# Patient Record
Sex: FEMALE | Race: WHITE
[De-identification: names, ages, dates, MRNs, and addresses within clinical notes are randomized per-mention and may not be internally consistent; named-entity substitution may affect disease eponyms.]

---

## 2017-02-23 ENCOUNTER — HOSPITAL ENCOUNTER (INPATIENT)
Dept: HOSPITAL 23 - P1E | Age: 52
LOS: 13 days | Discharge: HOME | DRG: 897 | End: 2017-03-08
Admitting: SPECIALIST
Payer: COMMERCIAL

## 2017-02-23 DIAGNOSIS — K92.1: ICD-10-CM

## 2017-02-23 DIAGNOSIS — K21.9: ICD-10-CM

## 2017-02-23 DIAGNOSIS — F10.239: Primary | ICD-10-CM

## 2017-02-23 DIAGNOSIS — F17.200: ICD-10-CM

## 2017-02-23 DIAGNOSIS — G40.909: ICD-10-CM

## 2017-02-23 DIAGNOSIS — F41.9: ICD-10-CM

## 2017-02-23 DIAGNOSIS — F31.32: ICD-10-CM

## 2017-02-23 DIAGNOSIS — E66.9: ICD-10-CM

## 2017-02-23 PROCEDURE — HZ2ZZZZ DETOXIFICATION SERVICES FOR SUBSTANCE ABUSE TREATMENT: ICD-10-PCS | Performed by: SPECIALIST

## 2017-02-24 LAB
ANISOCYTOSIS: (no result)
BARBITURATES UR QL SCN: 0.3 MG/DL (ref 0.2–2)
BARBITURATES UR QL SCN: 3.5 G/DL (ref 3.5–5)
BARBITURATES: (no result)
BASOPHIL#: 0 X10E3 (ref 0–0.3)
BASOPHIL%: 0.6 % (ref 0–2.5)
BASOPHIL: 1 % (ref 0–3)
BENZODIAZ UR QL SCN: 12 U/L (ref 10–40)
BENZODIAZ UR QL SCN: 12 U/L (ref 10–42)
BENZODIAZEPINES: (no result)
BLOOD UREA NITROGEN: 15 MG/DL (ref 9–23)
BUN/CREATININE RATIO: 18.75
BZE UR QL SCN: 44 U/L (ref 32–92)
CALCIUM SERUM: 8.9 MG/DL (ref 8.4–10.2)
CK MB SERPL-RTO: 15.5 % (ref 11–15.5)
CK MB SERPL-RTO: 33.5 G/DL (ref 30–36)
COCAINE: (no result)
CREATININE SERUM: 0.8 MG/DL (ref 0.6–1.4)
DIFF IND: YES
DX ICD CODE: (no result)
DX ICD CODE: (no result)
EOSINOPHIL#: 0.2 X10E3 (ref 0–0.7)
EOSINOPHIL%: 3.7 % (ref 0–7)
EOSINOPHIL: 1 % (ref 0–7)
FREE THYROXIN (T4): 0.68 NG/DL (ref 0.58–1.64)
GLOM FILT RATE ESTIMATED: (no result) ML/MIN (ref 60–?)
GLUCOSE FASTING: 82 MG/DL (ref 70–110)
HEMATOCRIT: 40.7 % (ref 35–45)
HEMOGLOBIN: 13.6 GM/DL (ref 12–16)
KETONES UR QL: 112 MMOL/L (ref 100–111)
KETONES UR QL: 24 MMOL/L (ref 22–31)
LYMPHOCYTE#: 2.4 X10E3 (ref 1–3.5)
LYMPHOCYTE%: 51.8 % (ref 17–45)
LYMPHOCYTE: 51 % (ref 17–50)
MACROCYTOSIS: (no result)
MEAN CELL VOLUME: 99 FL (ref 83–96)
MEAN CORPUSCULAR HEMOGLOBIN: 33.1 PG (ref 28–34)
MEAN PLATELET VOLUME: 9.2 FL (ref 6.5–11.5)
MONOCYTE#: 0.5 X10E3 (ref 0–1)
MONOCYTE%: 11 % (ref 3–12)
MONOCYTE: 5 % (ref 3–12)
NEUTROPHIL#: 1.5 X10E3 (ref 1.5–7.1)
NEUTROPHIL%: 32.9 % (ref 40–75)
NEUTROPHIL: 42 % (ref 40–75)
OPIATES: (no result)
PLATELET COUNT: 169 X10E3 (ref 140–420)
PLATELET ESTIMATE: NORMAL
POTASSIUM: 4.2 MMOL/L (ref 3.5–5.1)
PROTEIN TOTAL SERUM: 6 G/DL (ref 6–8.3)
RED BLOOD COUNT: 4.11 X10E (ref 3.9–5.3)
SODIUM: 143 MMOL/L (ref 135–145)
THYROID STIMULATING HORMONE: 0.81 UIU/ML (ref 0.34–5.6)
TRICYCLIC ANTIDEPRESSANTS: (no result)
U METHADONE: (no result)
URINE APPEARANCE: (no result)
URINE BILIRUBIN: (no result)
URINE BLOOD: (no result)
URINE COLOR: (no result)
URINE GLUCOSE: (no result) MG/DL
URINE KETONE: (no result)
URINE LEUKOCYTE ESTERASE: (no result)
URINE NITRATE: (no result)
URINE PH: 7 (ref 5–8)
URINE PROTEIN: (no result)
URINE SOURCE: (no result)
URINE SPECIFIC GRAVITY: 1.03 (ref 1–1.03)
URINE UROBILINOGEN: 1 MG/DL
WHITE BLOOD COUNT: 4.7 X10E3 (ref 4–10.5)

## 2017-03-04 LAB
HEMATOCRIT: 41.7 % (ref 35–45)
HEMOGLOBIN: 13.8 GM/DL (ref 12–16)

## 2017-03-08 ENCOUNTER — HOSPITAL ENCOUNTER (INPATIENT)
Dept: HOSPITAL 23 - P2S | Age: 52
LOS: 1 days | Discharge: HOME | DRG: 885 | End: 2017-03-09
Admitting: SPECIALIST
Payer: COMMERCIAL
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
## 2017-03-08 DIAGNOSIS — F31.9: Primary | ICD-10-CM

## 2017-03-08 DIAGNOSIS — G40.909: ICD-10-CM

## 2017-03-08 DIAGNOSIS — F10.20: ICD-10-CM

## 2017-06-09 ENCOUNTER — HOSPITAL ENCOUNTER (INPATIENT)
Dept: HOSPITAL 23 - P2L | Age: 52
LOS: 5 days | Discharge: HOME | DRG: 885 | End: 2017-06-14
Attending: SPECIALIST | Admitting: SPECIALIST
Payer: COMMERCIAL

## 2017-06-09 DIAGNOSIS — F17.210: ICD-10-CM

## 2017-06-09 DIAGNOSIS — R45.851: ICD-10-CM

## 2017-06-09 DIAGNOSIS — M19.90: ICD-10-CM

## 2017-06-09 DIAGNOSIS — E66.9: ICD-10-CM

## 2017-06-09 DIAGNOSIS — G40.909: ICD-10-CM

## 2017-06-09 DIAGNOSIS — Z91.040: ICD-10-CM

## 2017-06-09 DIAGNOSIS — F31.32: Primary | ICD-10-CM

## 2017-06-09 DIAGNOSIS — Z88.5: ICD-10-CM

## 2017-06-09 DIAGNOSIS — K21.9: ICD-10-CM

## 2017-06-10 LAB
BARBITURATES UR QL SCN: 0.8 MG/DL (ref 0.2–2)
BARBITURATES UR QL SCN: 4.3 G/DL (ref 3.5–5)
BASOPHIL#: 0.1 X10E3 (ref 0–0.3)
BASOPHIL%: 0.8 % (ref 0–2.5)
BENZODIAZ UR QL SCN: 10 U/L (ref 10–40)
BENZODIAZ UR QL SCN: 13 U/L (ref 10–42)
BLOOD UREA NITROGEN: 16 MG/DL (ref 9–23)
BUN/CREATININE RATIO: 17.77
BZE UR QL SCN: 48 U/L (ref 32–92)
CALCIUM SERUM: 9.6 MG/DL (ref 8.4–10.2)
CK MB SERPL-RTO: 14.3 % (ref 11–15.5)
CK MB SERPL-RTO: 32.9 G/DL (ref 30–36)
CREATININE SERUM: 0.9 MG/DL (ref 0.6–1.4)
DEPAKENE (VALPROIC ACID): <10 UG/ML (ref 50–125)
DIFF IND: NO
EOSINOPHIL#: 0.4 X10E3 (ref 0–0.7)
EOSINOPHIL%: 4.8 % (ref 0–7)
GLOM FILT RATE ESTIMATED: 74.1 ML/MIN (ref 60–?)
GLUCOSE FASTING: 93 MG/DL (ref 70–110)
HEMATOCRIT: 44.4 % (ref 35–45)
HEMOGLOBIN: 14.6 GM/DL (ref 12–16)
KETONES UR QL: 110 MMOL/L (ref 100–111)
KETONES UR QL: 27 MMOL/L (ref 22–31)
LYMPHOCYTE#: 3.4 X10E3 (ref 1–3.5)
LYMPHOCYTE%: 45.2 % (ref 17–45)
MEAN CELL VOLUME: 101.4 FL (ref 83–96)
MEAN CORPUSCULAR HEMOGLOBIN: 33.4 PG (ref 28–34)
MEAN PLATELET VOLUME: 9.9 FL (ref 6.5–11.5)
MONOCYTE#: 0.6 X10E3 (ref 0–1)
MONOCYTE%: 8.3 % (ref 3–12)
NEUTROPHIL#: 3 X10E3 (ref 1.5–7.1)
NEUTROPHIL%: 40.9 % (ref 40–75)
PLATELET COUNT: 197 X10E3 (ref 140–420)
POTASSIUM: 3.8 MMOL/L (ref 3.5–5.1)
PROTEIN TOTAL SERUM: 7.1 G/DL (ref 6–8.3)
RED BLOOD COUNT: 4.37 X10E (ref 3.9–5.3)
SODIUM: 142 MMOL/L (ref 135–145)
WHITE BLOOD COUNT: 7.5 X10E3 (ref 4–10.5)

## 2017-06-12 LAB
BARBITURATES: (no result)
BENZODIAZEPINES: (no result)
COCAINE: (no result)
DX ICD CODE: (no result)
DX ICD CODE: (no result)
OPIATES: (no result)
TRICYCLIC ANTIDEPRESSANTS: (no result)
U HYALINE CASTS AUWI: (no result) /[LPF]
U METHADONE: (no result)
URBCS1 AUWI: (no result) /[HPF] (ref 0–2)
URINE APPEARANCE: CLEAR
URINE BACTERIA AUWI: (no result)
URINE BILIRUBIN: (no result)
URINE BLOOD: (no result)
URINE COLOR: YELLOW
URINE GLUCOSE: (no result) MG/DL
URINE KETONE: (no result)
URINE LEUKOCYTE ESTERASE: (no result)
URINE NITRATE: (no result)
URINE PH: 6.5 (ref 5–8)
URINE PROTEIN: (no result)
URINE SOURCE: (no result)
URINE SPECIFIC GRAVITY: 1.02 (ref 1–1.03)
URINE SQUAMOUS EPITHELIAL CELL: (no result) /[HPF]
URINE UROBILINOGEN: 0.2 MG/DL
UWBCS1 AUWI: (no result) (ref 0–5)

## 2017-07-05 ENCOUNTER — HOSPITAL ENCOUNTER (INPATIENT)
Dept: HOSPITAL 23 - P1E | Age: 52
LOS: 5 days | DRG: 885 | End: 2017-07-10
Attending: SPECIALIST | Admitting: SPECIALIST
Payer: COMMERCIAL

## 2017-07-05 ENCOUNTER — HOSPITAL ENCOUNTER (EMERGENCY)
Dept: HOSPITAL 23 - CED | Age: 52
End: 2017-07-05
Payer: COMMERCIAL

## 2017-07-05 DIAGNOSIS — Z91.040: ICD-10-CM

## 2017-07-05 DIAGNOSIS — F11.23: ICD-10-CM

## 2017-07-05 DIAGNOSIS — R45.851: Primary | ICD-10-CM

## 2017-07-05 DIAGNOSIS — Z88.5: ICD-10-CM

## 2017-07-05 DIAGNOSIS — E66.9: ICD-10-CM

## 2017-07-05 DIAGNOSIS — F31.9: ICD-10-CM

## 2017-07-05 DIAGNOSIS — E11.9: ICD-10-CM

## 2017-07-05 DIAGNOSIS — Z79.899: ICD-10-CM

## 2017-07-05 DIAGNOSIS — Z98.51: ICD-10-CM

## 2017-07-05 DIAGNOSIS — G40.909: ICD-10-CM

## 2017-07-05 DIAGNOSIS — K21.9: ICD-10-CM

## 2017-07-05 DIAGNOSIS — F17.210: ICD-10-CM

## 2017-07-05 DIAGNOSIS — F33.1: Primary | ICD-10-CM

## 2017-07-05 LAB
ACETAMINOPHEN: <10 UG/ML
ALCOHOL BLOOD: 18 MG/DL
BARBITURATES UR QL SCN: 0.5 MG/DL
BARBITURATES UR QL SCN: 4.3 G/DL
BARBITURATES: (no result)
BASOPHIL#: 0.1 X10E3
BASOPHIL%: 1 %
BENZODIAZ UR QL SCN: 12 U/L
BENZODIAZ UR QL SCN: 15 U/L
BENZODIAZEPINES: (no result)
BLOOD UREA NITROGEN: 10 MG/DL
BUN/CREATININE RATIO: 12.5
BZE UR QL SCN: 52 U/L
CALCIUM SERUM: 9.1 MG/DL
CK MB SERPL-RTO: 13.6 %
CK MB SERPL-RTO: 34.1 G/DL
COCAINE: (no result)
CREATININE SERUM: 0.8 MG/DL
DIFF IND: NO
DX ICD CODE: (no result)
DX ICD CODE: (no result)
EOSINOPHIL#: 0.2 X10E3
EOSINOPHIL%: 2.3 %
ETHANOL BLD GC-MCNC: <4 MG/DL
GLOM FILT RATE ESTIMATED: 85.4 ML/MIN
GLUCOSE FASTING: 81 MG/DL
HEMATOCRIT: 42.1 %
HEMOGLOBIN: 14.4 GM/DL
KETONES UR QL: 109 MMOL/L
KETONES UR QL: 22 MMOL/L
LYMPHOCYTE#: 2.8 X10E3
LYMPHOCYTE%: 44.1 %
MEAN CELL VOLUME: 98.4 FL
MEAN CORPUSCULAR HEMOGLOBIN: 33.6 PG
MEAN PLATELET VOLUME: 8.8 FL
METHADONE UR QL SCN: 1.2 NG/ML
MONOCYTE#: 0.4 X10E3
MONOCYTE%: 6.9 %
NEUTROPHIL#: 2.9 X10E3
NEUTROPHIL%: 45.7 %
OPIATES: (no result)
PLATELET COUNT: 228 X10E3
POC - TROPONIN: <0.05 NG/ML
POTASSIUM: 3.6 MMOL/L
PROTEIN TOTAL SERUM: 7.4 G/DL
RED BLOOD COUNT: 4.28 X10E
SODIUM: 141 MMOL/L
TRICYCLIC ANTIDEPRESSANTS: (no result)
U METHADONE: (no result)
WHITE BLOOD COUNT: 6.4 X10E3

## 2017-07-05 PROCEDURE — HZ2ZZZZ DETOXIFICATION SERVICES FOR SUBSTANCE ABUSE TREATMENT: ICD-10-PCS | Performed by: SPECIALIST

## 2017-07-05 PROCEDURE — G0480 DRUG TEST DEF 1-7 CLASSES: HCPCS

## 2017-07-06 LAB
BARBITURATES UR QL SCN: 0.4 MG/DL (ref 0.2–2)
BARBITURATES UR QL SCN: 3.6 G/DL (ref 3.5–5)
BASOPHIL#: 0 X10E3 (ref 0–0.3)
BASOPHIL%: 0.6 % (ref 0–2.5)
BENZODIAZ UR QL SCN: 13 U/L (ref 10–40)
BENZODIAZ UR QL SCN: 13 U/L (ref 10–42)
BLOOD UREA NITROGEN: 16 MG/DL (ref 9–23)
BUN/CREATININE RATIO: 22.85
BZE UR QL SCN: 45 U/L (ref 32–92)
CALCIUM SERUM: 9.3 MG/DL (ref 8.4–10.2)
CK MB SERPL-RTO: 13.9 % (ref 11–15.5)
CK MB SERPL-RTO: 33.3 G/DL (ref 30–36)
CREATININE SERUM: 0.7 MG/DL (ref 0.6–1.4)
DIFF IND: NO
EOSINOPHIL#: 0.2 X10E3 (ref 0–0.7)
EOSINOPHIL%: 3.4 % (ref 0–7)
GLOM FILT RATE ESTIMATED: 100.3 ML/MIN (ref 60–?)
GLUCOSE FASTING: 90 MG/DL (ref 70–110)
HEMATOCRIT: 41 % (ref 35–45)
HEMOGLOBIN: 13.6 GM/DL (ref 12–16)
KETONES UR QL: 109 MMOL/L (ref 100–111)
KETONES UR QL: 25 MMOL/L (ref 22–31)
LYMPHOCYTE#: 2.8 X10E3 (ref 1–3.5)
LYMPHOCYTE%: 44.7 % (ref 17–45)
MEAN CELL VOLUME: 99.8 FL (ref 83–96)
MEAN CORPUSCULAR HEMOGLOBIN: 33.3 PG (ref 28–34)
MEAN PLATELET VOLUME: 9.6 FL (ref 6.5–11.5)
MONOCYTE#: 0.6 X10E3 (ref 0–1)
MONOCYTE%: 9.4 % (ref 3–12)
NEUTROPHIL#: 2.7 X10E3 (ref 1.5–7.1)
NEUTROPHIL%: 41.9 % (ref 40–75)
PLATELET COUNT: 184 X10E3 (ref 140–420)
POTASSIUM: 3.8 MMOL/L (ref 3.5–5.1)
PROTEIN TOTAL SERUM: 6 G/DL (ref 6–8.3)
RED BLOOD COUNT: 4.1 X10E (ref 3.9–5.3)
SODIUM: 140 MMOL/L (ref 135–145)
WHITE BLOOD COUNT: 6.4 X10E3 (ref 4–10.5)

## 2017-07-12 ENCOUNTER — HOSPITAL ENCOUNTER (EMERGENCY)
Dept: HOSPITAL 23 - CED | Age: 52
Discharge: HOME | End: 2017-07-12
Payer: COMMERCIAL

## 2017-07-12 DIAGNOSIS — Z88.8: ICD-10-CM

## 2017-07-12 DIAGNOSIS — F17.200: ICD-10-CM

## 2017-07-12 DIAGNOSIS — Z98.890: ICD-10-CM

## 2017-07-12 DIAGNOSIS — Z91.040: ICD-10-CM

## 2017-07-12 DIAGNOSIS — F10.14: Primary | ICD-10-CM

## 2017-07-12 LAB
BARBITURATES UR QL SCN: 0.6 MG/DL (ref 0.2–2)
BARBITURATES UR QL SCN: 4.4 G/DL (ref 3.5–5)
BASOPHIL#: 0 X10E3 (ref 0–0.3)
BASOPHIL%: 0.2 % (ref 0–2.5)
BENZODIAZ UR QL SCN: 23 U/L (ref 10–42)
BENZODIAZ UR QL SCN: 28 U/L (ref 10–40)
BLOOD UREA NITROGEN: 12 MG/DL (ref 9–23)
BUN/CREATININE RATIO: 15
BZE UR QL SCN: 68 U/L (ref 32–92)
CALCIUM SERUM: 9.2 MG/DL (ref 8.4–10.2)
CK MB SERPL-RTO: 13.4 % (ref 11–15.5)
CK MB SERPL-RTO: 33.3 G/DL (ref 30–36)
CREATININE SERUM: 0.8 MG/DL (ref 0.6–1.4)
DIFF IND: NO
EOSINOPHIL#: 0.1 X10E3 (ref 0–0.7)
EOSINOPHIL%: 1.1 % (ref 0–7)
GENTAMICIN PEAK SERPL-MCNC: NO MG/L
GLOM FILT RATE ESTIMATED: 85.4 ML/MIN (ref 60–?)
GLUCOSE FASTING: 119 MG/DL (ref 70–110)
HEMATOCRIT: 44.9 % (ref 35–45)
HEMOGLOBIN: 14.9 GM/DL (ref 12–16)
KETONES UR QL: 107 MMOL/L (ref 100–111)
KETONES UR QL: 23 MMOL/L (ref 22–31)
LIPASE: 15 U/L (ref 22–51)
LYMPHOCYTE#: 1.6 X10E3 (ref 1–3.5)
LYMPHOCYTE%: 14.6 % (ref 17–45)
MEAN CELL VOLUME: 99.5 FL (ref 83–96)
MEAN CORPUSCULAR HEMOGLOBIN: 33.1 PG (ref 28–34)
MEAN PLATELET VOLUME: 9.2 FL (ref 6.5–11.5)
MONOCYTE#: 0.5 X10E3 (ref 0–1)
MONOCYTE%: 4.4 % (ref 3–12)
NEUTROPHIL#: 8.9 X10E3 (ref 1.5–7.1)
NEUTROPHIL%: 79.7 % (ref 40–75)
PLATELET COUNT: 225 X10E3 (ref 140–420)
POTASSIUM: 3.4 MMOL/L (ref 3.5–5.1)
PROTEIN TOTAL SERUM: 7.6 G/DL (ref 6–8.3)
RED BLOOD COUNT: 4.51 X10E (ref 3.9–5.3)
SODIUM: 138 MMOL/L (ref 135–145)
U HYALINE CASTS AUWI: (no result) /[LPF]
URBCS1 AUWI: (no result) /[HPF] (ref 0–2)
URINE APPEARANCE: (no result)
URINE BACTERIA AUWI: (no result)
URINE BILIRUBIN: (no result)
URINE BLOOD: (no result)
URINE COLOR: YELLOW
URINE GLUCOSE: (no result) MG/DL
URINE KETONE: (no result)
URINE LEUKOCYTE ESTERASE: (no result)
URINE NITRATE: (no result)
URINE PH: 7 (ref 5–8)
URINE PROTEIN: (no result)
URINE SOURCE: (no result)
URINE SPECIFIC GRAVITY: 1.01 (ref 1–1.03)
URINE SQUAMOUS EPITHELIAL CELL: (no result) /[HPF]
URINE UROBILINOGEN: 0.2 MG/DL
UWBCS1 AUWI: (no result) (ref 0–5)
WHITE BLOOD COUNT: 11.2 X10E3 (ref 4–10.5)

## 2018-05-23 ENCOUNTER — APPOINTMENT (OUTPATIENT)
Dept: CT IMAGING | Facility: HOSPITAL | Age: 53
End: 2018-05-23

## 2018-05-23 ENCOUNTER — HOSPITAL ENCOUNTER (EMERGENCY)
Facility: HOSPITAL | Age: 53
Discharge: HOME | End: 2018-05-23
Attending: EMERGENCY MEDICINE | Admitting: EMERGENCY MEDICINE

## 2018-05-23 VITALS
RESPIRATION RATE: 16 BRPM | OXYGEN SATURATION: 99 % | HEART RATE: 74 BPM | SYSTOLIC BLOOD PRESSURE: 117 MMHG | DIASTOLIC BLOOD PRESSURE: 85 MMHG | TEMPERATURE: 97.9 F | WEIGHT: 190 LBS | HEIGHT: 68 IN | BODY MASS INDEX: 28.79 KG/M2

## 2018-05-23 DIAGNOSIS — G40.909 SEIZURE DISORDER (HCC): Primary | ICD-10-CM

## 2018-05-23 LAB
ALBUMIN SERPL-MCNC: 4.1 G/DL (ref 3.5–5.2)
ALBUMIN/GLOB SERPL: 1.5 G/DL
ALP SERPL-CCNC: 63 U/L (ref 39–117)
ALT SERPL W P-5'-P-CCNC: 16 U/L (ref 1–33)
ANION GAP SERPL CALCULATED.3IONS-SCNC: 12.4 MMOL/L
AST SERPL-CCNC: 14 U/L (ref 1–32)
BASOPHILS # BLD AUTO: 0.03 10*3/MM3 (ref 0–0.2)
BASOPHILS NFR BLD AUTO: 0.5 % (ref 0–1.5)
BILIRUB SERPL-MCNC: 0.3 MG/DL (ref 0.1–1.2)
BUN BLD-MCNC: 14 MG/DL (ref 6–20)
BUN/CREAT SERPL: 15.2 (ref 7–25)
CALCIUM SPEC-SCNC: 9.1 MG/DL (ref 8.6–10.5)
CHLORIDE SERPL-SCNC: 106 MMOL/L (ref 98–107)
CO2 SERPL-SCNC: 23.6 MMOL/L (ref 22–29)
CREAT BLD-MCNC: 0.92 MG/DL (ref 0.57–1)
DEPRECATED RDW RBC AUTO: 46.5 FL (ref 37–54)
EOSINOPHIL # BLD AUTO: 0.14 10*3/MM3 (ref 0–0.7)
EOSINOPHIL NFR BLD AUTO: 2.2 % (ref 0.3–6.2)
ERYTHROCYTE [DISTWIDTH] IN BLOOD BY AUTOMATED COUNT: 13 % (ref 11.7–13)
GFR SERPL CREATININE-BSD FRML MDRD: 64 ML/MIN/1.73
GLOBULIN UR ELPH-MCNC: 2.7 GM/DL
GLUCOSE BLD-MCNC: 99 MG/DL (ref 65–99)
HCT VFR BLD AUTO: 40.3 % (ref 35.6–45.5)
HGB BLD-MCNC: 13.8 G/DL (ref 11.9–15.5)
HOLD SPECIMEN: NORMAL
HOLD SPECIMEN: NORMAL
IMM GRANULOCYTES # BLD: 0 10*3/MM3 (ref 0–0.03)
IMM GRANULOCYTES NFR BLD: 0 % (ref 0–0.5)
LYMPHOCYTES # BLD AUTO: 1.67 10*3/MM3 (ref 0.9–4.8)
LYMPHOCYTES NFR BLD AUTO: 25.7 % (ref 19.6–45.3)
MCH RBC QN AUTO: 33.5 PG (ref 26.9–32)
MCHC RBC AUTO-ENTMCNC: 34.2 G/DL (ref 32.4–36.3)
MCV RBC AUTO: 97.8 FL (ref 80.5–98.2)
MONOCYTES # BLD AUTO: 0.59 10*3/MM3 (ref 0.2–1.2)
MONOCYTES NFR BLD AUTO: 9.1 % (ref 5–12)
NEUTROPHILS # BLD AUTO: 4.07 10*3/MM3 (ref 1.9–8.1)
NEUTROPHILS NFR BLD AUTO: 62.5 % (ref 42.7–76)
PLATELET # BLD AUTO: 222 10*3/MM3 (ref 140–500)
PMV BLD AUTO: 11.3 FL (ref 6–12)
POTASSIUM BLD-SCNC: 4.8 MMOL/L (ref 3.5–5.2)
PROT SERPL-MCNC: 6.8 G/DL (ref 6–8.5)
RBC # BLD AUTO: 4.12 10*6/MM3 (ref 3.9–5.2)
SODIUM BLD-SCNC: 142 MMOL/L (ref 136–145)
WBC NRBC COR # BLD: 6.5 10*3/MM3 (ref 4.5–10.7)
WHOLE BLOOD HOLD SPECIMEN: NORMAL
WHOLE BLOOD HOLD SPECIMEN: NORMAL

## 2018-05-23 PROCEDURE — 99284 EMERGENCY DEPT VISIT MOD MDM: CPT

## 2018-05-23 PROCEDURE — 96374 THER/PROPH/DIAG INJ IV PUSH: CPT

## 2018-05-23 PROCEDURE — 85025 COMPLETE CBC W/AUTO DIFF WBC: CPT | Performed by: EMERGENCY MEDICINE

## 2018-05-23 PROCEDURE — 80053 COMPREHEN METABOLIC PANEL: CPT | Performed by: EMERGENCY MEDICINE

## 2018-05-23 PROCEDURE — 25010000003 LEVETIRACETAM IN NACL 0.75% 1000 MG/100ML SOLUTION: Performed by: EMERGENCY MEDICINE

## 2018-05-23 PROCEDURE — 70450 CT HEAD/BRAIN W/O DYE: CPT

## 2018-05-23 RX ORDER — LEVETIRACETAM 10 MG/ML
1000 INJECTION INTRAVASCULAR ONCE
Status: COMPLETED | OUTPATIENT
Start: 2018-05-23 | End: 2018-05-23

## 2018-05-23 RX ORDER — SODIUM CHLORIDE 0.9 % (FLUSH) 0.9 %
10 SYRINGE (ML) INJECTION AS NEEDED
Status: DISCONTINUED | OUTPATIENT
Start: 2018-05-23 | End: 2018-05-23 | Stop reason: HOSPADM

## 2018-05-23 RX ORDER — ACETAMINOPHEN 500 MG
1000 TABLET ORAL ONCE
Status: COMPLETED | OUTPATIENT
Start: 2018-05-23 | End: 2018-05-23

## 2018-05-23 RX ORDER — LEVETIRACETAM 500 MG/1
500 TABLET ORAL 2 TIMES DAILY
Qty: 60 TABLET | Refills: 3 | Status: SHIPPED | OUTPATIENT
Start: 2018-05-23

## 2018-05-23 RX ADMIN — LEVETIRACETAM 1000 MG: 10 INJECTION INTRAVENOUS at 10:43

## 2018-05-23 RX ADMIN — ACETAMINOPHEN 1000 MG: 500 TABLET ORAL at 11:44

## 2018-05-23 NOTE — ED NOTES
Per EMS patient was at the [facility] home that she lives at when she was found having a seizure; per ems patient was postictal after the seizure but is now awake and alert      Kassy Hassan RN  05/23/18 1001       Kassy Hassan RN  05/23/18 1009

## 2018-05-23 NOTE — ED NOTES
Patient reports that she takes topamax 400mg but has not seen her MD in a long time      Kassy Hassan, RN  05/23/18 8503

## 2018-05-23 NOTE — ED NOTES
"Patient called out to toilet. While aiding the patient she complained of a headache and \"feeling weird\". Notified Sebastian YANEZ.      Sree Dumont  05/23/18 1118    "

## 2018-05-23 NOTE — ED PROVIDER NOTES
EMERGENCY DEPARTMENT ENCOUNTER    CHIEF COMPLAINT  Chief Complaint: Seizure  History given by: Pt  History limited by: Nothing  Room Number: 04/04  PMD: No Known Provider      HPI:  Pt is a 53 y.o. female with a hx of epilepsy and seizures since 2014 who presents from the [facility] facility complaining of seizure-like activity this morning. Pt reports with her usual seizures she has slurred speech, L hand tremors and L facial tingling, and states that her seizure today had the same progression of sx. She denies incontinence of bowel or bladder. EMS reports that at the scene, pt had post-ictal confusion. She also c/o fatigue as well as sleep deprivation over the last week. Pt reports she takes topamax for seizures, but denies any other anti-seizure medication. Pt reports she was started on Prozac yesterday. Pt reports she has a hx of EtOH abuse, and has been sober since 01/2018.     Duration:  brief  Onset: gradual  Timing: constant  Quality: seizure  Intensity/Severity: moderate  Progression: resolved  Associated Symptoms:  slurred speech, L hand tremors and L facial tingling prior to the seizure as well as post-ictal confusion (resolved), fatigue and sleep deprivation  Aggravating Factors: none  Alleviating Factors: none  Previous Episodes: Pt reports a hx of epilepsy and seizures  Treatment before arrival: Pt reports she takes topamax for her seizures    PAST MEDICAL HISTORY  Active Ambulatory Problems     Diagnosis Date Noted   • No Active Ambulatory Problems     Resolved Ambulatory Problems     Diagnosis Date Noted   • No Resolved Ambulatory Problems     Past Medical History:   Diagnosis Date   • Epilepsy    • H/O ETOH abuse    • Seizures        PAST SURGICAL HISTORY  Past Surgical History:   Procedure Laterality Date   • BACK SURGERY         FAMILY HISTORY  History reviewed. No pertinent family history.    SOCIAL HISTORY  Social History     Social History   • Marital status:      Spouse name: N/A   •  Number of children: N/A   • Years of education: N/A     Occupational History   • Not on file.     Social History Main Topics   • Smoking status: Current Every Day Smoker     Types: Cigarettes   • Smokeless tobacco: Never Used   • Alcohol use No      Comment: sober since 1/2018   • Drug use: No   • Sexual activity: Defer     Other Topics Concern   • Not on file     Social History Narrative   • No narrative on file       ALLERGIES  Codeine and Latex    REVIEW OF SYSTEMS  Review of Systems   Constitutional: Positive for fatigue. Negative for fever.   HENT: Negative for sore throat.    Eyes: Negative.    Respiratory: Negative for cough and shortness of breath.    Cardiovascular: Negative for chest pain.   Gastrointestinal: Negative for abdominal pain, diarrhea and vomiting.   Genitourinary: Negative for dysuria.   Musculoskeletal: Negative for neck pain.   Skin: Negative for rash.   Allergic/Immunologic: Negative.    Neurological: Positive for tremors (L hand) and speech difficulty (slurred). Negative for weakness, numbness and headaches.        Pt c/o tingling to L face   Hematological: Negative.    Psychiatric/Behavioral: Positive for confusion (per EMS, resolved) and sleep disturbance.   All other systems reviewed and are negative.      PHYSICAL EXAM  ED Triage Vitals [05/23/18 1005]   Temp Heart Rate Resp BP SpO2   -- 82 18 113/80 98 %      Temp src Heart Rate Source Patient Position BP Location FiO2 (%)   -- Monitor Sitting Left arm --       Physical Exam   Constitutional: She is oriented to person, place, and time and well-developed, well-nourished, and in no distress. No distress.   No signs of incontinence   HENT:   Head: Normocephalic and atraumatic.   No tongue trauma   Eyes: EOM are normal. Pupils are equal, round, and reactive to light.   Neck: Normal range of motion. Neck supple.   No meningismus   Cardiovascular: Normal rate, regular rhythm and normal heart sounds.    Pulmonary/Chest: Effort normal and  breath sounds normal. No respiratory distress.   Abdominal: Soft. There is no tenderness. There is no rebound and no guarding.   Musculoskeletal: Normal range of motion. She exhibits no edema.        Cervical back: She exhibits no tenderness.   Neurological: She is alert and oriented to person, place, and time. She has normal sensation and normal strength.   Normal neuro exam   Skin: Skin is warm and dry. No rash noted.   Psychiatric: Mood and affect normal.   Nursing note and vitals reviewed.      LAB RESULTS  Lab Results (last 24 hours)     Procedure Component Value Units Date/Time    CBC & Differential [[id number]] Collected:  05/23/18 1012    Specimen:  Blood Updated:  05/23/18 1028    Narrative:       The following orders were created for panel order CBC & Differential.  Procedure                               Abnormality         Status                     ---------                               -----------         ------                     CBC Auto Differential[[id number]]        Abnormal            Final result                 Please view results for these tests on the individual orders.    Comprehensive Metabolic Panel [[id number]] Collected:  05/23/18 1012    Specimen:  Blood Updated:  05/23/18 1107     Glucose 99 mg/dL      BUN 14 mg/dL      Creatinine 0.92 mg/dL      Sodium 142 mmol/L      Potassium 4.8 mmol/L      Chloride 106 mmol/L      CO2 23.6 mmol/L      Calcium 9.1 mg/dL      Total Protein 6.8 g/dL      Albumin 4.10 g/dL      ALT (SGPT) 16 U/L      AST (SGOT) 14 U/L      Alkaline Phosphatase 63 U/L      Total Bilirubin 0.3 mg/dL      eGFR Non African Amer 64 mL/min/1.73      Globulin 2.7 gm/dL      A/G Ratio 1.5 g/dL      BUN/Creatinine Ratio 15.2     Anion Gap 12.4 mmol/L     CBC Auto Differential [[id number]]  (Abnormal) Collected:  05/23/18 1012    Specimen:  Blood Updated:  05/23/18 1028     WBC 6.50 10*3/mm3      RBC 4.12 10*6/mm3      Hemoglobin 13.8 g/dL      Hematocrit 40.3 %      MCV 97.8 fL       MCH 33.5 (H) pg      MCHC 34.2 g/dL      RDW 13.0 %      RDW-SD 46.5 fl      MPV 11.3 fL      Platelets 222 10*3/mm3      Neutrophil % 62.5 %      Lymphocyte % 25.7 %      Monocyte % 9.1 %      Eosinophil % 2.2 %      Basophil % 0.5 %      Immature Grans % 0.0 %      Neutrophils, Absolute 4.07 10*3/mm3      Lymphocytes, Absolute 1.67 10*3/mm3      Monocytes, Absolute 0.59 10*3/mm3      Eosinophils, Absolute 0.14 10*3/mm3      Basophils, Absolute 0.03 10*3/mm3      Immature Grans, Absolute 0.00 10*3/mm3           I ordered the above labs and reviewed the results    RADIOLOGY  CT Head Without Contrast   Final Result   There is a 1 cm nodular area of increased density in the   subcutaneous fat of the scalp over the posterior superior right parietal   bone that is nonspecific as described above and correlate with physical   exam. Otherwise, this is a normal head CT.        The results were communicated to Dr. Jordan in the emergency room by   telephone 05/23/2018 at 10:50 AM.       Radiation dose reduction techniques were utilized, including automated   exposure control and exposure modulation based on body size.       This report was finalized on 5/23/2018 1:21 PM by Dr. Kvng Gavin M.D.               I ordered the above noted radiological studies. Interpreted by radiologist. Discussed with radiologist (Dr. Gavin). Reviewed by me in PACS.     1057 - Spoke with Dr. Gavin who reports that the pt has a negative acute head CT    PROCEDURES  Procedures      PROGRESS AND CONSULTS        1015 - Lab work and head CT ordered for further evaluation. Keppra ordered.     1127 - Rechecked pt. Pt reports she felt unsteady after standing up to use the bedside commode. Informed pt of the result of her lab work and head CT which was negative acute. D/w pt the plan to discharge home with a prescription for keppra and follow up with neurology. Pt understands and agrees with plan. All questions answered.       MEDICAL DECISION  MAKING  Results were reviewed/discussed with the patient and they were also made aware of online access. Pt also made aware that some labs, such as cultures, will not be resulted during ER visit and follow up with PMD is necessary.     MDM  Number of Diagnoses or Management Options     Amount and/or Complexity of Data Reviewed  Clinical lab tests: ordered and reviewed (Glucose - 99  Hemoglobin - 13.8)  Tests in the radiology section of CPT®: ordered and reviewed (CT head - negative acute)  Discussion of test results with the performing providers: yes (Dr. Gavin)           DIAGNOSIS  Final diagnoses:   Seizure disorder       DISPOSITION  DISCHARGE    Patient discharged in stable condition.    Reviewed implications of results, diagnosis, meds, responsibility to follow up, warning signs and symptoms of possible worsening, potential complications and reasons to return to ER.    Patient/Family voiced understanding of above instructions.    Discussed plan for discharge, as there is no emergent indication for admission. Patient referred to primary care provider for BP management due to today's BP. Pt/family is agreeable and understands need for follow up and repeat testing.  Pt is aware that discharge does not mean that nothing is wrong but it indicates no emergency is present that requires admission and they must continue care with follow-up as given below or physician of their choice.     FOLLOW-UP  [facility] NEUROLOG[facility]  [address]  [place] [address] [phone]  In 1 week  For recheck         Medication List      New Prescriptions    levETIRAcetam 500 MG tablet  Commonly known as:  KEPPRA  Take 1 tablet by mouth 2 (Two) Times a Day.              Latest Documented Vital Signs:  As of 4:51 PM  BP- 117/85 HR- 74 Temp- 97.9 °F (36.6 °C) (Oral) O2 sat- 99%    --  Documentation assistance provided by senthil Rodriguez for Dr. Jordan.  Information recorded by the senthil was done  at my direction and has been verified and validated by me.          Amando Rodriguez  05/23/18 1154       Arvin Jordan MD  05/23/18 0241

## 2021-04-17 ENCOUNTER — APPOINTMENT (OUTPATIENT)
Dept: CT IMAGING | Facility: HOSPITAL | Age: 56
End: 2021-04-17

## 2021-04-17 ENCOUNTER — APPOINTMENT (OUTPATIENT)
Dept: GENERAL RADIOLOGY | Facility: HOSPITAL | Age: 56
End: 2021-04-17

## 2021-04-17 ENCOUNTER — HOSPITAL ENCOUNTER (EMERGENCY)
Facility: HOSPITAL | Age: 56
Discharge: HOME | End: 2021-04-17
Attending: EMERGENCY MEDICINE | Admitting: EMERGENCY MEDICINE

## 2021-04-17 VITALS
OXYGEN SATURATION: 97 % | SYSTOLIC BLOOD PRESSURE: 123 MMHG | HEART RATE: 94 BPM | DIASTOLIC BLOOD PRESSURE: 87 MMHG | BODY MASS INDEX: 28.89 KG/M2 | RESPIRATION RATE: 16 BRPM | HEIGHT: 68 IN | TEMPERATURE: 98.7 F

## 2021-04-17 DIAGNOSIS — F15.10 METHAMPHETAMINE ABUSE (HCC): ICD-10-CM

## 2021-04-17 DIAGNOSIS — F10.929 ALCOHOLIC INTOXICATION WITH COMPLICATION (HCC): Primary | ICD-10-CM

## 2021-04-17 LAB
ALBUMIN SERPL-MCNC: 4.2 G/DL (ref 3.5–5.2)
ALBUMIN/GLOB SERPL: 1.8 G/DL
ALP SERPL-CCNC: 63 U/L (ref 39–117)
ALT SERPL W P-5'-P-CCNC: 32 U/L (ref 1–33)
AMPHET+METHAMPHET UR QL: POSITIVE
ANION GAP SERPL CALCULATED.3IONS-SCNC: 12.9 MMOL/L (ref 5–15)
AST SERPL-CCNC: 51 U/L (ref 1–32)
BARBITURATES UR QL SCN: NEGATIVE
BASOPHILS # BLD AUTO: 0.07 10*3/MM3 (ref 0–0.2)
BASOPHILS NFR BLD AUTO: 0.8 % (ref 0–1.5)
BENZODIAZ UR QL SCN: NEGATIVE
BILIRUB SERPL-MCNC: 0.4 MG/DL (ref 0–1.2)
BILIRUB UR QL STRIP: NEGATIVE
BUN SERPL-MCNC: 9 MG/DL (ref 6–20)
BUN/CREAT SERPL: 12.7 (ref 7–25)
CALCIUM SPEC-SCNC: 8.3 MG/DL (ref 8.6–10.5)
CANNABINOIDS SERPL QL: NEGATIVE
CHLORIDE SERPL-SCNC: 101 MMOL/L (ref 98–107)
CK SERPL-CCNC: 884 U/L (ref 20–180)
CLARITY UR: CLEAR
CO2 SERPL-SCNC: 26.1 MMOL/L (ref 22–29)
COCAINE UR QL: NEGATIVE
COLOR UR: YELLOW
CREAT SERPL-MCNC: 0.71 MG/DL (ref 0.57–1)
DEPRECATED RDW RBC AUTO: 47.3 FL (ref 37–54)
EOSINOPHIL # BLD AUTO: 0.13 10*3/MM3 (ref 0–0.4)
EOSINOPHIL NFR BLD AUTO: 1.4 % (ref 0.3–6.2)
ERYTHROCYTE [DISTWIDTH] IN BLOOD BY AUTOMATED COUNT: 13.1 % (ref 12.3–15.4)
ETHANOL BLD-MCNC: 184 MG/DL (ref 0–10)
ETHANOL UR QL: 0.18 %
GFR SERPL CREATININE-BSD FRML MDRD: 85 ML/MIN/1.73
GLOBULIN UR ELPH-MCNC: 2.3 GM/DL
GLUCOSE SERPL-MCNC: 97 MG/DL (ref 65–99)
GLUCOSE UR STRIP-MCNC: NEGATIVE MG/DL
HCT VFR BLD AUTO: 40.1 % (ref 34–46.6)
HGB BLD-MCNC: 13.5 G/DL (ref 12–15.9)
HGB UR QL STRIP.AUTO: NEGATIVE
IMM GRANULOCYTES # BLD AUTO: 0.03 10*3/MM3 (ref 0–0.05)
IMM GRANULOCYTES NFR BLD AUTO: 0.3 % (ref 0–0.5)
KETONES UR QL STRIP: ABNORMAL
LEUKOCYTE ESTERASE UR QL STRIP.AUTO: NEGATIVE
LYMPHOCYTES # BLD AUTO: 1.79 10*3/MM3 (ref 0.7–3.1)
LYMPHOCYTES NFR BLD AUTO: 19.4 % (ref 19.6–45.3)
MCH RBC QN AUTO: 33.3 PG (ref 26.6–33)
MCHC RBC AUTO-ENTMCNC: 33.7 G/DL (ref 31.5–35.7)
MCV RBC AUTO: 98.8 FL (ref 79–97)
METHADONE UR QL SCN: NEGATIVE
MONOCYTES # BLD AUTO: 1.04 10*3/MM3 (ref 0.1–0.9)
MONOCYTES NFR BLD AUTO: 11.3 % (ref 5–12)
NEUTROPHILS NFR BLD AUTO: 6.16 10*3/MM3 (ref 1.7–7)
NEUTROPHILS NFR BLD AUTO: 66.8 % (ref 42.7–76)
NITRITE UR QL STRIP: NEGATIVE
NRBC BLD AUTO-RTO: 0 /100 WBC (ref 0–0.2)
OPIATES UR QL: NEGATIVE
OXYCODONE UR QL SCN: NEGATIVE
PH UR STRIP.AUTO: 6 [PH] (ref 5–8)
PLATELET # BLD AUTO: 244 10*3/MM3 (ref 140–450)
PMV BLD AUTO: 10 FL (ref 6–12)
POTASSIUM SERPL-SCNC: 3.2 MMOL/L (ref 3.5–5.2)
PROT SERPL-MCNC: 6.5 G/DL (ref 6–8.5)
PROT UR QL STRIP: NEGATIVE
QT INTERVAL: 376 MS
RBC # BLD AUTO: 4.06 10*6/MM3 (ref 3.77–5.28)
SODIUM SERPL-SCNC: 140 MMOL/L (ref 136–145)
SP GR UR STRIP: 1.01 (ref 1–1.03)
TROPONIN T SERPL-MCNC: <0.01 NG/ML (ref 0–0.03)
UROBILINOGEN UR QL STRIP: ABNORMAL
WBC # BLD AUTO: 9.22 10*3/MM3 (ref 3.4–10.8)

## 2021-04-17 PROCEDURE — 80307 DRUG TEST PRSMV CHEM ANLYZR: CPT | Performed by: PHYSICIAN ASSISTANT

## 2021-04-17 PROCEDURE — 99284 EMERGENCY DEPT VISIT MOD MDM: CPT

## 2021-04-17 PROCEDURE — 80053 COMPREHEN METABOLIC PANEL: CPT | Performed by: PHYSICIAN ASSISTANT

## 2021-04-17 PROCEDURE — 25010000002 DROPERIDOL PER 5 MG: Performed by: PHYSICIAN ASSISTANT

## 2021-04-17 PROCEDURE — 93005 ELECTROCARDIOGRAM TRACING: CPT | Performed by: EMERGENCY MEDICINE

## 2021-04-17 PROCEDURE — 82077 ASSAY SPEC XCP UR&BREATH IA: CPT | Performed by: PHYSICIAN ASSISTANT

## 2021-04-17 PROCEDURE — 82550 ASSAY OF CK (CPK): CPT | Performed by: PHYSICIAN ASSISTANT

## 2021-04-17 PROCEDURE — 81003 URINALYSIS AUTO W/O SCOPE: CPT | Performed by: PHYSICIAN ASSISTANT

## 2021-04-17 PROCEDURE — 71045 X-RAY EXAM CHEST 1 VIEW: CPT

## 2021-04-17 PROCEDURE — 85025 COMPLETE CBC W/AUTO DIFF WBC: CPT | Performed by: PHYSICIAN ASSISTANT

## 2021-04-17 PROCEDURE — 96372 THER/PROPH/DIAG INJ SC/IM: CPT

## 2021-04-17 PROCEDURE — 84484 ASSAY OF TROPONIN QUANT: CPT | Performed by: EMERGENCY MEDICINE

## 2021-04-17 PROCEDURE — 96360 HYDRATION IV INFUSION INIT: CPT

## 2021-04-17 PROCEDURE — P9612 CATHETERIZE FOR URINE SPEC: HCPCS

## 2021-04-17 PROCEDURE — 93010 ELECTROCARDIOGRAM REPORT: CPT | Performed by: INTERNAL MEDICINE

## 2021-04-17 PROCEDURE — 70450 CT HEAD/BRAIN W/O DYE: CPT

## 2021-04-17 RX ORDER — DROPERIDOL 2.5 MG/ML
10 INJECTION, SOLUTION INTRAMUSCULAR; INTRAVENOUS ONCE
Status: COMPLETED | OUTPATIENT
Start: 2021-04-17 | End: 2021-04-17

## 2021-04-17 RX ADMIN — DROPERIDOL 10 MG: 2.5 INJECTION, SOLUTION INTRAMUSCULAR; INTRAVENOUS at 04:07

## 2021-04-17 RX ADMIN — SODIUM CHLORIDE 1000 ML: 9 INJECTION, SOLUTION INTRAVENOUS at 05:41

## 2021-04-17 NOTE — ED NOTES
Attempted to wake up patient ask for ride, pt does not open her eyes and grunts, goes back to sleep.      Long Branham RN  04/17/21 8772

## 2021-04-17 NOTE — CONSULTS
Patient's chart reviewed and discussed with nurse practitioner in emergency department.  Nurse practitioner states that she does not believe Access services are needed at this time.  Will reconsult if needed.

## 2021-04-17 NOTE — ED PROVIDER NOTES
EMERGENCY DEPARTMENT ENCOUNTER    Room Number:  07/07  Date of encounter:  4/17/2021  PCP: Provider, No Known  Historian: Patient      I used full protective equipment while examining this patient.  This includes face mask, gloves and protective eyewear.  I washed my hands before entering the room and immediately upon leaving the room      HPI:  Chief Complaint: Altered mental status  A complete HPI/ROS/PMH/PSH/SH/FH are unobtainable due to: Altered mental status    Context: Noelle Mehta is a 56 y.o. female who presents to the ED c/o altered mental status.  Patient was found in the parking lot of her hospital with altered mental status.  Patient reports that she had been drinking and doing crystal meth.  She states she has not slept in 5 days.  Patient has pressured speech and is speaking nonsensically.  She is unable to give any reliable history.    Review of Medical Records  I reviewed patient's last office visit with internal medicine doctor from 3/2/2021.  Patient has a history of bipolar disorder with 5 prior suicide attempts.  She has a history of drug abuse with cocaine, meth, alcohol.  Patient had recent bout in assisted.    PAST MEDICAL HISTORY  Active Ambulatory Problems     Diagnosis Date Noted   • No Active Ambulatory Problems     Resolved Ambulatory Problems     Diagnosis Date Noted   • No Resolved Ambulatory Problems     Past Medical History:   Diagnosis Date   • Epilepsy (CMS/HCC)    • H/O ETOH abuse    • Seizures (CMS/HCC)          PAST SURGICAL HISTORY  Past Surgical History:   Procedure Laterality Date   • BACK SURGERY           FAMILY HISTORY  No family history on file.      SOCIAL HISTORY  Social History     Socioeconomic History   • Marital status:      Spouse name: Not on file   • Number of children: Not on file   • Years of education: Not on file   • Highest education level: Not on file   Tobacco Use   • Smoking status: Current Every Day Smoker     Types: Cigarettes   • Smokeless tobacco:  Never Used   Substance and Sexual Activity   • Alcohol use: No     Comment: sober since 1/2018   • Drug use: No   • Sexual activity: Defer         ALLERGIES  Codeine and Latex        REVIEW OF SYSTEMS  Unobtainable secondary to altered mental status      PHYSICAL EXAM    I have reviewed the triage vital signs and nursing notes.    ED Triage Vitals   Temp Heart Rate Resp BP SpO2   04/17/21 0328 04/17/21 0328 04/17/21 0328 04/17/21 0400 04/17/21 0328   98.7 °F (37.1 °C) 109 20 133/95 95 %      Temp src Heart Rate Source Patient Position BP Location FiO2 (%)   04/17/21 0328 -- -- -- --   Tympanic           Physical Exam  GENERAL: Alert, disheveled, manic   HENT: head atraumatic, no nuchal rigidity  EYES: no scleral icterus, EOMI  CV: regular rhythm, regular rate, no murmur  RESPIRATORY: normal effort, CTA  ABDOMEN: soft, nontender  MUSCULOSKELETAL: no deformity, FROM, no calf swelling or tenderness  NEURO: alert, moves all extremities, follows commands  PSYCH: Pressured speech, tangential thoughts  SKIN: warm, dry        LAB RESULTS  Recent Results (from the past 24 hour(s))   Comprehensive Metabolic Panel    Collection Time: 04/17/21  4:29 AM    Specimen: Blood   Result Value Ref Range    Glucose 97 65 - 99 mg/dL    BUN 9 6 - 20 mg/dL    Creatinine 0.71 0.57 - 1.00 mg/dL    Sodium 140 136 - 145 mmol/L    Potassium 3.2 (L) 3.5 - 5.2 mmol/L    Chloride 101 98 - 107 mmol/L    CO2 26.1 22.0 - 29.0 mmol/L    Calcium 8.3 (L) 8.6 - 10.5 mg/dL    Total Protein 6.5 6.0 - 8.5 g/dL    Albumin 4.20 3.50 - 5.20 g/dL    ALT (SGPT) 32 1 - 33 U/L    AST (SGOT) 51 (H) 1 - 32 U/L    Alkaline Phosphatase 63 39 - 117 U/L    Total Bilirubin 0.4 0.0 - 1.2 mg/dL    eGFR Non African Amer 85 >60 mL/min/1.73    Globulin 2.3 gm/dL    A/G Ratio 1.8 g/dL    BUN/Creatinine Ratio 12.7 7.0 - 25.0    Anion Gap 12.9 5.0 - 15.0 mmol/L   Ethanol    Collection Time: 04/17/21  4:29 AM    Specimen: Blood   Result Value Ref Range    Ethanol 184 (H) 0 - 10  mg/dL    Ethanol % 0.184 %   CK    Collection Time: 04/17/21  4:29 AM    Specimen: Blood   Result Value Ref Range    Creatine Kinase 884 (H) 20 - 180 U/L   CBC Auto Differential    Collection Time: 04/17/21  4:29 AM    Specimen: Blood   Result Value Ref Range    WBC 9.22 3.40 - 10.80 10*3/mm3    RBC 4.06 3.77 - 5.28 10*6/mm3    Hemoglobin 13.5 12.0 - 15.9 g/dL    Hematocrit 40.1 34.0 - 46.6 %    MCV 98.8 (H) 79.0 - 97.0 fL    MCH 33.3 (H) 26.6 - 33.0 pg    MCHC 33.7 31.5 - 35.7 g/dL    RDW 13.1 12.3 - 15.4 %    RDW-SD 47.3 37.0 - 54.0 fl    MPV 10.0 6.0 - 12.0 fL    Platelets 244 140 - 450 10*3/mm3    Neutrophil % 66.8 42.7 - 76.0 %    Lymphocyte % 19.4 (L) 19.6 - 45.3 %    Monocyte % 11.3 5.0 - 12.0 %    Eosinophil % 1.4 0.3 - 6.2 %    Basophil % 0.8 0.0 - 1.5 %    Immature Grans % 0.3 0.0 - 0.5 %    Neutrophils, Absolute 6.16 1.70 - 7.00 10*3/mm3    Lymphocytes, Absolute 1.79 0.70 - 3.10 10*3/mm3    Monocytes, Absolute 1.04 (H) 0.10 - 0.90 10*3/mm3    Eosinophils, Absolute 0.13 0.00 - 0.40 10*3/mm3    Basophils, Absolute 0.07 0.00 - 0.20 10*3/mm3    Immature Grans, Absolute 0.03 0.00 - 0.05 10*3/mm3    nRBC 0.0 0.0 - 0.2 /100 WBC   Troponin    Collection Time: 04/17/21  4:29 AM    Specimen: Blood   Result Value Ref Range    Troponin T <0.010 0.000 - 0.030 ng/mL       Ordered the above labs and independently reviewed the results.    MEDICATIONS GIVEN IN ER    Medications   sodium chloride 0.9 % bolus 1,000 mL (1,000 mL Intravenous New Bag 4/17/21 0541)   droperidol (INAPSINE) injection 10 mg (10 mg Intramuscular Given 4/17/21 0407)         PROGRESS, DATA ANALYSIS, CONSULTS, AND MEDICAL DECISION MAKING    All labs have been independently reviewed by me.  All radiology studies have been reviewed by me and discussed with radiologist dictating the report.   EKG's independently viewed and interpreted by me.  Discussion below represents my analysis of pertinent findings related to patient's condition, differential  diagnosis, treatment plan and final disposition.    I have discussed case with Dr. Wilhelm, emergency room physician.  He has performed his own bedside examination and agrees with treatment plan.    ED Course as of Apr 17 0614   Sat Apr 17, 2021   0430 Patient presents to ER with altered mental status.  Patient reportedly had been drinking and smoking crystal meth.  Patient has not slept in several days.  Unable to obtain history from patient secondary to uncooperativeness and pressured speech.  Plan to medicate patient and reevaluate.    [EE]   0446 WBC: 9.22 [EE]   0446 Hemoglobin: 13.5 [EE]   0446 Recheck of pt.  She is resting comfortably, sleeping.      [EE]   0507 Creatine Kinase(!): 884 [EE]   0507 Ethanol(!): 184 [EE]   0514 Recheck of patient.  She is sleeping.  Stable vitals.    [EE]   0612 Care turned over to Blanche Mercedes,  pending sobriety.    [EE]      ED Course User Index  [EE] Nav Morton PA       AS OF 06:14 EDT VITALS:    BP - 114/73  HR - 109  TEMP - 98.7 °F (37.1 °C) (Tympanic)  O2 SATS - 96%        DIAGNOSIS  Final diagnoses:   Alcoholic intoxication with complication (CMS/HCC)   Methamphetamine abuse (CMS/HCC)         DISPOSITION  Pending           Nav Morton PA  04/17/21 0614

## 2021-04-17 NOTE — ED NOTES
Unable to complete EKG at this time. PT unwilling to be still during test process/PT fidgeting and moving around. MD notified.     Rodolfo Gan  04/17/21 0537

## 2021-04-17 NOTE — ED TRIAGE NOTES
Pt presents to er intoxicated.  Pt reports being found in [facility] parking lot by a stranger.  Pt reports that she is drunk but doesn't know what she drank, she has been awake for 5 days smoking crystal meth.  Pts speech is word salad.    Patient was placed in face mask during first look triage.  Patient was wearing a face mask throughout encounter.  I wore personal protective equipment throughout the encounter.  Hand hygiene was performed before and after patient encounter.

## 2021-04-17 NOTE — ED NOTES
EKG now completed; PT more cooperative with assistance from Yesenia and RN.     Rodolfo Gan  04/17/21 0659

## 2021-04-17 NOTE — ED PROVIDER NOTES
MD ATTESTATION NOTE  Patient was placed in face mask in first look and the following protective measures were taken unless additional measures were taken and documented below in the ED course. Patient was wearing facemask when I entered the room and throughout our encounter. I wore full protective equipment throughout this patient encounter including a face mask, and gloves. Hand hygiene was performed before donning protective equipment and after removal when leaving the room.    The ALAN and I have discussed this patient's history, physical exam, and treatment plan. I have reviewed the documentation and personally had a face to face interaction with the patient. I affirm the ALAN documentation and agree with their diagnostics, findings, treatment, plan, and disposition.  The attached note describes my personal findings.    Noelle Metha is a 56 y.o. female who presents to the ED c/o altered mental status.  Patient was found in the parking lot of the hospital, appeared confused.  Patient admits to drinking and smoking meth.  Patient is disorganized, pressured speech, tangential answers, unable to give history.    On exam:  General: NAD  Head: NCAT  ENT: Extraocular motion intact, pupils equal and round reactive to light, moist mucous membranes  Neck: Supple, trachea midline.  Cardiac: Tachycardic rate, regular rhythm  Lungs: Clear to auscultation bilaterally  Abdomen: Soft, nontender, no rebound tenderness/guarding/rigidity  : Deferred to ALAN  Extremities: Moves all extremities well, no peripheral edema  Neuro: ANO x2, no facial droop, no word finding difficulty or dysarthria but speech is pressured, answers are tangential, patient confused, moving all extremities well, sensation intact to light touch all extremities, intermittently following commands, no focal deficits, neuro exam limited  Skin: Warm, dry.    Medical Decision Making:  After the initial H&P, I discussed pertinent information from history and physical  exam with patient/family.  Discussed differential diagnosis.  Discussed plan for ED evaluation/work-up/treatment.  All questions answered.  Patient/family is agreeable with plan.    ED Course as of Apr 19 0802   Sat Apr 17, 2021   0430 Patient presents to ER with altered mental status.  Patient reportedly had been drinking and smoking crystal meth.  Patient has not slept in several days.  Unable to obtain history from patient secondary to uncooperativeness and pressured speech.  Plan to medicate patient and reevaluate.    [EE]   0446 WBC: 9.22 [EE]   0446 Hemoglobin: 13.5 [EE]   0446 Recheck of pt.  She is resting comfortably, sleeping.      [EE]   0507 Creatine Kinase(!): 884 [EE]   0507 Ethanol(!): 184 [EE]   0514 Recheck of patient.  She is sleeping.  Stable vitals.    [EE]   0612 Care turned over to Blanche Mercedes,  pending sobriety.    [EE]   0651 EKG independently viewed and contemporaneously interpreted by ED physician. Time: 6:50 AM.  Rate is 91.  Interpretation: Normal sinus rhythm, normal axis, normal QRS, no acute ST changes.    [JG]   0654 I was able to obtain cath urine.     [MS]   0947 Patient is awake and up getting herself dressed.    [MS]      ED Course User Index  [EE] Nav Morton PA  [JG] Alcon Wilhelm MD  [MS] Brigitte Mercedes, APRN       Diagnosis  Final diagnoses:   Alcoholic intoxication with complication (CMS/[facility])   Methamphetamine abuse (CMS/[facility])        Alcon Wilhelm MD  04/19/21 0802

## 2021-04-17 NOTE — CASE MANAGEMENT/SOCIAL WORK
Spoke with patient regarding current living situation, as well as need for detox/rehab services. Patient states that she has been sober since May of 2019 and relapsed. Patient states that she has been living at [facility] house paid for by [employer]. Per patient, she has not been home in a week and is unsure if she will be able to get back into apartment. Patient has key to apartment on her person and requests to be sent back to that address. This nurse stressed the importance of rehabilitation and cessation of ETOH/methamphetamine abuse. Supplied patient with Help is Available pamphlet. Cab voucher supplied and patient walked to waiting room to wait for cab. DENA Soler RN   
no

## 2021-04-17 NOTE — ED NOTES
Patient woke up, asking where phone was, told patient unsure if she had a phone when she came in. Pt has a back pack and another bag with her and a belongings bag. Informed patient she was found in parking lot intoxicated. Pt states she had phone with her last night. Call placed to security to ask about phone states they have had nothing turned in.      Long Branham RN  04/17/21 0981

## 2021-04-17 NOTE — ED NOTES
Pt moved on to back by this nurse and PA. CT called to try to attempt CT. Pt immediately rolled back onto her side again. Will attempt to get to CT.     Natasha Ibarra RN  04/17/21 0801

## 2021-04-17 NOTE — ED NOTES
Pt sleeping very soundly, awakens to gentle shaking briefly, then goes back to sleep. Unable to follow commands, both EKG and PCXR attempted but unsuccessful due to pt turning onto her side when repositioned. IV established without difficulty. Unable to obtain urine sample at this time.     Natasha Ibarra, RN  04/17/21 0551

## 2021-04-17 NOTE — ED NOTES
Pt asked CCP to dispose of her fifth whiskey, poured down the drain and disposed per patient request by CCP.      Long Branham RN  04/17/21 1860

## 2021-04-17 NOTE — ED NOTES
Patient states she does not have any where to go or know how to get there, CCP to speak with patient about place to go and ride there.      Long Branham RN  04/17/21 0911

## 2021-04-17 NOTE — ED NOTES
Pt given cab voucher by CCP and walked to waiting room to wait for cab.      Long Branham RN  04/17/21 1957

## 2021-08-04 PROCEDURE — 99283 EMERGENCY DEPT VISIT LOW MDM: CPT

## 2021-08-05 ENCOUNTER — HOSPITAL ENCOUNTER (EMERGENCY)
Facility: HOSPITAL | Age: 56
Discharge: HOME | End: 2021-08-05
Attending: EMERGENCY MEDICINE | Admitting: EMERGENCY MEDICINE

## 2021-08-05 VITALS
SYSTOLIC BLOOD PRESSURE: 129 MMHG | OXYGEN SATURATION: 100 % | HEIGHT: 68 IN | RESPIRATION RATE: 19 BRPM | DIASTOLIC BLOOD PRESSURE: 82 MMHG | WEIGHT: 190 LBS | BODY MASS INDEX: 28.79 KG/M2 | HEART RATE: 96 BPM | TEMPERATURE: 97.1 F

## 2021-08-05 DIAGNOSIS — F19.10 SUBSTANCE ABUSE (HCC): Primary | ICD-10-CM

## 2021-08-05 DIAGNOSIS — R20.2 FORMICATION: ICD-10-CM

## 2021-08-05 LAB
AMPHET+METHAMPHET UR QL: POSITIVE
BARBITURATES UR QL SCN: NEGATIVE
BENZODIAZ UR QL SCN: NEGATIVE
CANNABINOIDS SERPL QL: NEGATIVE
COCAINE UR QL: POSITIVE
ETHANOL BLD-MCNC: 94 MG/DL (ref 0–10)
ETHANOL UR QL: 0.09 %
METHADONE UR QL SCN: NEGATIVE
OPIATES UR QL: NEGATIVE
OXYCODONE UR QL SCN: NEGATIVE

## 2021-08-05 PROCEDURE — 80307 DRUG TEST PRSMV CHEM ANLYZR: CPT | Performed by: EMERGENCY MEDICINE

## 2021-08-05 PROCEDURE — 82077 ASSAY SPEC XCP UR&BREATH IA: CPT | Performed by: EMERGENCY MEDICINE

## 2021-08-05 NOTE — ED NOTES
RN wearing all appropriate PPE during entire encounter with patient.        Behringer, Catherine, RN  08/05/21 0151

## 2021-08-05 NOTE — ED NOTES
Patient states that she has worms under her toenails and coming out of her nose when she blows her nose, patient showed this nurse but no worms noted.  Patient states that she drank 1 pint of vodka today and has been using ICE for 4 months, reviewed this patient with NICOL Flores.     Nita Gr, RN  08/04/21 2100

## 2021-08-05 NOTE — ED NOTES
"Pt has several sores all over her body from \"worms\" in her skin and nails.      Behringer, Catherine, RN  08/05/21 0225    "

## 2021-08-05 NOTE — ED PROVIDER NOTES
EMERGENCY DEPARTMENT ENCOUNTER    Room Number:  11/11  Date of encounter:  8/5/2021  PCP: System, Provider Not In  Historian: Patient      HPI:  Chief Complaint: Seeing little white worms and feeling like bugs are crawling on her skin  A complete HPI/ROS/PMH/PSH/SH/FH are unobtainable due to: Patient with heavy meth use    Context: Noelle Mehta is a 56 y.o. female who presents to the ED c/o a sensation like there insects crawling on her under her skin as well as reporting that she has seen little white worms crawling out from underneath her nails and in her stool.  Patient reports that she has been heavily using meth for the past several months.  States she stopped using less than 3 days ago.  Denies alcohol.  No chest pain nausea vomiting shortness of breath fevers or chills.      PAST MEDICAL HISTORY  Active Ambulatory Problems     Diagnosis Date Noted   • No Active Ambulatory Problems     Resolved Ambulatory Problems     Diagnosis Date Noted   • No Resolved Ambulatory Problems     Past Medical History:   Diagnosis Date   • Epilepsy (CMS/HCC)    • H/O ETOH abuse    • Seizures (CMS/HCC)          PAST SURGICAL HISTORY  Past Surgical History:   Procedure Laterality Date   • BACK SURGERY           FAMILY HISTORY  No family history on file.      SOCIAL HISTORY  Social History     Socioeconomic History   • Marital status:      Spouse name: Not on file   • Number of children: Not on file   • Years of education: Not on file   • Highest education level: Not on file   Tobacco Use   • Smoking status: Current Every Day Smoker     Types: Cigarettes   • Smokeless tobacco: Never Used   Substance and Sexual Activity   • Alcohol use: No     Comment: sober since 1/2018   • Drug use: No   • Sexual activity: Defer         ALLERGIES  Codeine and Latex        REVIEW OF SYSTEMS  Review of Systems     All systems reviewed and negative except for those discussed in HPI.       PHYSICAL EXAM    I have reviewed the triage vital signs  and nursing notes.    ED Triage Vitals   Temp Heart Rate Resp BP SpO2   08/04/21 2016 08/04/21 2016 08/05/21 0226 08/04/21 2058 08/04/21 2016   97.1 °F (36.2 °C) 105 18 109/64 96 %      Temp src Heart Rate Source Patient Position BP Location FiO2 (%)   08/04/21 2016 -- -- -- --   Tympanic           Physical Exam  GENERAL: Anxious  HENT: nares patent  EYES: no scleral icterus  CV: regular rhythm, regular rate  RESPIRATORY: normal effort, clear to auscultation bilaterally  ABDOMEN: soft, nontender, nondistended; visual rectal inspection does not reveal any pinworms  MUSCULOSKELETAL: no deformity  NEURO: alert, moves all extremities, follows commands  SKIN: warm, dry, lots of superficial self-inflicted abrasions to forearms and back no signs of superinfection, no signs of insects or worms        LAB RESULTS  Recent Results (from the past 24 hour(s))   Urine Drug Screen - Urine, Clean Catch    Collection Time: 08/05/21  2:21 AM    Specimen: Urine, Clean Catch   Result Value Ref Range    Amphet/Methamphet, Screen Positive (A) Negative    Barbiturates Screen, Urine Negative Negative    Benzodiazepine Screen, Urine Negative Negative    Cocaine Screen, Urine Positive (A) Negative    Opiate Screen Negative Negative    THC, Screen, Urine Negative Negative    Methadone Screen, Urine Negative Negative    Oxycodone Screen, Urine Negative Negative   Ethanol    Collection Time: 08/05/21  2:26 AM    Specimen: Blood   Result Value Ref Range    Ethanol 94 (H) 0 - 10 mg/dL    Ethanol % 0.094 %       Ordered the above labs and independently reviewed the results.        RADIOLOGY  No Radiology Exams Resulted Within Past 24 Hours    I ordered the above noted radiological studies. Reviewed by me and discussed with radiologist.  See dictation for official radiology interpretation.      PROCEDURES    Procedures      MEDICATIONS GIVEN IN ER    Medications - No data to display      PROGRESS, DATA ANALYSIS, CONSULTS, AND MEDICAL DECISION  MAKING    All labs have been independently reviewed by me.  All radiology studies have been reviewed by me and discussed with radiologist dictating the report.   EKG's independently viewed and interpreted by me.  Discussion below represents my analysis of pertinent findings related to patient's condition, differential diagnosis, treatment plan and final disposition.      Reevaluation: Patient is feeling better.  States that she does not see any worms.  States that she does not feel like bugs are crawling on her skin presently.  I have offered patient a chance to speak with the access center.  Patient states she would prefer to go home.           PPE: Both the patient and I wore a surgical mask throughout the entire patient encounter. I wore protective goggles.     AS OF 07:24 EDT VITALS:    BP - 129/82  HR - 96  TEMP - 97.1 °F (36.2 °C) (Tympanic)  O2 SATS - 100%        DIAGNOSIS  Final diagnoses:   Substance abuse (CMS/[facility])   Formication         DISPOSITION  Discharge           Param Keller MD  08/05/21 2855

## 2021-08-05 NOTE — ED NOTES
Friend of pt is Fiona Cid, her number is [phone]. Call her when pt gets discharged.     Jaymie Lauren, RN  08/04/21 5001

## 2022-03-05 ENCOUNTER — HOSPITAL ENCOUNTER (EMERGENCY)
Facility: HOSPITAL | Age: 57
Discharge: PSYCHIATRIC OR SUBSTANCE USE FACILITY | End: 2022-03-07
Attending: EMERGENCY MEDICINE | Admitting: EMERGENCY MEDICINE

## 2022-03-05 DIAGNOSIS — Z86.59 HISTORY OF BIPOLAR DISORDER: ICD-10-CM

## 2022-03-05 DIAGNOSIS — F29 PSYCHOSIS, UNSPECIFIED PSYCHOSIS TYPE: Primary | ICD-10-CM

## 2022-03-05 DIAGNOSIS — Z59.00 HOMELESSNESS: ICD-10-CM

## 2022-03-05 DIAGNOSIS — F15.10 METHAMPHETAMINE ABUSE: ICD-10-CM

## 2022-03-05 DIAGNOSIS — E87.6 HYPOKALEMIA: ICD-10-CM

## 2022-03-05 LAB
ALBUMIN SERPL-MCNC: 4.6 G/DL (ref 3.5–5.2)
ALBUMIN/GLOB SERPL: 1.6 G/DL
ALP SERPL-CCNC: 80 U/L (ref 39–117)
ALT SERPL W P-5'-P-CCNC: 37 U/L (ref 1–33)
AMORPH URATE CRY URNS QL MICRO: ABNORMAL /HPF
AMPHET+METHAMPHET UR QL: POSITIVE
ANION GAP SERPL CALCULATED.3IONS-SCNC: 13 MMOL/L (ref 5–15)
APAP SERPL-MCNC: <5 MCG/ML (ref 0–30)
AST SERPL-CCNC: 42 U/L (ref 1–32)
BACTERIA UR QL AUTO: ABNORMAL /HPF
BARBITURATES UR QL SCN: NEGATIVE
BASOPHILS # BLD AUTO: 0.04 10*3/MM3 (ref 0–0.2)
BASOPHILS NFR BLD AUTO: 0.4 % (ref 0–1.5)
BENZODIAZ UR QL SCN: NEGATIVE
BILIRUB SERPL-MCNC: 0.5 MG/DL (ref 0–1.2)
BILIRUB UR QL STRIP: NEGATIVE
BUN SERPL-MCNC: 13 MG/DL (ref 6–20)
BUN/CREAT SERPL: 15.1 (ref 7–25)
CALCIUM SPEC-SCNC: 9 MG/DL (ref 8.6–10.5)
CANNABINOIDS SERPL QL: NEGATIVE
CHLORIDE SERPL-SCNC: 107 MMOL/L (ref 98–107)
CLARITY UR: ABNORMAL
CO2 SERPL-SCNC: 21 MMOL/L (ref 22–29)
COCAINE UR QL: NEGATIVE
COLOR UR: YELLOW
CREAT SERPL-MCNC: 0.86 MG/DL (ref 0.57–1)
DEPRECATED RDW RBC AUTO: 46.1 FL (ref 37–54)
EGFRCR SERPLBLD CKD-EPI 2021: 79.4 ML/MIN/1.73
EOSINOPHIL # BLD AUTO: 0.27 10*3/MM3 (ref 0–0.4)
EOSINOPHIL NFR BLD AUTO: 2.9 % (ref 0.3–6.2)
ERYTHROCYTE [DISTWIDTH] IN BLOOD BY AUTOMATED COUNT: 12.9 % (ref 12.3–15.4)
ETHANOL BLD-MCNC: <10 MG/DL (ref 0–10)
ETHANOL UR QL: <0.01 %
GLOBULIN UR ELPH-MCNC: 2.8 GM/DL
GLUCOSE SERPL-MCNC: 90 MG/DL (ref 65–99)
GLUCOSE UR STRIP-MCNC: NEGATIVE MG/DL
HCT VFR BLD AUTO: 40.4 % (ref 34–46.6)
HGB BLD-MCNC: 14 G/DL (ref 12–15.9)
HGB UR QL STRIP.AUTO: NEGATIVE
HYALINE CASTS UR QL AUTO: ABNORMAL /LPF
IMM GRANULOCYTES # BLD AUTO: 0.03 10*3/MM3 (ref 0–0.05)
IMM GRANULOCYTES NFR BLD AUTO: 0.3 % (ref 0–0.5)
KETONES UR QL STRIP: ABNORMAL
LEUKOCYTE ESTERASE UR QL STRIP.AUTO: ABNORMAL
LYMPHOCYTES # BLD AUTO: 1.75 10*3/MM3 (ref 0.7–3.1)
LYMPHOCYTES NFR BLD AUTO: 18.9 % (ref 19.6–45.3)
MAGNESIUM SERPL-MCNC: 2.2 MG/DL (ref 1.6–2.6)
MCH RBC QN AUTO: 33.6 PG (ref 26.6–33)
MCHC RBC AUTO-ENTMCNC: 34.7 G/DL (ref 31.5–35.7)
MCV RBC AUTO: 96.9 FL (ref 79–97)
METHADONE UR QL SCN: NEGATIVE
MONOCYTES # BLD AUTO: 0.66 10*3/MM3 (ref 0.1–0.9)
MONOCYTES NFR BLD AUTO: 7.1 % (ref 5–12)
NEUTROPHILS NFR BLD AUTO: 6.51 10*3/MM3 (ref 1.7–7)
NEUTROPHILS NFR BLD AUTO: 70.4 % (ref 42.7–76)
NITRITE UR QL STRIP: NEGATIVE
NRBC BLD AUTO-RTO: 0 /100 WBC (ref 0–0.2)
OPIATES UR QL: NEGATIVE
OXYCODONE UR QL SCN: NEGATIVE
PH UR STRIP.AUTO: 8 [PH] (ref 5–8)
PLATELET # BLD AUTO: 213 10*3/MM3 (ref 140–450)
PMV BLD AUTO: 10.6 FL (ref 6–12)
POTASSIUM SERPL-SCNC: 2.9 MMOL/L (ref 3.5–5.2)
PROT SERPL-MCNC: 7.4 G/DL (ref 6–8.5)
PROT UR QL STRIP: NEGATIVE
QT INTERVAL: 401 MS
RBC # BLD AUTO: 4.17 10*6/MM3 (ref 3.77–5.28)
RBC # UR STRIP: ABNORMAL /HPF
REF LAB TEST METHOD: ABNORMAL
SALICYLATES SERPL-MCNC: <0.3 MG/DL
SARS-COV-2 RNA PNL SPEC NAA+PROBE: NOT DETECTED
SODIUM SERPL-SCNC: 141 MMOL/L (ref 136–145)
SP GR UR STRIP: 1.02 (ref 1–1.03)
SQUAMOUS #/AREA URNS HPF: ABNORMAL /HPF
UROBILINOGEN UR QL STRIP: ABNORMAL
WBC # UR STRIP: ABNORMAL /HPF
WBC NRBC COR # BLD: 9.26 10*3/MM3 (ref 3.4–10.8)

## 2022-03-05 PROCEDURE — 82077 ASSAY SPEC XCP UR&BREATH IA: CPT | Performed by: EMERGENCY MEDICINE

## 2022-03-05 PROCEDURE — 80179 DRUG ASSAY SALICYLATE: CPT | Performed by: EMERGENCY MEDICINE

## 2022-03-05 PROCEDURE — 90791 PSYCH DIAGNOSTIC EVALUATION: CPT

## 2022-03-05 PROCEDURE — 80053 COMPREHEN METABOLIC PANEL: CPT | Performed by: EMERGENCY MEDICINE

## 2022-03-05 PROCEDURE — 80307 DRUG TEST PRSMV CHEM ANLYZR: CPT | Performed by: EMERGENCY MEDICINE

## 2022-03-05 PROCEDURE — 80143 DRUG ASSAY ACETAMINOPHEN: CPT | Performed by: EMERGENCY MEDICINE

## 2022-03-05 PROCEDURE — 87635 SARS-COV-2 COVID-19 AMP PRB: CPT | Performed by: EMERGENCY MEDICINE

## 2022-03-05 PROCEDURE — 83735 ASSAY OF MAGNESIUM: CPT | Performed by: EMERGENCY MEDICINE

## 2022-03-05 PROCEDURE — 81001 URINALYSIS AUTO W/SCOPE: CPT | Performed by: EMERGENCY MEDICINE

## 2022-03-05 PROCEDURE — 93010 ELECTROCARDIOGRAM REPORT: CPT | Performed by: INTERNAL MEDICINE

## 2022-03-05 PROCEDURE — 99285 EMERGENCY DEPT VISIT HI MDM: CPT

## 2022-03-05 PROCEDURE — 85025 COMPLETE CBC W/AUTO DIFF WBC: CPT | Performed by: EMERGENCY MEDICINE

## 2022-03-05 PROCEDURE — 93005 ELECTROCARDIOGRAM TRACING: CPT | Performed by: EMERGENCY MEDICINE

## 2022-03-05 RX ORDER — QUETIAPINE 300 MG/1
300 TABLET, FILM COATED, EXTENDED RELEASE ORAL ONCE
Status: COMPLETED | OUTPATIENT
Start: 2022-03-05 | End: 2022-03-05

## 2022-03-05 RX ORDER — SODIUM CHLORIDE 0.9 % (FLUSH) 0.9 %
10 SYRINGE (ML) INJECTION AS NEEDED
Status: DISCONTINUED | OUTPATIENT
Start: 2022-03-05 | End: 2022-03-07 | Stop reason: HOSPADM

## 2022-03-05 RX ORDER — POTASSIUM CHLORIDE 750 MG/1
40 TABLET, FILM COATED, EXTENDED RELEASE ORAL ONCE
Status: COMPLETED | OUTPATIENT
Start: 2022-03-05 | End: 2022-03-05

## 2022-03-05 RX ADMIN — POTASSIUM CHLORIDE 40 MEQ: 750 TABLET, EXTENDED RELEASE ORAL at 17:54

## 2022-03-05 RX ADMIN — QUETIAPINE 300 MG: 300 TABLET, EXTENDED RELEASE ORAL at 23:23

## 2022-03-05 SDOH — ECONOMIC STABILITY - HOUSING INSECURITY: HOMELESSNESS UNSPECIFIED: Z59.00
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
## 2022-03-05 NOTE — ED NOTES
Patient currently displaying less flight of ideas and able to have coherent conversation.      Bhavana Garcia, RN  03/05/22 9955

## 2022-03-06 RX ORDER — LEVETIRACETAM 500 MG/1
1000 TABLET ORAL 2 TIMES DAILY
Status: DISCONTINUED | OUTPATIENT
Start: 2022-03-06 | End: 2022-03-07 | Stop reason: HOSPADM

## 2022-03-06 RX ORDER — TOPIRAMATE 100 MG/1
200 TABLET, FILM COATED ORAL DAILY
Status: DISCONTINUED | OUTPATIENT
Start: 2022-03-06 | End: 2022-03-07 | Stop reason: HOSPADM

## 2022-03-06 RX ORDER — FLUOXETINE HYDROCHLORIDE 20 MG/1
40 CAPSULE ORAL DAILY
Status: DISCONTINUED | OUTPATIENT
Start: 2022-03-06 | End: 2022-03-07 | Stop reason: HOSPADM

## 2022-03-06 RX ORDER — QUETIAPINE 300 MG/1
300 TABLET, FILM COATED, EXTENDED RELEASE ORAL NIGHTLY
Status: DISCONTINUED | OUTPATIENT
Start: 2022-03-06 | End: 2022-03-07 | Stop reason: HOSPADM

## 2022-03-06 RX ADMIN — LEVETIRACETAM 1000 MG: 500 TABLET, FILM COATED ORAL at 09:02

## 2022-03-06 RX ADMIN — LEVETIRACETAM 1000 MG: 500 TABLET, FILM COATED ORAL at 22:00

## 2022-03-06 RX ADMIN — QUETIAPINE FUMARATE 300 MG: 300 TABLET, EXTENDED RELEASE ORAL at 22:00

## 2022-03-06 RX ADMIN — FLUOXETINE HYDROCHLORIDE 40 MG: 20 CAPSULE ORAL at 09:04

## 2022-03-06 RX ADMIN — TOPIRAMATE 200 MG: 100 TABLET, FILM COATED ORAL at 09:05

## 2022-03-06 NOTE — CASE MANAGEMENT/SOCIAL WORK
Received a call from Basilia at [facility], staffed with psychiatrist who declined pt thinking she would benefit from a drug rehab facility.

## 2022-03-06 NOTE — ED PROVIDER NOTES
EMERGENCY DEPARTMENT ENCOUNTER    Room number:  05/05  Date Seen:  3/6/2022  Time of transfer:  20:00  PCP:  System, Provider Not In    Laboratory Results:  Recent Results (from the past 24 hour(s))   Comprehensive Metabolic Panel    Collection Time: 03/05/22  4:14 PM    Specimen: Blood   Result Value Ref Range    Glucose 90 65 - 99 mg/dL    BUN 13 6 - 20 mg/dL    Creatinine 0.86 0.57 - 1.00 mg/dL    Sodium 141 136 - 145 mmol/L    Potassium 2.9 (L) 3.5 - 5.2 mmol/L    Chloride 107 98 - 107 mmol/L    CO2 21.0 (L) 22.0 - 29.0 mmol/L    Calcium 9.0 8.6 - 10.5 mg/dL    Total Protein 7.4 6.0 - 8.5 g/dL    Albumin 4.60 3.50 - 5.20 g/dL    ALT (SGPT) 37 (H) 1 - 33 U/L    AST (SGOT) 42 (H) 1 - 32 U/L    Alkaline Phosphatase 80 39 - 117 U/L    Total Bilirubin 0.5 0.0 - 1.2 mg/dL    Globulin 2.8 gm/dL    A/G Ratio 1.6 g/dL    BUN/Creatinine Ratio 15.1 7.0 - 25.0    Anion Gap 13.0 5.0 - 15.0 mmol/L    eGFR 79.4 >60.0 mL/min/1.73   Magnesium    Collection Time: 03/05/22  4:14 PM    Specimen: Blood   Result Value Ref Range    Magnesium 2.2 1.6 - 2.6 mg/dL   Ethanol    Collection Time: 03/05/22  4:14 PM    Specimen: Blood   Result Value Ref Range    Ethanol <10 0 - 10 mg/dL    Ethanol % <0.010 %   Salicylate Level    Collection Time: 03/05/22  4:14 PM    Specimen: Blood   Result Value Ref Range    Salicylate <0.3 <=30.0 mg/dL   Acetaminophen Level    Collection Time: 03/05/22  4:14 PM    Specimen: Blood   Result Value Ref Range    Acetaminophen <5.0 0.0 - 30.0 mcg/mL   CBC Auto Differential    Collection Time: 03/05/22  4:14 PM    Specimen: Blood   Result Value Ref Range    WBC 9.26 3.40 - 10.80 10*3/mm3    RBC 4.17 3.77 - 5.28 10*6/mm3    Hemoglobin 14.0 12.0 - 15.9 g/dL    Hematocrit 40.4 34.0 - 46.6 %    MCV 96.9 79.0 - 97.0 fL    MCH 33.6 (H) 26.6 - 33.0 pg    MCHC 34.7 31.5 - 35.7 g/dL    RDW 12.9 12.3 - 15.4 %    RDW-SD 46.1 37.0 - 54.0 fl    MPV 10.6 6.0 - 12.0 fL    Platelets 213 140 - 450 10*3/mm3    Neutrophil % 70.4  42.7 - 76.0 %    Lymphocyte % 18.9 (L) 19.6 - 45.3 %    Monocyte % 7.1 5.0 - 12.0 %    Eosinophil % 2.9 0.3 - 6.2 %    Basophil % 0.4 0.0 - 1.5 %    Immature Grans % 0.3 0.0 - 0.5 %    Neutrophils, Absolute 6.51 1.70 - 7.00 10*3/mm3    Lymphocytes, Absolute 1.75 0.70 - 3.10 10*3/mm3    Monocytes, Absolute 0.66 0.10 - 0.90 10*3/mm3    Eosinophils, Absolute 0.27 0.00 - 0.40 10*3/mm3    Basophils, Absolute 0.04 0.00 - 0.20 10*3/mm3    Immature Grans, Absolute 0.03 0.00 - 0.05 10*3/mm3    nRBC 0.0 0.0 - 0.2 /100 WBC   ECG 12 Lead    Collection Time: 03/05/22  5:16 PM   Result Value Ref Range    QT Interval 401 ms   [facility] NP SWAB IN TRANSPORT MEDIA 8-12 HR TAT - Swab, Nasopharynx    Collection Time: 03/05/22  5:55 PM    Specimen: Nasopharynx; Swab   Result Value Ref Range    COVID19 Not Detected Not Detected - Ref. Range   Urinalysis With Microscopic If Indicated (No Culture) - Urine, Catheter    Collection Time: 03/05/22  6:12 PM    Specimen: Urine, Catheter   Result Value Ref Range    Color, UA Yellow Yellow, Straw    Appearance, UA Turbid (A) Clear    pH, UA 8.0 5.0 - 8.0    Specific Gravity, UA 1.019 1.005 - 1.030    Glucose, UA Negative Negative    Ketones, UA 15 mg/dL (1+) (A) Negative    Bilirubin, UA Negative Negative    Blood, UA Negative Negative    Protein, UA Negative Negative    Leuk Esterase, UA Small (1+) (A) Negative    Nitrite, UA Negative Negative    Urobilinogen, UA 1.0 E.U./dL 0.2 - 1.0 E.U./dL   Urine Drug Screen - Urine, Clean Catch    Collection Time: 03/05/22  6:12 PM    Specimen: Urine, Clean Catch   Result Value Ref Range    Amphet/Methamphet, Screen Positive (A) Negative    Barbiturates Screen, Urine Negative Negative    Benzodiazepine Screen, Urine Negative Negative    Cocaine Screen, Urine Negative Negative    Opiate Screen Negative Negative    THC, Screen, Urine Negative Negative    Methadone Screen, Urine Negative Negative    Oxycodone Screen, Urine Negative  Negative   Urinalysis, Microscopic Only - Urine, Catheter    Collection Time: 03/05/22  6:12 PM    Specimen: Urine, Catheter   Result Value Ref Range    RBC, UA None Seen None Seen, 0-2 /HPF    WBC, UA 6-12 (A) None Seen, 0-2 /HPF    Bacteria, UA 4+ (A) None Seen /HPF    Squamous Epithelial Cells, UA 0-2 None Seen, 0-2 /HPF    Hyaline Casts, UA None Seen None Seen /LPF    Amorphous Crystals, UA Large/3+ None Seen /HPF    Methodology Manual Light Microscopy      I reviewed the above results.    Radiology:  No orders to display     I reviewed the above results    Medications ordered in ED:  Medications   sodium chloride 0.9 % flush 10 mL (has no administration in time range)   topiramate (TOPAMAX) tablet 200 mg (has no administration in time range)   levETIRAcetam (KEPPRA) tablet 1,000 mg (has no administration in time range)   FLUoxetine (PROzac) capsule 40 mg (has no administration in time range)   QUEtiapine XR (SEROquel XR) 24 hr tablet 300 mg (has no administration in time range)   potassium chloride (K-DUR,KLOR-CON) ER tablet 40 mEq (40 mEq Oral Given 3/5/22 1754)   QUEtiapine XR (SEROquel XR) 24 hr tablet 300 mg (300 mg Oral Given 3/5/22 2323)       ED Course as of 03/06/22 0604   Sat Mar 05, 2022   1711 The nurse caring for the patient got in touch with the patient's ex-.  He states that about 4 weeks ago he talked with her and she told him that she felt as if there was worms in her body and under her skin.  Soon after that she was admitted to our [facility].  The ex- received a call when she was an inpatient at our [facility] from the patient.  He did not hear anything about his ex-wife (this patient) until today.  He states that there was a friend named Noelle who he believes she was staying with that he spoke with them and that she was going to call an ambulance to take her to the hospital.  The ex- was aware that there was a gentleman that came and picked up Noelle and left the  house with Noelle.  This was the house that both the patient and Noelle was staying in.  Just to clarify the patient's name is Noelle and the friend she was staying with was Noelle as well according to her ex-.  The ex- states that he has been nowhere near the house or neither has Delmis.  I believe Jayla is there daughter.  He is unaware of any other events or medicines that she is taken. [MM]   1714 I have reevaluated the patient.  Patient still has normal vital signs.  O2 sats 100% on room air.  Blood pressure is normal and heart rate is normal.  I talked with her about the conversation that the nurse had with her ex-.  She states that she thought she heard them talking outside the house she was at earlier.  I tried to clarify about the medicines and potential taking too much medicines.  Patient again could not specify the medicines that she took or how much she took.  She denied any suicidal ideation or attempt.  But at the same time she said that she was just praying to God for help and the angels came to help her.  Her affect has been the same she is anxious and has some pressured speech and is tangential in thought.  She will answer questions and follow commands. [MM]   1719 EKG reading  EKG was done at 1716 a rate of 86 it is normal sinus rhythm with narrow complex  Normal axis there are some nonspecific change.  I do not appreciate any acute injury pattern  QT looks normal  I compared to previous EK from 4/17/2020 and it looks similar.  G s [MM]   1752 I did talk with the access nurse.  They reviewed some history on the patient.  They want a wait till the urine tox screen is back.  They do plan on coming and seeing her. [MM]   2228 Spoke with the access center, still attempting to find placement. [DOMINICK]   2229 Ethanol %: <0.010 [DOMINICK]   2229 Amphet/Methamphet, Screen(!): Positive [DOMINICK]   2229 Leukocytes, UA(!): Small (1+) [DOMINICK]   2229 WBC, UA(!): 6-12 [DOMINICK]   2229 Bacteria, UA(!): 4+ [DOMINICK]   2230  Potassium(!): 2.9 [DOMINICK]   2230 COVID19: Not Detected [DOMINICK]   2242 Patient rechecked, sitting in bed, patient has flight of ideas and disorganized thought content.  Patient stating that she needs to check her daughter, call her , and multiple other statements.  Refocused patient and asked if she had any UTI symptoms, patient denies any UTI symptoms. [DOMINICK]   2245 Patient's home medicines reviewed, she takes 300 mg Seroquel nightly, will order her evening dose. [DOMINICK]   Sun Mar 06, 2022   0243 Patient's case has been reviewed by [facility], they have declined to accept the patient.  We will attempt to find placement at [facility] in the morning after discharges have occurred. [DOMINICK]   0501 Have ordered inpatient psychiatric consult due to patient's extended stay in the ER, as well as a diet, and her home medicines. [DOMINICK]   0603 Patient presentation, history, lab results, and disposition discussed with and turned over to ALVARADO Parker PA-C. [DOMINICK]      ED Course User Index  [DOMINICK] Aly Hui PA  [MM] Canelo Rodriguez MD         Progress and Consult Notes:  20:00:  Patient care transferred from Dr. Rodriguez pending psychiatric placement.    Diagnosis:  Final diagnoses:   Psychosis, unspecified psychosis type (HCC)   Methamphetamine abuse (HCC)   History of bipolar disorder   Hypokalemia   Homelessness       Disposition:  Pending psychiatric placement.      Follow Up:  No follow-up provider specified.    RX:     Medication List      No changes were made to your prescriptions during this visit.         Provider attestation:  I personally reviewed the past medical history, past surgical history, social history, family history, current medications, and allergies as they appear in the chart.    The patient was seen and examined by myself and Dr. Rodriguez, who agrees with plan.          Aly Hui PA  03/06/22 0604

## 2022-03-06 NOTE — CONSULTS
"58 yo white female evaluated in the ED (Room#5) BIB EMS. Patient recently discharged from [facility]. History of bipolar disorder, substance abuse including methamphetamines and alcohol. BAL negative. UDS positive for amph/methamphetamines.     Patient unkempt. Multiple bags and notebooks on the bed that she is sorting through. Patient has clothes in the sink and water is running out of the sink.     Introduced myself and patient states \"you are the only person I will talk to about my private stuff\". Patient is whispering and then doing sign language. Disorganized.     Patient denies current illicit drug or alcohol use. States she relapsed last march of 2021 \"due to working too hard\". Asked about compliancy with psychiatric meds and pt. Starts flipping through her notebooks attempting to locate the name of her pharmacy and meds stating \"they sent things to the wrong pharmacy\". Patient is poor historian. States she is  with 2 daughters. States she is not working. Does not receive disability. Currently homeless.     Asked about SI and patient gestures \"sort of\" with her hand. Does not elaborate. Denies HI.     History of multiple inpatient psychiatric admissions. History of multiple ED encounters here for substance abuse and psychosis including formication.     Patient told ED physician that \"angels were speaking to her right now\". Patient told ED staff that she was being held by a man against her will and when he left she was scared so took a hand full of each medication last night that she was prescribed but doesn't know what the meds were. Stated she took the meds because she was scared and talked to the lord and asked him to keep her safe. EMS states there were no pill bottles at the scene.     Patient stated she was with her ex  when picked up by EMS but ex  states he hasn't seen pt. In 3 weeks.     Patient disorganized. Poor historian. Appears paranoid, delusional. She is currently agreeable to " "inpatient psych admit. States \"my mind is messed up\".     Spoke with intake at [facility], [facility] and [facility] and all are on diversion. [facility] states they have beds and can fax eval over for review.       "

## 2022-03-06 NOTE — CASE MANAGEMENT/SOCIAL WORK
Spoke to Conchita at [facility]- said that I could fax information for review for possible admission- faxed info-   Called to follow up on fax, spoke to Basilia, waiting for a response.    Spoke to Lainey at [facility]- said that I could fax information for review- info faxed- waiting for a response.

## 2022-03-06 NOTE — CONSULTS
"Requesting Provider:  TERRI Mendoza  Reason for Consult: Psych consult    Date of admission: March 5, 2022  Date of assessment: March 6, 2022    Chief Complaint: None given    History of presenting illness: Patient is a 57 y.o. homeless female with a reported past psychiatric history of bipolar disorder, alcohol use disorder and amphetamine use disorder seen on consultation for the purpose of a psychiatric assessment.  The patient had presented to the ED via EMS after reportedly taking a handful of medications.  Of note, EMS did not find any pill bottles either empty or filled on the scene.  The patient is a very poor historian.  Apparently the patient was recently discharged from [facility].  UDS is positive for amphetamine/methamphetamines.  She has been making a number of bizarre statements while in the ED.  She presents as being hyper Alevism and reports auditory hallucinations.  She has been acting very bizarre.  She put her close in the sink and had water running out of it.  She has been very disorganized.  She vaguely indicated suicidal ideation when previously seen by access.  She has been noted to be paranoid and delusional.    On my examination, the patient is disorganized, paranoid and delusional.  She is an unreliable historian.  She continues to reiterate \"I was held against my will.\"  She refers to this person is \"Afshin.\"  The patient is unable to tell me when she was last at our [facility].  She is oriented only to self.    Past psychiatric history: See HPI    Past medical history:  Diagnoses: Seizure disorder  Medications:     Current Facility-Administered Medications   Medication Dose Route Frequency Provider Last Rate Last Admin   • FLUoxetine (PROzac) capsule 40 mg  40 mg Oral Daily Aly Hui PA   40 mg at 03/06/22 0904   • levETIRAcetam (KEPPRA) tablet 1,000 mg  1,000 mg Oral BID Aly Hui PA   1,000 mg at 03/06/22 0902   • QUEtiapine XR (SEROquel XR) 24 hr tablet 300 mg  300 mg " Oral Nightly Aly Hui PA       • sodium chloride 0.9 % flush 10 mL  10 mL Intravenous PRN Canelo Rodriguez MD       • topiramate (TOPAMAX) tablet 200 mg  200 mg Oral Daily Aly Hui PA   200 mg at 03/06/22 0905     Current Outpatient Medications   Medication Sig Dispense Refill   • Conj Estrog-Medroxyprogest Ace (PREMPRO PO) Take  by mouth.     • FLUoxetine HCl (PROZAC PO) Take  by mouth.     • levETIRAcetam (KEPPRA) 500 MG tablet Take 1 tablet by mouth 2 (Two) Times a Day. 60 tablet 3   • NAPROXEN PO Take  by mouth.       Medication allergies: Codeine, naltrexone, latex.    Social history: Noncontributory    Family history: Noncontributory    Substance abuse history: See HPI.  Patient has had previous tox screens positive for benzodiazepines and cocaine.  Serum alcohol level at presentation was 0.    Vital Signs    Temp:   97.4  Heart Rate: 92   Resp:  16  BP:  124/85    Mental Status Exam: The patient is found lying in bed.  She is awake and alert.  She is dressed in hospital attire.  She is wearing corrective lenses.  She is obese of body habitus.  She is oriented only to self.  Speech is at times nonsensical.  Mood is somewhat irritable.  Affect congruent.  She is paranoid delusional.  Thought processes are.  Judgment and insight are poor.  Memory is poor.    Assessment:   Amphetamine use disorder;  Amphetamine induced psychosis;  Bipolar disorder, not otherwise specified;  History of alcohol/benzodiazepine/cocaine use.    Treatment Plan:     Patient will require psychiatric hospitalization.  Attempts thus far to find a psychiatric bed have been unsuccessful.  Continue all medications at this time.  Obtain recent discharge [facility] from [facility]  .  Thank you for this consultation.  Please contact Access for any additional requests.

## 2022-03-06 NOTE — CASE MANAGEMENT/SOCIAL WORK
Received a call from Angelica at [facility]- . declined pt, feels her psychosis is drug related, and that she needs a drug and alcohol rehab program, which they don't offer.     Spoke to Loren at [facility]- on diversion- no [facility].    Spoke to Yulia at [facility].

## 2022-03-06 NOTE — NURSING NOTE
Melissa in intake at [facility] spoke with her psychiatrist and they have declined to take patient. Will place order for Dr. Newton to see patient this morning and [facility] states they may have discharges later today.

## 2022-03-06 NOTE — CASE MANAGEMENT/SOCIAL WORK
Spoke to Jolatna at the [facility]- they are unable to accept the pt at this time due to the accuity on their unit.

## 2022-03-06 NOTE — CONSULTS
Pt sitting on bed when arrived and was receptive to visit. Pt shared history of drug and alcohol abuse, current mental, emotional, and physical struggles, pursuit of sobriety, and uncertainty about her future. Described taking unknown pills out of fear and in response to perceived voices, knocks on doors and windows, etc. Pt periodically pulled notebooks out of bag while describing various past events. We prayed about past issues, for present peace, and for direction for her future. I left after prayer, pt in good spirits at that time.

## 2022-03-06 NOTE — CASE MANAGEMENT/SOCIAL WORK
Spoke to Meera at [facility]- no adult beds.    Spoke to Radha at [facility]- no availability for substance abuse treatment.    Spoke to Angelica at [facility]- said that information could be faxed for review.  Info faxed- waiting a response.

## 2022-03-06 NOTE — ED NOTES
Pt requested a . I called and did not get a answer so I will try again shortly      Clarke Romeo, RN  03/06/22 5076

## 2022-03-06 NOTE — ED NOTES
believes their conversation seemed to lift PT's spirits.  Pt appears to be in no distress      Clarke Romeo, RN  03/06/22 8419

## 2022-03-06 NOTE — CASE MANAGEMENT/SOCIAL WORK
Spoke to Valencia at [facility]- no beds- on adult diversion-  Spoke to Carmen at the [facility]- no beds.  Spoke to Maninder at the [facility]- said to fax info for review- fax sent- waiting for a response.

## 2022-03-07 VITALS
SYSTOLIC BLOOD PRESSURE: 102 MMHG | OXYGEN SATURATION: 94 % | RESPIRATION RATE: 18 BRPM | HEART RATE: 80 BPM | WEIGHT: 215.83 LBS | BODY MASS INDEX: 33.88 KG/M2 | TEMPERATURE: 97.5 F | DIASTOLIC BLOOD PRESSURE: 73 MMHG | HEIGHT: 67 IN

## 2022-03-07 RX ADMIN — FLUOXETINE HYDROCHLORIDE 40 MG: 20 CAPSULE ORAL at 11:27

## 2022-03-07 RX ADMIN — LEVETIRACETAM 1000 MG: 500 TABLET, FILM COATED ORAL at 11:26

## 2022-03-07 NOTE — ED NOTES
Gave pt a breakfast tray and assisted her comfortably in bed. This RN wearing all appropriate PPE during patient encounter. Hand hygiene performed before and during entering room.       Lauren Mulligan RN  03/07/22 6237

## 2022-03-07 NOTE — ED NOTES
Pt in mask throughout encounter. This ERT was in appropriate PPE while in pt room.       Rohit Virgen, PCT  03/06/22 6579

## 2022-03-07 NOTE — PROGRESS NOTES
The patient continues to express hopelessness and delusional thinking.  She is clearly in need of inpatient psychiatric hospitalization.  Unfortunately, we have been unable to locate a bed for her at this point.  We will continue to seek an inpatient psychiatric bed.

## 2022-03-07 NOTE — ED NOTES
This RN wearing all appropriate PPE during patient encounter. Hand hygiene performed before and during entering room.       Lauren Mulligan, RN  03/07/22 1122

## 2022-03-07 NOTE — CASE MANAGEMENT/SOCIAL WORK
Spoke with Mable with [facility] dispatch to arrange ambulance transport to [facility]. Awaiting return call with DENA Ashton RN

## 2022-03-07 NOTE — ED NOTES
Called dietary to get a breakfast tray for pt. Pt is resting at this time.     Lauren Mulligan RN  03/07/22 4265

## 2022-03-07 NOTE — ED NOTES
Spoke with [facility] about DC plan for patient. States that they cannot get anyone to accept her for inpatient psych treatment. Patient denies SI/HI. Plan is for Dr West to eval today for DC home vs possible Admission. Awaiting Dr Brett shaffer. TERRI Albert updated on plan. Patient resting comfortably, NAD. Will call for breakfast Pallavi Salinas, RN  03/07/22 7844       aPllavi Mireles, RN  03/07/22 5017

## 2022-03-07 NOTE — CASE MANAGEMENT/SOCIAL WORK
Per Jasvir at [facility] - will accept for inpatient psych admission    Accepting doc - Dr Kiran    Give report to [phone]    Information given to [facility] clinician to give to ED staff

## 2022-03-07 NOTE — ED PROVIDER NOTES
ED Course as of 03/07/22 1340   Sat Mar 05, 2022   1711 The nurse caring for the patient got in touch with the patient's ex-.  He states that about 4 weeks ago he talked with her and she told him that she felt as if there was worms in her body and under her skin.  Soon after that she was admitted to our [facility].  The ex- received a call when she was an inpatient at our [facility] from the patient.  He did not hear anything about his ex-wife (this patient) until today.  He states that there was a friend named Noelle who he believes she was staying with that he spoke with them and that she was going to call an ambulance to take her to the hospital.  The ex- was aware that there was a gentleman that came and picked up Noelle and left the house with Noelle.  This was the house that both the patient and Noelle was staying in.  Just to clarify the patient's name is Noelle and the friend she was staying with was Noelle as well according to her ex-.  The ex- states that he has been nowhere near the house or neither has Delmis.  I believe Jayla is there daughter.  He is unaware of any other events or medicines that she is taken. [MM]   1714 I have reevaluated the patient.  Patient still has normal vital signs.  O2 sats 100% on room air.  Blood pressure is normal and heart rate is normal.  I talked with her about the conversation that the nurse had with her ex-.  She states that she thought she heard them talking outside the house she was at earlier.  I tried to clarify about the medicines and potential taking too much medicines.  Patient again could not specify the medicines that she took or how much she took.  She denied any suicidal ideation or attempt.  But at the same time she said that she was just praying to God for help and the angels came to help her.  Her affect has been the same she is anxious and has some pressured speech and is tangential in thought.  She will answer  questions and follow commands. [MM]   1719 EKG reading  EKG was done at 1716 a rate of 86 it is normal sinus rhythm with narrow complex  Normal axis there are some nonspecific change.  I do not appreciate any acute injury pattern  QT looks normal  I compared to previous EK from 4/17/2020 and it looks similar.  G s [MM]   1754 I did talk with the access nurse.  They reviewed some history on the patient.  They want a wait till the urine tox screen is back.  They do plan on coming and seeing her. [MM]   2228 Spoke with the access center, still attempting to find placement. [DOMINICK]   2229 Ethanol %: <0.010 [DOMINICK]   2229 Amphet/Methamphet, Screen(!): Positive [DOMINICK]   2229 Leukocytes, UA(!): Small (1+) [DOMINICK]   2229 WBC, UA(!): 6-12 [DOMINICK]   2229 Bacteria, UA(!): 4+ [DOMINICK]   2230 Potassium(!): 2.9 [DOMINICK]   2230 COVID19: Not Detected [DOMINICK]   2242 Patient rechecked, sitting in bed, patient has flight of ideas and disorganized thought content.  Patient stating that she needs to check her daughter, call her , and multiple other statements.  Refocused patient and asked if she had any UTI symptoms, patient denies any UTI symptoms. [DOMINICK]   2245 Patient's home medicines reviewed, she takes 300 mg Seroquel nightly, will order her evening dose. [DOMINICK]   Sun Mar 06, 2022   0243 Patient's case has been reviewed by [facility], they have declined to accept the patient.  We will [facility] to [facility] in the morning after discharges have occurred. [DOMINICK]   0501 Have ordered inpatient psychiatric consult due to patient's extended stay in the ER, as well as a diet, and her home medicines. [DOMINICK]   0603 Patient presentation, history, lab results, and disposition discussed with and turned over to ALVARADO Parker PA-C. [DOMINICK]   1501 Patient presentation, history, lab results, and disposition discussed with and turned over to NICOL Clark. [AR]   1733 Spoke with [facility], they are waiting to hear back from [facility] in [place]  regional to see if they will accept the patient in transfer.  They have had multiple denials.  We will keep us posted as they get information. [MS]   Mon Mar 07, 2022   1046 I discussed with [facility] he reports that patient is currently awaiting repeat evaluation by psychiatrist.  Dr. West should be seeing her this morning and will determine if she doesn't fact need psychiatric hospitalization or if she can be safely discharged. [JR]   1315 Patient evaluated by psychiatrist again today and they are still recommending inpatient psychiatric hospitalization.  Patient has been in the emergency department for 45 hours and they have been unable to find placement.  She will be admitted to the hospital while we continue to look for a facility. [JR]   1338 Pallavi, charge nurse, reports that patient has a psychiatric bed available at [facility].  The accepting physician is Dr. Kiran.  [JR]      ED Course User Index  [AR] Neeru Parker PA  [DOMINICK] Aly Hui PA  [JR] Jason Salinas MD  [MM] Canelo Rodriguez MD  [MS] Brigitte Mercedes, NICOL         Final diagnoses:   Psychosis, unspecified psychosis type (HCC)   Methamphetamine abuse (HCC)   History of bipolar disorder   Hypokalemia   Homelessness         TRANSFER to [facility] for psychiatric care     Jason Salinas MD  03/07/22 9010

## 2022-08-21 ENCOUNTER — HOSPITAL ENCOUNTER (EMERGENCY)
Facility: HOSPITAL | Age: 57
Discharge: PSYCHIATRIC OR SUBSTANCE USE FACILITY | End: 2022-08-22
Attending: EMERGENCY MEDICINE | Admitting: EMERGENCY MEDICINE

## 2022-08-21 ENCOUNTER — APPOINTMENT (OUTPATIENT)
Dept: GENERAL RADIOLOGY | Facility: HOSPITAL | Age: 57
End: 2022-08-21

## 2022-08-21 DIAGNOSIS — T50.902A INTENTIONAL OVERDOSE, INITIAL ENCOUNTER: Primary | ICD-10-CM

## 2022-08-21 DIAGNOSIS — Z86.69 HISTORY OF EPILEPSY: ICD-10-CM

## 2022-08-21 DIAGNOSIS — R45.851 SUICIDAL IDEATION: ICD-10-CM

## 2022-08-21 LAB
ALBUMIN SERPL-MCNC: 4.2 G/DL (ref 3.5–5.2)
ALBUMIN/GLOB SERPL: 2.1 G/DL
ALP SERPL-CCNC: 77 U/L (ref 39–117)
ALT SERPL W P-5'-P-CCNC: 11 U/L (ref 1–33)
ANION GAP SERPL CALCULATED.3IONS-SCNC: 12.4 MMOL/L (ref 5–15)
APAP SERPL-MCNC: <5 MCG/ML (ref 0–30)
AST SERPL-CCNC: 9 U/L (ref 1–32)
BASOPHILS # BLD AUTO: 0.05 10*3/MM3 (ref 0–0.2)
BASOPHILS NFR BLD AUTO: 0.6 % (ref 0–1.5)
BILIRUB SERPL-MCNC: <0.2 MG/DL (ref 0–1.2)
BUN SERPL-MCNC: 18 MG/DL (ref 6–20)
BUN/CREAT SERPL: 17.1 (ref 7–25)
CALCIUM SPEC-SCNC: 8.9 MG/DL (ref 8.6–10.5)
CHLORIDE SERPL-SCNC: 106 MMOL/L (ref 98–107)
CO2 SERPL-SCNC: 22.6 MMOL/L (ref 22–29)
CREAT SERPL-MCNC: 1.05 MG/DL (ref 0.57–1)
DEPRECATED RDW RBC AUTO: 45.3 FL (ref 37–54)
EGFRCR SERPLBLD CKD-EPI 2021: 62.5 ML/MIN/1.73
EOSINOPHIL # BLD AUTO: 0.28 10*3/MM3 (ref 0–0.4)
EOSINOPHIL NFR BLD AUTO: 3.5 % (ref 0.3–6.2)
ERYTHROCYTE [DISTWIDTH] IN BLOOD BY AUTOMATED COUNT: 13.5 % (ref 12.3–15.4)
ETHANOL BLD-MCNC: <10 MG/DL (ref 0–10)
ETHANOL UR QL: <0.01 %
GLOBULIN UR ELPH-MCNC: 2 GM/DL
GLUCOSE SERPL-MCNC: 97 MG/DL (ref 65–99)
HCT VFR BLD AUTO: 38.4 % (ref 34–46.6)
HGB BLD-MCNC: 12.8 G/DL (ref 12–15.9)
IMM GRANULOCYTES # BLD AUTO: 0.02 10*3/MM3 (ref 0–0.05)
IMM GRANULOCYTES NFR BLD AUTO: 0.2 % (ref 0–0.5)
INR PPP: 0.97 (ref 0.9–1.1)
LYMPHOCYTES # BLD AUTO: 2.95 10*3/MM3 (ref 0.7–3.1)
LYMPHOCYTES NFR BLD AUTO: 36.8 % (ref 19.6–45.3)
MCH RBC QN AUTO: 30.9 PG (ref 26.6–33)
MCHC RBC AUTO-ENTMCNC: 33.3 G/DL (ref 31.5–35.7)
MCV RBC AUTO: 92.8 FL (ref 79–97)
MONOCYTES # BLD AUTO: 0.56 10*3/MM3 (ref 0.1–0.9)
MONOCYTES NFR BLD AUTO: 7 % (ref 5–12)
NEUTROPHILS NFR BLD AUTO: 4.15 10*3/MM3 (ref 1.7–7)
NEUTROPHILS NFR BLD AUTO: 51.9 % (ref 42.7–76)
NRBC BLD AUTO-RTO: 0 /100 WBC (ref 0–0.2)
PLATELET # BLD AUTO: 259 10*3/MM3 (ref 140–450)
PMV BLD AUTO: 10.1 FL (ref 6–12)
POTASSIUM SERPL-SCNC: 3.9 MMOL/L (ref 3.5–5.2)
PROT SERPL-MCNC: 6.2 G/DL (ref 6–8.5)
PROTHROMBIN TIME: 12.8 SECONDS (ref 11.7–14.2)
RBC # BLD AUTO: 4.14 10*6/MM3 (ref 3.77–5.28)
SALICYLATES SERPL-MCNC: <0.3 MG/DL
SARS-COV-2 RNA RESP QL NAA+PROBE: NOT DETECTED
SODIUM SERPL-SCNC: 141 MMOL/L (ref 136–145)
TROPONIN T SERPL-MCNC: <0.01 NG/ML (ref 0–0.03)
WBC NRBC COR # BLD: 8.01 10*3/MM3 (ref 3.4–10.8)

## 2022-08-21 PROCEDURE — 80143 DRUG ASSAY ACETAMINOPHEN: CPT | Performed by: PHYSICIAN ASSISTANT

## 2022-08-21 PROCEDURE — 80053 COMPREHEN METABOLIC PANEL: CPT | Performed by: PHYSICIAN ASSISTANT

## 2022-08-21 PROCEDURE — 93005 ELECTROCARDIOGRAM TRACING: CPT | Performed by: PHYSICIAN ASSISTANT

## 2022-08-21 PROCEDURE — 85610 PROTHROMBIN TIME: CPT | Performed by: PHYSICIAN ASSISTANT

## 2022-08-21 PROCEDURE — 80307 DRUG TEST PRSMV CHEM ANLYZR: CPT | Performed by: PHYSICIAN ASSISTANT

## 2022-08-21 PROCEDURE — 85025 COMPLETE CBC W/AUTO DIFF WBC: CPT | Performed by: PHYSICIAN ASSISTANT

## 2022-08-21 PROCEDURE — U0003 INFECTIOUS AGENT DETECTION BY NUCLEIC ACID (DNA OR RNA); SEVERE ACUTE RESPIRATORY SYNDROME CORONAVIRUS 2 (SARS-COV-2) (CORONAVIRUS DISEASE [COVID-19]), AMPLIFIED PROBE TECHNIQUE, MAKING USE OF HIGH THROUGHPUT TECHNOLOGIES AS DESCRIBED BY CMS-2020-01-R: HCPCS | Performed by: PHYSICIAN ASSISTANT

## 2022-08-21 PROCEDURE — 93010 ELECTROCARDIOGRAM REPORT: CPT | Performed by: INTERNAL MEDICINE

## 2022-08-21 PROCEDURE — 82077 ASSAY SPEC XCP UR&BREATH IA: CPT | Performed by: PHYSICIAN ASSISTANT

## 2022-08-21 PROCEDURE — 71045 X-RAY EXAM CHEST 1 VIEW: CPT

## 2022-08-21 PROCEDURE — 84484 ASSAY OF TROPONIN QUANT: CPT | Performed by: PHYSICIAN ASSISTANT

## 2022-08-21 PROCEDURE — 81001 URINALYSIS AUTO W/SCOPE: CPT | Performed by: PHYSICIAN ASSISTANT

## 2022-08-21 PROCEDURE — 80179 DRUG ASSAY SALICYLATE: CPT | Performed by: PHYSICIAN ASSISTANT

## 2022-08-21 PROCEDURE — 99285 EMERGENCY DEPT VISIT HI MDM: CPT

## 2022-08-21 RX ORDER — QUETIAPINE FUMARATE 50 MG/1
50 TABLET, FILM COATED ORAL NIGHTLY
COMMUNITY

## 2022-08-21 RX ORDER — SODIUM CHLORIDE 0.9 % (FLUSH) 0.9 %
10 SYRINGE (ML) INJECTION AS NEEDED
Status: DISCONTINUED | OUTPATIENT
Start: 2022-08-21 | End: 2022-08-22 | Stop reason: HOSPADM

## 2022-08-21 RX ORDER — TOPIRAMATE 100 MG/1
200 TABLET, FILM COATED ORAL DAILY
COMMUNITY

## 2022-08-21 RX ADMIN — SODIUM CHLORIDE 1000 ML: 9 INJECTION, SOLUTION INTRAVENOUS at 21:15

## 2022-08-22 VITALS
RESPIRATION RATE: 14 BRPM | WEIGHT: 230.16 LBS | OXYGEN SATURATION: 94 % | SYSTOLIC BLOOD PRESSURE: 93 MMHG | HEART RATE: 81 BPM | TEMPERATURE: 98.8 F | DIASTOLIC BLOOD PRESSURE: 55 MMHG | HEIGHT: 68 IN | BODY MASS INDEX: 34.88 KG/M2

## 2022-08-22 LAB
AMPHET+METHAMPHET UR QL: NEGATIVE
BACTERIA UR QL AUTO: ABNORMAL /HPF
BARBITURATES UR QL SCN: NEGATIVE
BENZODIAZ UR QL SCN: NEGATIVE
BILIRUB UR QL STRIP: NEGATIVE
CANNABINOIDS SERPL QL: NEGATIVE
CLARITY UR: CLEAR
COCAINE UR QL: NEGATIVE
COLOR UR: YELLOW
GLUCOSE UR STRIP-MCNC: NEGATIVE MG/DL
HGB UR QL STRIP.AUTO: NEGATIVE
HYALINE CASTS UR QL AUTO: ABNORMAL /LPF
KETONES UR QL STRIP: NEGATIVE
LEUKOCYTE ESTERASE UR QL STRIP.AUTO: ABNORMAL
METHADONE UR QL SCN: NEGATIVE
NITRITE UR QL STRIP: NEGATIVE
OPIATES UR QL: NEGATIVE
OXYCODONE UR QL SCN: NEGATIVE
PH UR STRIP.AUTO: 6.5 [PH] (ref 5–8)
PROT UR QL STRIP: NEGATIVE
QT INTERVAL: 392 MS
QT INTERVAL: 413 MS
RBC # UR STRIP: ABNORMAL /HPF
REF LAB TEST METHOD: ABNORMAL
SP GR UR STRIP: 1.01 (ref 1–1.03)
SQUAMOUS #/AREA URNS HPF: ABNORMAL /HPF
UROBILINOGEN UR QL STRIP: ABNORMAL
WBC # UR STRIP: ABNORMAL /HPF

## 2022-08-22 PROCEDURE — 93010 ELECTROCARDIOGRAM REPORT: CPT | Performed by: INTERNAL MEDICINE

## 2022-08-22 PROCEDURE — 90791 PSYCH DIAGNOSTIC EVALUATION: CPT

## 2022-08-22 PROCEDURE — 93005 ELECTROCARDIOGRAM TRACING: CPT | Performed by: EMERGENCY MEDICINE

## 2022-08-22 RX ORDER — TOPIRAMATE 100 MG/1
200 TABLET, FILM COATED ORAL ONCE
Status: COMPLETED | OUTPATIENT
Start: 2022-08-22 | End: 2022-08-22

## 2022-08-22 RX ADMIN — TOPIRAMATE 200 MG: 100 TABLET, FILM COATED ORAL at 19:09

## 2022-08-22 NOTE — ED TRIAGE NOTES
Pt arrives to ED via EMS from [facility] after an intentional OD on Seroquel (12-14 300 mg capsules), Keppra, and Vistaril, unknown amounts of last two.  Pt states they were having an issue with another resident and decided to take the pills. Per EMS pt took the pills around 19:30 this evening.    Patient was placed in face mask during first look triage.  Patient was wearing a face mask throughout encounter.  I wore personal protective equipment throughout the encounter.  Hand hygiene was performed before and after patient encounter.

## 2022-08-22 NOTE — ED PROVIDER NOTES
" EMERGENCY DEPARTMENT ENCOUNTER    Room Number:  07/07  Date of encounter:  8/22/2022  PCP: System, Provider Not In  Historian: Patient      HPI:  Chief Complaint: Overdose  A complete HPI/ROS/PMH/PSH/SH/FH are unobtainable due to: N/A    Context: Noelle Mehta is a 57 y.o. female with past medical history of epilepsy, bipolar disorder, homelessness, and a history of alcohol and substance abuse who presents to the ED c/o overdose.  Patient states \"I want to die\".  Patient states that she has no reason to live, took an overdose of her prescription medicine and attempt to kill herself.  The overdose occurred after getting into a disagreement with another resident at her assisted house.  Patient states that \"I even messed that up\".  Patient's only current complaint is of being tired, nausea, and chest pain.      PAST MEDICAL HISTORY  Active Ambulatory Problems     Diagnosis Date Noted   • No Active Ambulatory Problems     Resolved Ambulatory Problems     Diagnosis Date Noted   • No Resolved Ambulatory Problems     Past Medical History:   Diagnosis Date   • Epilepsy (HCC)    • H/O ETOH abuse    • Seizures (HCC)          PAST SURGICAL HISTORY  Past Surgical History:   Procedure Laterality Date   • BACK SURGERY           FAMILY HISTORY  History reviewed. No pertinent family history.      SOCIAL HISTORY  Social History     Socioeconomic History   • Marital status:    Tobacco Use   • Smoking status: Current Every Day Smoker     Types: Cigarettes   • Smokeless tobacco: Never Used   Substance and Sexual Activity   • Alcohol use: No     Comment: sober since 1/2018   • Drug use: No   • Sexual activity: Defer         ALLERGIES  Codeine, Lactose intolerance (gi), Naltrexone, and Latex        REVIEW OF SYSTEMS  Review of Systems     All systems reviewed and negative except for those discussed in HPI.       PHYSICAL EXAM    I have reviewed the triage vital signs and nursing notes.    ED Triage Vitals [08/21/22 2037]   Temp " Heart Rate Resp BP SpO2   98.8 °F (37.1 °C) 94 18 136/95 98 %      Temp src Heart Rate Source Patient Position BP Location FiO2 (%)   Oral -- -- -- --       Physical Exam  GENERAL: Alert, but lethargic, depressed mood/flat affect   HENT: nares patent  EYES: no scleral icterus  CV: regular rhythm, regular rate  RESPIRATORY: normal effort  ABDOMEN: soft  MUSCULOSKELETAL: no deformity  NEURO: alert, moves all extremities, follows commands  SKIN: warm, dry        LAB RESULTS  Recent Results (from the past 24 hour(s))   ECG 12 Lead    Collection Time: 08/21/22  9:10 PM   Result Value Ref Range    QT Interval 392 ms   Comprehensive Metabolic Panel    Collection Time: 08/21/22  9:53 PM    Specimen: Blood   Result Value Ref Range    Glucose 97 65 - 99 mg/dL    BUN 18 6 - 20 mg/dL    Creatinine 1.05 (H) 0.57 - 1.00 mg/dL    Sodium 141 136 - 145 mmol/L    Potassium 3.9 3.5 - 5.2 mmol/L    Chloride 106 98 - 107 mmol/L    CO2 22.6 22.0 - 29.0 mmol/L    Calcium 8.9 8.6 - 10.5 mg/dL    Total Protein 6.2 6.0 - 8.5 g/dL    Albumin 4.20 3.50 - 5.20 g/dL    ALT (SGPT) 11 1 - 33 U/L    AST (SGOT) 9 1 - 32 U/L    Alkaline Phosphatase 77 39 - 117 U/L    Total Bilirubin <0.2 0.0 - 1.2 mg/dL    Globulin 2.0 gm/dL    A/G Ratio 2.1 g/dL    BUN/Creatinine Ratio 17.1 7.0 - 25.0    Anion Gap 12.4 5.0 - 15.0 mmol/L    eGFR 62.5 >60.0 mL/min/1.73   Protime-INR    Collection Time: 08/21/22  9:53 PM    Specimen: Blood   Result Value Ref Range    Protime 12.8 11.7 - 14.2 Seconds    INR 0.97 0.90 - 1.10   Troponin    Collection Time: 08/21/22  9:53 PM    Specimen: Blood   Result Value Ref Range    Troponin T <0.010 0.000 - 0.030 ng/mL   Ethanol    Collection Time: 08/21/22  9:53 PM    Specimen: Blood   Result Value Ref Range    Ethanol <10 0 - 10 mg/dL    Ethanol % <0.010 %   Acetaminophen Level    Collection Time: 08/21/22  9:53 PM    Specimen: Blood   Result Value Ref Range    Acetaminophen <5.0 0.0 - 30.0 mcg/mL   Salicylate Level    Collection  Time: 08/21/22  9:53 PM    Specimen: Blood   Result Value Ref Range    Salicylate <0.3 <=30.0 mg/dL   CBC Auto Differential    Collection Time: 08/21/22  9:53 PM    Specimen: Blood   Result Value Ref Range    WBC 8.01 3.40 - 10.80 10*3/mm3    RBC 4.14 3.77 - 5.28 10*6/mm3    Hemoglobin 12.8 12.0 - 15.9 g/dL    Hematocrit 38.4 34.0 - 46.6 %    MCV 92.8 79.0 - 97.0 fL    MCH 30.9 26.6 - 33.0 pg    MCHC 33.3 31.5 - 35.7 g/dL    RDW 13.5 12.3 - 15.4 %    RDW-SD 45.3 37.0 - 54.0 fl    MPV 10.1 6.0 - 12.0 fL    Platelets 259 140 - 450 10*3/mm3    Neutrophil % 51.9 42.7 - 76.0 %    Lymphocyte % 36.8 19.6 - 45.3 %    Monocyte % 7.0 5.0 - 12.0 %    Eosinophil % 3.5 0.3 - 6.2 %    Basophil % 0.6 0.0 - 1.5 %    Immature Grans % 0.2 0.0 - 0.5 %    Neutrophils, Absolute 4.15 1.70 - 7.00 10*3/mm3    Lymphocytes, Absolute 2.95 0.70 - 3.10 10*3/mm3    Monocytes, Absolute 0.56 0.10 - 0.90 10*3/mm3    Eosinophils, Absolute 0.28 0.00 - 0.40 10*3/mm3    Basophils, Absolute 0.05 0.00 - 0.20 10*3/mm3    Immature Grans, Absolute 0.02 0.00 - 0.05 10*3/mm3    nRBC 0.0 0.0 - 0.2 /100 WBC   COVID-19,[facility] IN-HOUSE CEPHEID/ELOIAS NP SWAB IN TRANSPORT MEDIA 8-12 HR TAT - Swab, Nasopharynx    Collection Time: 08/21/22  9:55 PM    Specimen: Nasopharynx; Swab   Result Value Ref Range    COVID19 Not Detected Not Detected - Ref. Range   Urinalysis With Microscopic If Indicated (No Culture) - Urine, Clean Catch    Collection Time: 08/21/22 11:52 PM    Specimen: Urine, Clean Catch   Result Value Ref Range    Color, UA Yellow Yellow, Straw    Appearance, UA Clear Clear    pH, UA 6.5 5.0 - 8.0    Specific Gravity, UA 1.007 1.005 - 1.030    Glucose, UA Negative Negative    Ketones, UA Negative Negative    Bilirubin, UA Negative Negative    Blood, UA Negative Negative    Protein, UA Negative Negative    Leuk Esterase, UA Small (1+) (A) Negative    Nitrite, UA Negative Negative    Urobilinogen, UA 0.2 E.U./dL 0.2 - 1.0 E.U./dL   Urine Drug Screen - Urine,  Clean Catch    Collection Time: 08/21/22 11:52 PM    Specimen: Urine, Clean Catch   Result Value Ref Range    Amphet/Methamphet, Screen Negative Negative    Barbiturates Screen, Urine Negative Negative    Benzodiazepine Screen, Urine Negative Negative    Cocaine Screen, Urine Negative Negative    Opiate Screen Negative Negative    THC, Screen, Urine Negative Negative    Methadone Screen, Urine Negative Negative    Oxycodone Screen, Urine Negative Negative   Urinalysis, Microscopic Only - Urine, Clean Catch    Collection Time: 08/21/22 11:52 PM    Specimen: Urine, Clean Catch   Result Value Ref Range    RBC, UA 0-2 None Seen, 0-2 /HPF    WBC, UA 6-12 (A) None Seen, 0-2 /HPF    Bacteria, UA None Seen None Seen /HPF    Squamous Epithelial Cells, UA 0-2 None Seen, 0-2 /HPF    Hyaline Casts, UA None Seen None Seen /LPF    Methodology Automated Microscopy    ECG 12 Lead    Collection Time: 08/22/22 12:28 AM   Result Value Ref Range    QT Interval 413 ms       Ordered the above labs and independently reviewed the results.        RADIOLOGY  XR Chest 1 View    Result Date: 8/21/2022  SINGLE VIEW OF THE CHEST  HISTORY: Overdose. Chest pain.  COMPARISON: 04/17/2021  FINDINGS: Heart size is within normal limits for technique. No pneumothorax, pleural or pleural effusion is seen. There is some nonspecific left basilar consolidation. Lung volumes appear somewhat diminished.      Nonspecific left basilar consolidation. This may represent atelectasis, although infiltrate is not excluded.  This report was finalized on 8/21/2022 9:47 PM by Dr. Idania Babin M.D.        I ordered the above noted radiological studies. Reviewed by me and discussed with radiologist.  See dictation for official radiology interpretation.      PROCEDURES    Procedures      MEDICATIONS GIVEN IN ER    Medications   sodium chloride 0.9 % flush 10 mL (has no administration in time range)   sodium chloride 0.9 % bolus 1,000 mL (0 mL Intravenous Stopped  8/21/22 2338)         PROGRESS, DATA ANALYSIS, CONSULTS, AND MEDICAL DECISION MAKING    All labs have been independently reviewed by me.  All radiology studies have been reviewed by me and discussed with radiologist dictating the report.   EKG's independently viewed and interpreted by me.  Discussion below represents my analysis of pertinent findings related to patient's condition, differential diagnosis, treatment plan and final disposition.    DDx: Includes but not limited to depression, suicidal ideation, intentional overdose    ED Course as of 08/22/22 0558   Sun Aug 21, 2022   2104 Poison control consulted, recommendation is for 6 to 8 hours of observation, charcoal is not indicated.  Monitor for anticholinergic effects, CNS depression, nausea and vomiting.  Recommend to check talk screen, alcohol, and an EKG for possible QT prolongation. [DOMINICK]   2115 EKG          EKG time: 21:10  Rhythm/Rate: Sinus rhythm at 90 bpm  P waves and MO: Normal  QRS, axis: Normal  ST and T waves: No acute ST/T wave changes, normal QT interval    Interpreted Contemporaneously by me, independently viewed  Unchanged compared to prior EKG of 3/5/2022.   [DOMINICK]   2307 COVID19: Not Detected [DOMINICK]   2308 WBC: 8.01 [DOMINICK]   2308 Hemoglobin: 12.8 [DOMINICK]   2308 Hematocrit: 38.4 [DOMINICK]   2308 Platelets: 259 [DOMINICK]   2308 Glucose: 97 [DOMINICK]   2308 BUN: 18 [DOMINICK]   2308 Creatinine(!): 1.05 [DOMINICK]   2308 Sodium: 141 [DOMINICK]   2308 Potassium: 3.9 [DOMINICK]   2308 Chloride: 106 [DOMINICK]   2308 CO2: 22.6 [DOMINICK]   2308 Troponin T: <0.010 [DOMINICK]   2308 Ethanol: <10 [DOMINICK]   2308 Salicylate: <0.3 [DOMINICK]   2308 Acetaminophen: <5.0 [DOMINICK]   Mon Aug 22, 2022   0320 Patient has been observed for over 6 hours and her vital signs remained stable.  Patient is medically cleared and is currently being evaluated by [facility]. [DOMINICK]   0335 Patient has been seen by [facility], they recommend inpatient treatment and will attempt to find placement for the patient. [DOMINICK]   0555 Spoke with the access center  regarding placement, they have been unable to find local placement at this point and I have requested that we place an inpatient consult to Dr. West with psychiatry. [DOMINICK]      ED Course User Index  [DOMINICK] Aly Hui PA         PPE: The patient wore a surgical mask throughout the entire patient encounter. I wore an N95.    AS OF 05:58 EDT VITALS:    BP - 96/63  HR - 75  TEMP - 98.8 °F (37.1 °C) (Oral)  O2 SATS - 97%        DIAGNOSIS  Final diagnoses:   Intentional overdose, initial encounter (HCC)   Suicidal ideation   History of epilepsy         DISPOSITION  Pending psychiatric evaluation and final disposition.           Aly Hui PA  08/22/22 0558

## 2022-08-22 NOTE — CASE MANAGEMENT/SOCIAL WORK
Per Siria Medley - patient accepted for inpatient psych admission    Dr Jordan - accepting doctor    [phone] - to call report    Pt going to [facility]/bed 1    Info given to [facility] and ED clinicians

## 2022-08-22 NOTE — ED PROVIDER NOTES
EMERGENCY DEPARTMENT ENCOUNTER    Room number:  07/07  Date Seen:  8/22/2022  Time of transfer: 0600  PCP:  System, Provider Not In    Summary: Patient is a 57-year-old female who presented to the ED with chief complaint of suicide attempt.  She took an overdose of her Seroquel, Keppra, and Vistaril.  She was observed in the ED until medically cleared.    Laboratory Results:  Recent Results (from the past 24 hour(s))   ECG 12 Lead    Collection Time: 08/21/22  9:10 PM   Result Value Ref Range    QT Interval 392 ms   Comprehensive Metabolic Panel    Collection Time: 08/21/22  9:53 PM    Specimen: Blood   Result Value Ref Range    Glucose 97 65 - 99 mg/dL    BUN 18 6 - 20 mg/dL    Creatinine 1.05 (H) 0.57 - 1.00 mg/dL    Sodium 141 136 - 145 mmol/L    Potassium 3.9 3.5 - 5.2 mmol/L    Chloride 106 98 - 107 mmol/L    CO2 22.6 22.0 - 29.0 mmol/L    Calcium 8.9 8.6 - 10.5 mg/dL    Total Protein 6.2 6.0 - 8.5 g/dL    Albumin 4.20 3.50 - 5.20 g/dL    ALT (SGPT) 11 1 - 33 U/L    AST (SGOT) 9 1 - 32 U/L    Alkaline Phosphatase 77 39 - 117 U/L    Total Bilirubin <0.2 0.0 - 1.2 mg/dL    Globulin 2.0 gm/dL    A/G Ratio 2.1 g/dL    BUN/Creatinine Ratio 17.1 7.0 - 25.0    Anion Gap 12.4 5.0 - 15.0 mmol/L    eGFR 62.5 >60.0 mL/min/1.73   Protime-INR    Collection Time: 08/21/22  9:53 PM    Specimen: Blood   Result Value Ref Range    Protime 12.8 11.7 - 14.2 Seconds    INR 0.97 0.90 - 1.10   Troponin    Collection Time: 08/21/22  9:53 PM    Specimen: Blood   Result Value Ref Range    Troponin T <0.010 0.000 - 0.030 ng/mL   Ethanol    Collection Time: 08/21/22  9:53 PM    Specimen: Blood   Result Value Ref Range    Ethanol <10 0 - 10 mg/dL    Ethanol % <0.010 %   Acetaminophen Level    Collection Time: 08/21/22  9:53 PM    Specimen: Blood   Result Value Ref Range    Acetaminophen <5.0 0.0 - 30.0 mcg/mL   Salicylate Level    Collection Time: 08/21/22  9:53 PM    Specimen: Blood   Result Value Ref Range    Salicylate <0.3 <=30.0 mg/dL    CBC Auto Differential    Collection Time: 08/21/22  9:53 PM    Specimen: Blood   Result Value Ref Range    WBC 8.01 3.40 - 10.80 10*3/mm3    RBC 4.14 3.77 - 5.28 10*6/mm3    Hemoglobin 12.8 12.0 - 15.9 g/dL    Hematocrit 38.4 34.0 - 46.6 %    MCV 92.8 79.0 - 97.0 fL    MCH 30.9 26.6 - 33.0 pg    MCHC 33.3 31.5 - 35.7 g/dL    RDW 13.5 12.3 - 15.4 %    RDW-SD 45.3 37.0 - 54.0 fl    MPV 10.1 6.0 - 12.0 fL    Platelets 259 140 - 450 10*3/mm3    Neutrophil % 51.9 42.7 - 76.0 %    Lymphocyte % 36.8 19.6 - 45.3 %    Monocyte % 7.0 5.0 - 12.0 %    Eosinophil % 3.5 0.3 - 6.2 %    Basophil % 0.6 0.0 - 1.5 %    Immature Grans % 0.2 0.0 - 0.5 %    Neutrophils, Absolute 4.15 1.70 - 7.00 10*3/mm3    Lymphocytes, Absolute 2.95 0.70 - 3.10 10*3/mm3    Monocytes, Absolute 0.56 0.10 - 0.90 10*3/mm3    Eosinophils, Absolute 0.28 0.00 - 0.40 10*3/mm3    Basophils, Absolute 0.05 0.00 - 0.20 10*3/mm3    Immature Grans, Absolute 0.02 0.00 - 0.05 10*3/mm3    nRBC 0.0 0.0 - 0.2 /100 WBC   COVID-19, JOVANNY IN-HOUSE CEPHEID/ELOISA NP SWAB IN TRANSPORT MEDIA 8-12 HR TAT - Swab, Nasopharynx    Collection Time: 08/21/22  9:55 PM    Specimen: Nasopharynx; Swab   Result Value Ref Range    COVID19 Not Detected Not Detected - Ref. Range   Urinalysis With Microscopic If Indicated (No Culture) - Urine, Clean Catch    Collection Time: 08/21/22 11:52 PM    Specimen: Urine, Clean Catch   Result Value Ref Range    Color, UA Yellow Yellow, Straw    Appearance, UA Clear Clear    pH, UA 6.5 5.0 - 8.0    Specific Gravity, UA 1.007 1.005 - 1.030    Glucose, UA Negative Negative    Ketones, UA Negative Negative    Bilirubin, UA Negative Negative    Blood, UA Negative Negative    Protein, UA Negative Negative    Leuk Esterase, UA Small (1+) (A) Negative    Nitrite, UA Negative Negative    Urobilinogen, UA 0.2 E.U./dL 0.2 - 1.0 E.U./dL   Urine Drug Screen - Urine, Clean Catch    Collection Time: 08/21/22 11:52 PM    Specimen: Urine, Clean Catch   Result Value Ref  Range    Amphet/Methamphet, Screen Negative Negative    Barbiturates Screen, Urine Negative Negative    Benzodiazepine Screen, Urine Negative Negative    Cocaine Screen, Urine Negative Negative    Opiate Screen Negative Negative    THC, Screen, Urine Negative Negative    Methadone Screen, Urine Negative Negative    Oxycodone Screen, Urine Negative Negative   Urinalysis, Microscopic Only - Urine, Clean Catch    Collection Time: 08/21/22 11:52 PM    Specimen: Urine, Clean Catch   Result Value Ref Range    RBC, UA 0-2 None Seen, 0-2 /HPF    WBC, UA 6-12 (A) None Seen, 0-2 /HPF    Bacteria, UA None Seen None Seen /HPF    Squamous Epithelial Cells, UA 0-2 None Seen, 0-2 /HPF    Hyaline Casts, UA None Seen None Seen /LPF    Methodology Automated Microscopy    ECG 12 Lead    Collection Time: 08/22/22 12:28 AM   Result Value Ref Range    QT Interval 413 ms     I reviewed the above results.    Radiology:  XR Chest 1 View   Final Result   Nonspecific left basilar consolidation. This may represent atelectasis,   although infiltrate is not excluded.       This report was finalized on 8/21/2022 9:47 PM by Dr. Idania Babin M.D.            I reviewed the above results    Medications ordered in ED:  Medications   sodium chloride 0.9 % flush 10 mL (has no administration in time range)   sodium chloride 0.9 % bolus 1,000 mL (0 mL Intravenous Stopped 8/21/22 6078)       Progress and Consult Notes:  0600:  Patient care transferred from Aly Amaya PA-C pending admission or transfer for psychiatric care.  ED Course as of 08/22/22 1504   Sun Aug 21, 2022   2104 Poison control consulted, recommendation is for 6 to 8 hours of observation, charcoal is not indicated.  Monitor for anticholinergic effects, CNS depression, nausea and vomiting.  Recommend to check talk screen, alcohol, and an EKG for possible QT prolongation. [DOMINICK]   2100 EKG          EKG time: 21:10  Rhythm/Rate: Sinus rhythm at 90 bpm  P waves and WI: Normal  QRS, axis:  Normal  ST and T waves: No acute ST/T wave changes, normal QT interval    Interpreted Contemporaneously by me, independently viewed  Unchanged compared to prior EKG of 3/5/2022.   [DOMINICK]   2307 COVID19: Not Detected [DOMINICK]   2308 WBC: 8.01 [DOMINICK]   2308 Hemoglobin: 12.8 [DOMINICK]   2308 Hematocrit: 38.4 [DOMINICK]   2308 Platelets: 259 [DOMINICK]   2308 Glucose: 97 [DOMINICK]   2308 BUN: 18 [DOMINICK]   2308 Creatinine(!): 1.05 [DOMINICK]   2308 Sodium: 141 [DOMINICK]   2308 Potassium: 3.9 [DOMINICK]   2308 Chloride: 106 [DOMINICK]   2308 CO2: 22.6 [DOMINICK]   2308 Troponin T: <0.010 [DOMINICK]   2308 Ethanol: <10 [DOMINICK]   2308 Salicylate: <0.3 [DOMINICK]   2308 Acetaminophen: <5.0 [DOMINICK]   Mon Aug 22, 2022   0320 Patient has been observed for over 6 hours and her vital signs remained stable.  Patient is medically cleared and is currently being evaluated by [facility]. [DOMINICK]   0335 Patient has been seen by [facility], they recommend inpatient treatment and will attempt to find placement for the patient. [DOMINICK]   0555 Spoke with the access center regarding placement, they have been unable to find local placement at this point and I have requested that we place an inpatient consult to Dr. West with psychiatry. [DOMINICK]   1322 I was informed by charge nurse that Dr. West came and saw the patient and stated she would need to be admitted to an outpatient psych facility.  [facility] is working on this. [AH]   1330 Patient rechecked.  Patient has been resting comfortably with no complaints. [AH]   1452 Received report from [facility] the patient will be transferred to [facility], the accepting physician is Dr. Jordan. [AH]      ED Course User Index  [AH] Alma Barnes PA  [DOMINICK] Aly Hui PA         Diagnosis:  Final diagnoses:   Intentional overdose, initial encounter (HCC)   Suicidal ideation   History of epilepsy         Provider attestation:  I personally reviewed the past medical history, past surgical history, social history, family history, current medications, and allergies as they appear  in the chart.    The patient was seen and examined by myself and Dr. Keller, who agree with plan.          Alma Barnes PA  08/22/22 8597
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
## 2022-08-22 NOTE — CASE MANAGEMENT/SOCIAL WORK
[facility] continuing to look for inpatient psych bed for placement    Patient packet faxed to [facility] - waiting for call back after review
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
## 2022-08-22 NOTE — CASE MANAGEMENT/SOCIAL WORK
Per Bobby with [facility] for patient to be considered for admission a letter of confirmation from [facility] must be submitted on an official letterhead stating patient may return and that transportation will be provided. [facility] will attempt to obtain or continue seeking placement until a bed becomes available. Calls placed to [facility] @ [phone] and [phone]. Unable to reach anyone at this time. Office hours Mon-Fri 9a-5p.

## 2022-08-22 NOTE — PROGRESS NOTES
This clinician spoke with [facility] resident who answered general phone number; she provided contact information for staff member (Dex, [phone]). This writer left voicemail message for Dex; await return call. No further needs/concerns noted at this time.

## 2022-08-22 NOTE — CASE MANAGEMENT/SOCIAL WORK
[facility] Intake seeking inpatient psych bed. Per Wes with [facility] no [facility]. Per Jay with [facility] no [facility]. Per Jenny with [facility] no beds. Per Dex with [facility] referral can be faxed for review.

## 2022-08-22 NOTE — ED PROVIDER NOTES
MD ATTESTATION NOTE    The ALAN and I have discussed this patient's history, physical exam, and treatment plan.    I provided a substantive portion of the care of this patient. I personally performed the physical exam, in its entirety. The attached note describes my personal findings.      Noelle Mehta is a 57 y.o. female who presents to the ED c/o suicide attempt.  States she took an overdose of her prescribed medicine.  Patient took 12 1420 mg Seroquel, an unknown amount of Keppra and Vistaril as well.  States she took the pills around 7:30 PM yesterday evening.      On exam:  GENERAL: not distressed, sleepy  HENT: nares patent  EYES: no scleral icterus  CV: regular rhythm, regular rate  RESPIRATORY: normal effort  ABDOMEN: soft  MUSCULOSKELETAL: no deformity  NEURO: alert, moves all extremities, follows commands  SKIN: warm, dry    Labs  No results found for this or any previous visit (from the past 24 hour(s)).      Radiology  No Radiology Exams Resulted Within Past 24 Hours      Medical Decision Making:  ED Course as of 08/25/22 0627   Sun Aug 21, 2022   2104 Poison control consulted, recommendation is for 6 to 8 hours of observation, charcoal is not indicated.  Monitor for anticholinergic effects, CNS depression, nausea and vomiting.  Recommend to check talk screen, alcohol, and an EKG for possible QT prolongation. [DOMINICK]   2115 EKG          EKG time: 21:10  Rhythm/Rate: Sinus rhythm at 90 bpm  P waves and TN: Normal  QRS, axis: Normal  ST and T waves: No acute ST/T wave changes, normal QT interval    Interpreted Contemporaneously by me, independently viewed  Unchanged compared to prior EKG of 3/5/2022.   [DOMINICK]   2307 COVID19: Not Detected [DOMINICK]   2308 WBC: 8.01 [DOMINICK]   2308 Hemoglobin: 12.8 [DOMINICK]   2308 Hematocrit: 38.4 [DOMINICK]   2308 Platelets: 259 [DOMINICK]   2308 Glucose: 97 [DOMINICK]   2308 BUN: 18 [DOMINICK]   2308 Creatinine(!): 1.05 [DOMINICK]   2308 Sodium: 141 [DOMINICK]   2308 Potassium: 3.9 [DOMINICK]   2308 Chloride: 106 [DOMINICK]   2308 CO2: 22.6  [DOMINICK]   2308 Troponin T: <0.010 [DOMINICK]   2308 Ethanol: <10 [DOMINICK]   2308 Salicylate: <0.3 [DOMINICK]   2308 Acetaminophen: <5.0 [DOMINICK]   Mon Aug 22, 2022   0320 Patient has been observed for over 6 hours and her vital signs remained stable.  Patient is medically cleared and is currently being evaluated by [facility]. [DOMINICK]   0335 Patient has been seen by [facility], they recommend inpatient treatment and will attempt to find placement for the patient. [DOMINICK]   0555 Spoke with the access center regarding placement, they have been unable to find local placement at this point and I have requested that we place an inpatient consult to Dr. West with psychiatry. [DOMINICK]   1322 I was informed by charge nurse that Dr. West came and saw the patient and stated she would need to be admitted to an outpatient psych facility.  [facility] is working on this. [AH]   1330 Patient rechecked.  Patient has been resting comfortably with no complaints. [AH]   1452 Received report from [facility] the patient will be transferred to [facility], the accepting physician is Dr. Jordan. [AH]      ED Course User Index  [AH] Alma Barnes PA  [DOMINICK] Aly Hui PA           PPE: Both the patient and I wore a surgical mask throughout the entire patient encounter. I wore protective goggles.     Diagnosis  Final diagnoses:   Intentional overdose, initial encounter ([facility])   Suicidal ideation   History of epilepsy        Param Keller MD  08/22/22 0713       Param Keller MD  08/25/22 0616

## 2024-11-19 ENCOUNTER — HOSPITAL ENCOUNTER (INPATIENT)
Facility: HOSPITAL | Age: 59
LOS: 2 days | Discharge: ANOTHER HOSPITAL | End: 2024-11-21
Attending: EMERGENCY MEDICINE | Admitting: INTERNAL MEDICINE
Payer: COMMERCIAL

## 2024-11-19 ENCOUNTER — APPOINTMENT (OUTPATIENT)
Dept: GENERAL RADIOLOGY | Facility: HOSPITAL | Age: 59
End: 2024-11-19
Payer: COMMERCIAL

## 2024-11-19 DIAGNOSIS — T50.902A INTENTIONAL DRUG OVERDOSE, INITIAL ENCOUNTER: Primary | ICD-10-CM

## 2024-11-19 DIAGNOSIS — T14.91XA SUICIDE ATTEMPT: ICD-10-CM

## 2024-11-19 LAB
ALBUMIN SERPL-MCNC: 3.5 G/DL (ref 3.5–5.2)
ALBUMIN/GLOB SERPL: 1.3 G/DL
ALP SERPL-CCNC: 67 U/L (ref 39–117)
ALT SERPL W P-5'-P-CCNC: 19 U/L (ref 1–33)
AMPHET+METHAMPHET UR QL: NEGATIVE
ANION GAP SERPL CALCULATED.3IONS-SCNC: 11 MMOL/L (ref 5–15)
ANION GAP SERPL CALCULATED.3IONS-SCNC: 7.3 MMOL/L (ref 5–15)
APAP SERPL-MCNC: <5 MCG/ML (ref 0–30)
AST SERPL-CCNC: 17 U/L (ref 1–32)
BACTERIA UR QL AUTO: ABNORMAL /HPF
BARBITURATES UR QL SCN: NEGATIVE
BASOPHILS # BLD AUTO: 0.03 10*3/MM3 (ref 0–0.2)
BASOPHILS # BLD AUTO: 0.04 10*3/MM3 (ref 0–0.2)
BASOPHILS NFR BLD AUTO: 0.6 % (ref 0–1.5)
BASOPHILS NFR BLD AUTO: 0.7 % (ref 0–1.5)
BENZODIAZ UR QL SCN: POSITIVE
BILIRUB SERPL-MCNC: <0.2 MG/DL (ref 0–1.2)
BILIRUB UR QL STRIP: NEGATIVE
BUN SERPL-MCNC: 10 MG/DL (ref 6–20)
BUN SERPL-MCNC: 11 MG/DL (ref 6–20)
BUN/CREAT SERPL: 12 (ref 7–25)
BUN/CREAT SERPL: 12.7 (ref 7–25)
CALCIUM SPEC-SCNC: 8.5 MG/DL (ref 8.6–10.5)
CALCIUM SPEC-SCNC: 8.8 MG/DL (ref 8.6–10.5)
CANNABINOIDS SERPL QL: NEGATIVE
CHLORIDE SERPL-SCNC: 109 MMOL/L (ref 98–107)
CHLORIDE SERPL-SCNC: 114 MMOL/L (ref 98–107)
CLARITY UR: CLEAR
CO2 SERPL-SCNC: 21 MMOL/L (ref 22–29)
CO2 SERPL-SCNC: 22.7 MMOL/L (ref 22–29)
COCAINE UR QL: NEGATIVE
COLOR UR: YELLOW
CREAT SERPL-MCNC: 0.79 MG/DL (ref 0.57–1)
CREAT SERPL-MCNC: 0.92 MG/DL (ref 0.57–1)
DEPRECATED RDW RBC AUTO: 49.5 FL (ref 37–54)
DEPRECATED RDW RBC AUTO: 50.9 FL (ref 37–54)
EGFRCR SERPLBLD CKD-EPI 2021: 72.3 ML/MIN/1.73
EGFRCR SERPLBLD CKD-EPI 2021: 86.8 ML/MIN/1.73
EOSINOPHIL # BLD AUTO: 0.15 10*3/MM3 (ref 0–0.4)
EOSINOPHIL # BLD AUTO: 0.16 10*3/MM3 (ref 0–0.4)
EOSINOPHIL NFR BLD AUTO: 2.8 % (ref 0.3–6.2)
EOSINOPHIL NFR BLD AUTO: 2.8 % (ref 0.3–6.2)
ERYTHROCYTE [DISTWIDTH] IN BLOOD BY AUTOMATED COUNT: 13.8 % (ref 12.3–15.4)
ERYTHROCYTE [DISTWIDTH] IN BLOOD BY AUTOMATED COUNT: 14.1 % (ref 12.3–15.4)
ETHANOL BLD-MCNC: <10 MG/DL (ref 0–10)
ETHANOL UR QL: <0.01 %
FENTANYL UR-MCNC: NEGATIVE NG/ML
GLOBULIN UR ELPH-MCNC: 2.6 GM/DL
GLUCOSE BLDC GLUCOMTR-MCNC: 102 MG/DL (ref 70–130)
GLUCOSE BLDC GLUCOMTR-MCNC: 103 MG/DL (ref 70–130)
GLUCOSE SERPL-MCNC: 91 MG/DL (ref 65–99)
GLUCOSE SERPL-MCNC: 99 MG/DL (ref 65–99)
GLUCOSE UR STRIP-MCNC: NEGATIVE MG/DL
HCT VFR BLD AUTO: 30.9 % (ref 34–46.6)
HCT VFR BLD AUTO: 31 % (ref 34–46.6)
HGB BLD-MCNC: 10.1 G/DL (ref 12–15.9)
HGB BLD-MCNC: 10.1 G/DL (ref 12–15.9)
HGB UR QL STRIP.AUTO: NEGATIVE
HYALINE CASTS UR QL AUTO: ABNORMAL /LPF
IMM GRANULOCYTES # BLD AUTO: 0.02 10*3/MM3 (ref 0–0.05)
IMM GRANULOCYTES # BLD AUTO: 0.02 10*3/MM3 (ref 0–0.05)
IMM GRANULOCYTES NFR BLD AUTO: 0.3 % (ref 0–0.5)
IMM GRANULOCYTES NFR BLD AUTO: 0.4 % (ref 0–0.5)
INR PPP: 1.08 (ref 0.9–1.1)
KETONES UR QL STRIP: NEGATIVE
LEUKOCYTE ESTERASE UR QL STRIP.AUTO: ABNORMAL
LYMPHOCYTES # BLD AUTO: 2.45 10*3/MM3 (ref 0.7–3.1)
LYMPHOCYTES # BLD AUTO: 2.47 10*3/MM3 (ref 0.7–3.1)
LYMPHOCYTES NFR BLD AUTO: 42.5 % (ref 19.6–45.3)
LYMPHOCYTES NFR BLD AUTO: 46.3 % (ref 19.6–45.3)
MAGNESIUM SERPL-MCNC: 2.2 MG/DL (ref 1.6–2.6)
MAGNESIUM SERPL-MCNC: 2.4 MG/DL (ref 1.6–2.6)
MCH RBC QN AUTO: 32.5 PG (ref 26.6–33)
MCH RBC QN AUTO: 32.9 PG (ref 26.6–33)
MCHC RBC AUTO-ENTMCNC: 32.6 G/DL (ref 31.5–35.7)
MCHC RBC AUTO-ENTMCNC: 32.7 G/DL (ref 31.5–35.7)
MCV RBC AUTO: 100.7 FL (ref 79–97)
MCV RBC AUTO: 99.7 FL (ref 79–97)
METHADONE UR QL SCN: NEGATIVE
MONOCYTES # BLD AUTO: 0.42 10*3/MM3 (ref 0.1–0.9)
MONOCYTES # BLD AUTO: 0.46 10*3/MM3 (ref 0.1–0.9)
MONOCYTES NFR BLD AUTO: 7.3 % (ref 5–12)
MONOCYTES NFR BLD AUTO: 8.6 % (ref 5–12)
NEUTROPHILS NFR BLD AUTO: 2.21 10*3/MM3 (ref 1.7–7)
NEUTROPHILS NFR BLD AUTO: 2.67 10*3/MM3 (ref 1.7–7)
NEUTROPHILS NFR BLD AUTO: 41.3 % (ref 42.7–76)
NEUTROPHILS NFR BLD AUTO: 46.4 % (ref 42.7–76)
NITRITE UR QL STRIP: NEGATIVE
NRBC BLD AUTO-RTO: 0 /100 WBC (ref 0–0.2)
NRBC BLD AUTO-RTO: 0 /100 WBC (ref 0–0.2)
OPIATES UR QL: NEGATIVE
OXYCODONE UR QL SCN: NEGATIVE
PH UR STRIP.AUTO: 8 [PH] (ref 5–8)
PHOSPHATE SERPL-MCNC: 2.5 MG/DL (ref 2.5–4.5)
PHOSPHATE SERPL-MCNC: 3 MG/DL (ref 2.5–4.5)
PLATELET # BLD AUTO: 349 10*3/MM3 (ref 140–450)
PLATELET # BLD AUTO: 362 10*3/MM3 (ref 140–450)
PMV BLD AUTO: 9.3 FL (ref 6–12)
PMV BLD AUTO: 9.5 FL (ref 6–12)
POTASSIUM SERPL-SCNC: 3.3 MMOL/L (ref 3.5–5.2)
POTASSIUM SERPL-SCNC: 3.7 MMOL/L (ref 3.5–5.2)
PROT SERPL-MCNC: 6.1 G/DL (ref 6–8.5)
PROT UR QL STRIP: NEGATIVE
PROTHROMBIN TIME: 14.2 SECONDS (ref 11.7–14.2)
RBC # BLD AUTO: 3.07 10*6/MM3 (ref 3.77–5.28)
RBC # BLD AUTO: 3.11 10*6/MM3 (ref 3.77–5.28)
RBC # UR STRIP: ABNORMAL /HPF
REF LAB TEST METHOD: ABNORMAL
SALICYLATES SERPL-MCNC: <0.3 MG/DL
SODIUM SERPL-SCNC: 141 MMOL/L (ref 136–145)
SODIUM SERPL-SCNC: 144 MMOL/L (ref 136–145)
SP GR UR STRIP: 1.01 (ref 1–1.03)
SQUAMOUS #/AREA URNS HPF: ABNORMAL /HPF
UROBILINOGEN UR QL STRIP: ABNORMAL
WBC # UR STRIP: ABNORMAL /HPF
WBC NRBC COR # BLD AUTO: 5.34 10*3/MM3 (ref 3.4–10.8)
WBC NRBC COR # BLD AUTO: 5.76 10*3/MM3 (ref 3.4–10.8)

## 2024-11-19 PROCEDURE — 80307 DRUG TEST PRSMV CHEM ANLYZR: CPT | Performed by: EMERGENCY MEDICINE

## 2024-11-19 PROCEDURE — 25010000002 POTASSIUM CHLORIDE 10 MEQ/100ML SOLUTION: Performed by: EMERGENCY MEDICINE

## 2024-11-19 PROCEDURE — 81001 URINALYSIS AUTO W/SCOPE: CPT | Performed by: EMERGENCY MEDICINE

## 2024-11-19 PROCEDURE — 83735 ASSAY OF MAGNESIUM: CPT | Performed by: EMERGENCY MEDICINE

## 2024-11-19 PROCEDURE — 80053 COMPREHEN METABOLIC PANEL: CPT | Performed by: EMERGENCY MEDICINE

## 2024-11-19 PROCEDURE — 84100 ASSAY OF PHOSPHORUS: CPT | Performed by: INTERNAL MEDICINE

## 2024-11-19 PROCEDURE — 71045 X-RAY EXAM CHEST 1 VIEW: CPT

## 2024-11-19 PROCEDURE — 93005 ELECTROCARDIOGRAM TRACING: CPT

## 2024-11-19 PROCEDURE — 93005 ELECTROCARDIOGRAM TRACING: CPT | Performed by: EMERGENCY MEDICINE

## 2024-11-19 PROCEDURE — 82077 ASSAY SPEC XCP UR&BREATH IA: CPT | Performed by: EMERGENCY MEDICINE

## 2024-11-19 PROCEDURE — 82948 REAGENT STRIP/BLOOD GLUCOSE: CPT

## 2024-11-19 PROCEDURE — 85610 PROTHROMBIN TIME: CPT | Performed by: EMERGENCY MEDICINE

## 2024-11-19 PROCEDURE — 80179 DRUG ASSAY SALICYLATE: CPT | Performed by: EMERGENCY MEDICINE

## 2024-11-19 PROCEDURE — 83735 ASSAY OF MAGNESIUM: CPT | Performed by: INTERNAL MEDICINE

## 2024-11-19 PROCEDURE — 99291 CRITICAL CARE FIRST HOUR: CPT

## 2024-11-19 PROCEDURE — 25010000002 LEVETRIRACETAM PER 10 MG

## 2024-11-19 PROCEDURE — 85025 COMPLETE CBC W/AUTO DIFF WBC: CPT | Performed by: EMERGENCY MEDICINE

## 2024-11-19 PROCEDURE — 25810000003 SODIUM CHLORIDE 0.9 % SOLUTION: Performed by: EMERGENCY MEDICINE

## 2024-11-19 PROCEDURE — 80143 DRUG ASSAY ACETAMINOPHEN: CPT | Performed by: EMERGENCY MEDICINE

## 2024-11-19 PROCEDURE — 84100 ASSAY OF PHOSPHORUS: CPT

## 2024-11-19 PROCEDURE — 85025 COMPLETE CBC W/AUTO DIFF WBC: CPT | Performed by: INTERNAL MEDICINE

## 2024-11-19 RX ORDER — BISACODYL 10 MG
10 SUPPOSITORY, RECTAL RECTAL DAILY PRN
Status: DISCONTINUED | OUTPATIENT
Start: 2024-11-19 | End: 2024-11-21 | Stop reason: HOSPADM

## 2024-11-19 RX ORDER — BISACODYL 5 MG/1
5 TABLET, DELAYED RELEASE ORAL DAILY PRN
Status: DISCONTINUED | OUTPATIENT
Start: 2024-11-19 | End: 2024-11-21 | Stop reason: HOSPADM

## 2024-11-19 RX ORDER — POLYETHYLENE GLYCOL 3350 17 G/17G
17 POWDER, FOR SOLUTION ORAL DAILY PRN
Status: DISCONTINUED | OUTPATIENT
Start: 2024-11-19 | End: 2024-11-21 | Stop reason: HOSPADM

## 2024-11-19 RX ORDER — POTASSIUM CHLORIDE 7.45 MG/ML
10 INJECTION INTRAVENOUS ONCE
Status: COMPLETED | OUTPATIENT
Start: 2024-11-19 | End: 2024-11-19

## 2024-11-19 RX ORDER — SODIUM CHLORIDE 0.9 % (FLUSH) 0.9 %
10 SYRINGE (ML) INJECTION EVERY 12 HOURS SCHEDULED
Status: DISCONTINUED | OUTPATIENT
Start: 2024-11-19 | End: 2024-11-21 | Stop reason: HOSPADM

## 2024-11-19 RX ORDER — AMOXICILLIN 250 MG
2 CAPSULE ORAL 2 TIMES DAILY
Status: DISCONTINUED | OUTPATIENT
Start: 2024-11-19 | End: 2024-11-21 | Stop reason: HOSPADM

## 2024-11-19 RX ORDER — SODIUM CHLORIDE 0.9 % (FLUSH) 0.9 %
10 SYRINGE (ML) INJECTION AS NEEDED
Status: DISCONTINUED | OUTPATIENT
Start: 2024-11-19 | End: 2024-11-21 | Stop reason: HOSPADM

## 2024-11-19 RX ORDER — LEVETIRACETAM 500 MG/5ML
500 INJECTION, SOLUTION, CONCENTRATE INTRAVENOUS EVERY 12 HOURS SCHEDULED
Status: DISCONTINUED | OUTPATIENT
Start: 2024-11-19 | End: 2024-11-21 | Stop reason: HOSPADM

## 2024-11-19 RX ORDER — NITROGLYCERIN 0.4 MG/1
0.4 TABLET SUBLINGUAL
Status: DISCONTINUED | OUTPATIENT
Start: 2024-11-19 | End: 2024-11-21 | Stop reason: HOSPADM

## 2024-11-19 RX ORDER — LEVETIRACETAM 500 MG/1
500 TABLET ORAL 2 TIMES DAILY
Status: DISCONTINUED | OUTPATIENT
Start: 2024-11-19 | End: 2024-11-19

## 2024-11-19 RX ORDER — SODIUM CHLORIDE 9 MG/ML
40 INJECTION, SOLUTION INTRAVENOUS AS NEEDED
Status: DISCONTINUED | OUTPATIENT
Start: 2024-11-19 | End: 2024-11-21 | Stop reason: HOSPADM

## 2024-11-19 RX ADMIN — POTASSIUM CHLORIDE 10 MEQ: 7.46 INJECTION, SOLUTION INTRAVENOUS at 19:18

## 2024-11-19 RX ADMIN — MUPIROCIN 1 APPLICATION: 20 OINTMENT TOPICAL at 22:31

## 2024-11-19 RX ADMIN — SODIUM CHLORIDE 1000 ML: 9 INJECTION, SOLUTION INTRAVENOUS at 18:47

## 2024-11-19 RX ADMIN — LEVETIRACETAM 500 MG: 100 INJECTION INTRAVENOUS at 22:44

## 2024-11-19 RX ADMIN — Medication 10 ML: at 22:33

## 2024-11-19 NOTE — ED NOTES
Patient arrives via [facility] for intentional drug overdose. Patient took 8 600mg of Seroquel. Patient is an SI. Alert and oriented x4.

## 2024-11-19 NOTE — ED PROVIDER NOTES
EMERGENCY DEPARTMENT ENCOUNTER    Room Number:  I371/1  Date of encounter:  11/19/2024  PCP: System, Provider Not In  Historian: Patient and EMS  Relevant information and history provided by sources other than the patient will be included below and in the ED Course.  Review of pertinent past medical records may also be included in record below and ED Course.    HPI:  Chief Complaint: Intentional drug overdose  A complete HPI/ROS/PMH/PSH/SH/FH are unobtainable due to: This patient difficult to get an accurate history about when she took.  Uncertain of exactly the medicines that she took.  Context: Noelle Mehta is a 59 y.o. female who presents to the ED c/o according to EMS she took an overdose of just Seroquel which included sixteen 300 mg Seroquel doses.  Patient states that she took 3 of her medicine blue packets.  And those medicine blue packets if she took 8 it would add up to 2400 mg of Neurontin 4800 mg of Seroquel and 90 mg of mirtazapine.  She is unaware of when she took the medicines.  She is unaware of how she got here.  She states that she wanted to go to the [place] because she was depressed and fell down and was suicidal.  She states that she took the medicine during the daytime.  And again uncertain of the time.  Denies any alcohol or any other medicines.  As I talk with the patient and press and asking her questions about past history and past suicide attempts she states you have already made your mind up and she refuses to answer any more questions..  She does state that she has lank cancer she has a port in her left chest but has not started chemotherapy yet.        Previous Episodes: History of suicide substance abuse in the past history of bipolar disorder history of intentional drug overdose in the past.  Current Symptoms: Patient is a little drowsy but is answering questions.  Is alert and oriented    MEDICAL HISTORY REVIEWED  When I look at old records I can see that she had an intentional drug  overdose on 11/4/2024 she had suicidal ideation.  She also had an intentional drug overdose on 8/19/2024 I can see that she does have a history of right upper lobe lung cancer.  And I can see she is had when I go back longstanding episodes of suicidal ideation that is intermittent as well as overdoses that go back as far as March 5, 2022.  History of bipolar disorder history of alcohol abuse and substance abuse.  She usually gets all her care at [facility].      PAST MEDICAL HISTORY  Active Ambulatory Problems     Diagnosis Date Noted    No Active Ambulatory Problems     Resolved Ambulatory Problems     Diagnosis Date Noted    No Resolved Ambulatory Problems     Past Medical History:   Diagnosis Date    Epilepsy     H/O ETOH abuse     Seizures          PAST SURGICAL HISTORY  Past Surgical History:   Procedure Laterality Date    BACK SURGERY           FAMILY HISTORY  No family history on file.      SOCIAL HISTORY  Social History     Socioeconomic History    Marital status:    Tobacco Use    Smoking status: Every Day     Types: Cigarettes    Smokeless tobacco: Never   Substance and Sexual Activity    Alcohol use: No     Comment: sober since 1/2018    Drug use: No    Sexual activity: Defer         ALLERGIES  Codeine, Fentanyl, Lactose intolerance (gi), Naltrexone, and Latex        REVIEW OF SYSTEMS  Review of Systems     All systems reviewed and negative except for those discussed in HPI.       PHYSICAL EXAM    I have reviewed the triage vital signs and nursing notes.    ED Triage Vitals   Temp Heart Rate Resp BP SpO2   11/19/24 1746 11/19/24 1744 11/19/24 1744 11/19/24 1744 11/19/24 1744   97.6 °F (36.4 °C) 98 20 109/79 94 %      Temp src Heart Rate Source Patient Position BP Location FiO2 (%)   -- -- -- -- --              GENERAL: Female that appears a little drowsy.  No acute respiratory distress.Vital signs on my initial evaluation patient's heart rate is 90s to low 100s.  Blood pressure is normal.  Oxygen  saturation is 93% on room air.  HENT: nares patent  Head/neck/ face are symmetric without gross deformity, signs of trauma, or swelling  EYES: no scleral icterus, no conjunctival pallor.  NECK: Supple, no meningismus  CV: regular rhythm, regular rate with intact distal pulses.  Patient has a port in her left upper chest.  Nontender to palpation.  Clean dry and intact  RESPIRATORY: normal effort and no respiratory distress.  Clear to auscultation bilaterally  ABDOMEN: soft and nontender.  Morbidly obese.  MUSCULOSKELETAL: no deformity.  Intact distal pulses that are equal strong and symmetric.  No coolness or cyanosis.  NEURO: alert and appropriate, moves all extremities, follows commands.  Patient is alert and oriented x 3.  Answers questions and follows commands.  She is a little forgetful about all the events that happened today.  No focal motor or sensory changes  SKIN: warm, dry    Vital signs and nursing notes reviewed.        LAB RESULTS  Recent Results (from the past 24 hours)   Comprehensive Metabolic Panel    Collection Time: 11/19/24  6:17 PM    Specimen: Blood   Result Value Ref Range    Glucose 91 65 - 99 mg/dL    BUN 11 6 - 20 mg/dL    Creatinine 0.92 0.57 - 1.00 mg/dL    Sodium 141 136 - 145 mmol/L    Potassium 3.3 (L) 3.5 - 5.2 mmol/L    Chloride 109 (H) 98 - 107 mmol/L    CO2 21.0 (L) 22.0 - 29.0 mmol/L    Calcium 8.8 8.6 - 10.5 mg/dL    Total Protein 6.1 6.0 - 8.5 g/dL    Albumin 3.5 3.5 - 5.2 g/dL    ALT (SGPT) 19 1 - 33 U/L    AST (SGOT) 17 1 - 32 U/L    Alkaline Phosphatase 67 39 - 117 U/L    Total Bilirubin <0.2 0.0 - 1.2 mg/dL    Globulin 2.6 gm/dL    A/G Ratio 1.3 g/dL    BUN/Creatinine Ratio 12.0 7.0 - 25.0    Anion Gap 11.0 5.0 - 15.0 mmol/L    eGFR 72.3 >60.0 mL/min/1.73   Ethanol    Collection Time: 11/19/24  6:17 PM    Specimen: Blood   Result Value Ref Range    Ethanol <10 0 - 10 mg/dL    Ethanol % <0.010 %   Salicylate Level    Collection Time: 11/19/24  6:17 PM    Specimen: Blood    Result Value Ref Range    Salicylate <0.3 <=30.0 mg/dL   Acetaminophen Level    Collection Time: 11/19/24  6:17 PM    Specimen: Blood   Result Value Ref Range    Acetaminophen <5.0 0.0 - 30.0 mcg/mL   Protime-INR    Collection Time: 11/19/24  6:17 PM    Specimen: Blood   Result Value Ref Range    Protime 14.2 11.7 - 14.2 Seconds    INR 1.08 0.90 - 1.10   CBC Auto Differential    Collection Time: 11/19/24  6:17 PM    Specimen: Blood   Result Value Ref Range    WBC 5.76 3.40 - 10.80 10*3/mm3    RBC 3.11 (L) 3.77 - 5.28 10*6/mm3    Hemoglobin 10.1 (L) 12.0 - 15.9 g/dL    Hematocrit 31.0 (L) 34.0 - 46.6 %    MCV 99.7 (H) 79.0 - 97.0 fL    MCH 32.5 26.6 - 33.0 pg    MCHC 32.6 31.5 - 35.7 g/dL    RDW 13.8 12.3 - 15.4 %    RDW-SD 49.5 37.0 - 54.0 fl    MPV 9.5 6.0 - 12.0 fL    Platelets 362 140 - 450 10*3/mm3    Neutrophil % 46.4 42.7 - 76.0 %    Lymphocyte % 42.5 19.6 - 45.3 %    Monocyte % 7.3 5.0 - 12.0 %    Eosinophil % 2.8 0.3 - 6.2 %    Basophil % 0.7 0.0 - 1.5 %    Immature Grans % 0.3 0.0 - 0.5 %    Neutrophils, Absolute 2.67 1.70 - 7.00 10*3/mm3    Lymphocytes, Absolute 2.45 0.70 - 3.10 10*3/mm3    Monocytes, Absolute 0.42 0.10 - 0.90 10*3/mm3    Eosinophils, Absolute 0.16 0.00 - 0.40 10*3/mm3    Basophils, Absolute 0.04 0.00 - 0.20 10*3/mm3    Immature Grans, Absolute 0.02 0.00 - 0.05 10*3/mm3    nRBC 0.0 0.0 - 0.2 /100 WBC   Magnesium    Collection Time: 11/19/24  6:17 PM    Specimen: Blood   Result Value Ref Range    Magnesium 2.2 1.6 - 2.6 mg/dL   Phosphorus    Collection Time: 11/19/24  6:17 PM    Specimen: Blood   Result Value Ref Range    Phosphorus 2.5 2.5 - 4.5 mg/dL   ECG 12 Lead Other; od    Collection Time: 11/19/24  6:24 PM   Result Value Ref Range    QT Interval 374 ms    QTC Interval 480 ms   Urine Drug Screen - Urine, Clean Catch    Collection Time: 11/19/24  7:05 PM    Specimen: Urine, Clean Catch   Result Value Ref Range    Amphet/Methamphet, Screen Negative Negative    Barbiturates Screen,  Urine Negative Negative    Benzodiazepine Screen, Urine Positive (A) Negative    Cocaine Screen, Urine Negative Negative    Opiate Screen Negative Negative    THC, Screen, Urine Negative Negative    Methadone Screen, Urine Negative Negative    Oxycodone Screen, Urine Negative Negative    Fentanyl, Urine Negative Negative   Urinalysis With Microscopic If Indicated (No Culture) - Urine, Clean Catch    Collection Time: 11/19/24  7:05 PM    Specimen: Urine, Clean Catch   Result Value Ref Range    Color, UA Yellow Yellow, Straw    Appearance, UA Clear Clear    pH, UA 8.0 5.0 - 8.0    Specific Gravity, UA 1.010 1.005 - 1.030    Glucose, UA Negative Negative    Ketones, UA Negative Negative    Bilirubin, UA Negative Negative    Blood, UA Negative Negative    Protein, UA Negative Negative    Leuk Esterase, UA Trace (A) Negative    Nitrite, UA Negative Negative    Urobilinogen, UA 0.2 E.U./dL 0.2 - 1.0 E.U./dL   Urinalysis, Microscopic Only - Urine, Clean Catch    Collection Time: 11/19/24  7:05 PM    Specimen: Urine, Clean Catch   Result Value Ref Range    RBC, UA 0-2 None Seen, 0-2 /HPF    WBC, UA 3-5 (A) None Seen, 0-2 /HPF    Bacteria, UA None Seen None Seen /HPF    Squamous Epithelial Cells, UA 0-2 None Seen, 0-2 /HPF    Hyaline Casts, UA None Seen None Seen /LPF    Methodology Automated Microscopy    POC Glucose Once    Collection Time: 11/19/24  9:00 PM    Specimen: Blood   Result Value Ref Range    Glucose 102 70 - 130 mg/dL   Basic Metabolic Panel    Collection Time: 11/19/24  9:11 PM    Specimen: Blood   Result Value Ref Range    Glucose 99 65 - 99 mg/dL    BUN 10 6 - 20 mg/dL    Creatinine 0.79 0.57 - 1.00 mg/dL    Sodium 144 136 - 145 mmol/L    Potassium 3.7 3.5 - 5.2 mmol/L    Chloride 114 (H) 98 - 107 mmol/L    CO2 22.7 22.0 - 29.0 mmol/L    Calcium 8.5 (L) 8.6 - 10.5 mg/dL    BUN/Creatinine Ratio 12.7 7.0 - 25.0    Anion Gap 7.3 5.0 - 15.0 mmol/L    eGFR 86.8 >60.0 mL/min/1.73   Magnesium    Collection Time:  11/19/24  9:11 PM    Specimen: Blood   Result Value Ref Range    Magnesium 2.4 1.6 - 2.6 mg/dL   CBC Auto Differential    Collection Time: 11/19/24  9:11 PM    Specimen: Blood   Result Value Ref Range    WBC 5.34 3.40 - 10.80 10*3/mm3    RBC 3.07 (L) 3.77 - 5.28 10*6/mm3    Hemoglobin 10.1 (L) 12.0 - 15.9 g/dL    Hematocrit 30.9 (L) 34.0 - 46.6 %    .7 (H) 79.0 - 97.0 fL    MCH 32.9 26.6 - 33.0 pg    MCHC 32.7 31.5 - 35.7 g/dL    RDW 14.1 12.3 - 15.4 %    RDW-SD 50.9 37.0 - 54.0 fl    MPV 9.3 6.0 - 12.0 fL    Platelets 349 140 - 450 10*3/mm3    Neutrophil % 41.3 (L) 42.7 - 76.0 %    Lymphocyte % 46.3 (H) 19.6 - 45.3 %    Monocyte % 8.6 5.0 - 12.0 %    Eosinophil % 2.8 0.3 - 6.2 %    Basophil % 0.6 0.0 - 1.5 %    Immature Grans % 0.4 0.0 - 0.5 %    Neutrophils, Absolute 2.21 1.70 - 7.00 10*3/mm3    Lymphocytes, Absolute 2.47 0.70 - 3.10 10*3/mm3    Monocytes, Absolute 0.46 0.10 - 0.90 10*3/mm3    Eosinophils, Absolute 0.15 0.00 - 0.40 10*3/mm3    Basophils, Absolute 0.03 0.00 - 0.20 10*3/mm3    Immature Grans, Absolute 0.02 0.00 - 0.05 10*3/mm3    nRBC 0.0 0.0 - 0.2 /100 WBC   ECG 12 Lead QT Measurement    Collection Time: 11/19/24  9:24 PM   Result Value Ref Range    QT Interval 392 ms    QTC Interval 498 ms       Ordered the above labs and independently reviewed the results.        RADIOLOGY  XR Chest 1 View    Result Date: 11/19/2024  AP CHEST  HISTORY: Overdose  COMPARISON: 8/21/2022  FINDINGS: There are bands of atelectasis or scarring in the left lung base. Right lung is clear. Emphysematous changes are present. Heart size normal. There is a left chest port present with tip in the region of the cavoatrial junction. No appreciable pneumothorax. Lower cervical spine postop changes      Bands of atelectasis or scarring in the left lung base    This report was finalized on 11/19/2024 7:37 PM by Dr. Richard Maciel M.D on Workstation: [id number]       I ordered the above noted radiological studies.  Reviewed by me and discussed with radiologist.  See dictation for official radiology interpretation.      PROCEDURES    Critical Care    Performed by: Canelo Rodriguez MD  Authorized by: Canelo Rodriguez MD    Critical care provider statement:     Critical care time (minutes): 45.    Critical care time was exclusive of:  Separately billable procedures and treating other patients    Critical care was necessary to treat or prevent imminent or life-threatening deterioration of the following conditions:  Toxidrome (Polypharmacy overdose)    Critical care was time spent personally by me on the following activities:  Blood draw for specimens, development of treatment plan with patient or surrogate, discussions with consultants, evaluation of patient's response to treatment, examination of patient, obtaining history from patient or surrogate, review of old charts, re-evaluation of patient's condition, pulse oximetry, ordering and review of radiographic studies, ordering and review of laboratory studies and ordering and performing treatments and interventions    I assumed direction of critical care for this patient from another provider in my specialty: no      Care discussed with: admitting provider          MEDICATIONS GIVEN IN ER    Medications   sodium chloride 0.9 % flush 10 mL (has no administration in time range)   nitroglycerin (NITROSTAT) SL tablet 0.4 mg (has no administration in time range)   sodium chloride 0.9 % flush 10 mL (has no administration in time range)   sodium chloride 0.9 % flush 10 mL (has no administration in time range)   sodium chloride 0.9 % infusion 40 mL (has no administration in time range)   mupirocin (BACTROBAN) 2 % nasal ointment 1 Application (has no administration in time range)   sennosides-docusate (PERICOLACE) 8.6-50 MG per tablet 2 tablet (has no administration in time range)     And   polyethylene glycol (MIRALAX) packet 17 g (has no administration in time range)     And   bisacodyl  (DULCOLAX) EC tablet 5 mg (has no administration in time range)     And   bisacodyl (DULCOLAX) suppository 10 mg (has no administration in time range)   Potassium Replacement - Follow Nurse / BPA Driven Protocol (has no administration in time range)   Magnesium Standard Dose Replacement - Follow Nurse / BPA Driven Protocol (has no administration in time range)   Phosphorus Replacement - Follow Nurse / BPA Driven Protocol (has no administration in time range)   Calcium Replacement - Follow Nurse / BPA Driven Protocol (has no administration in time range)   levETIRAcetam (KEPPRA) tablet 500 mg (has no administration in time range)   sodium chloride 0.9 % bolus 1,000 mL (0 mL Intravenous Stopped 11/19/24 2025)   potassium chloride 10 mEq in 100 mL IVPB (0 mEq Intravenous Stopped 11/19/24 2025)         All labs have been independently reviewed by me.  All radiology studies have been reviewed by me and I discussed with radiologist dictating the report when indicated below.  All EKG's independently viewed and interpreted by me.  Discussion below represents my analysis of pertinent findings related to patient's condition, differential diagnosis, treatment plan and final disposition.        PROGRESS, DATA ANALYSIS, CONSULTS, AND MEDICAL DECISION MAKING    This is a patient that took an overdose of multiple medicines.  The main medicine that will likely be a factor would be Seroquel.  I did consult with poison control.  Will get a monitor closely.  It is basically supportive care.  There is no antidote.  This patient at the same time as I ask more inquisitive questions she states that you have already made your mind up and will not answer any more questions.  She is aware that she came by ambulance unaware of how she got here.  Will get a try to communicate with spouse or family to try to get a better history about all the events that happened prior to arrival here.  She will obviously need to be admitted to the  [facility].      ED Course as of 11/19/24 2230 Tue Nov 19, 2024 1829 I talked with Forrest in poison center.  Again we do not know when she took this medicine.  It goes dark about 530.  She states that it was a light when she took the medicine.  I went through the dosing of the medicine that she took.  She is increased with risk for respiratory depression, anticholinergic side effects seizures and prolongation of QTc.  She took a significant dose of Seroquel.  She might need to be intubated to protect her airway.  She would also have CNS depression associated with this.  She states most of the effects from Seroquel usually occur within the first few hours especially with the first 1 to 2 hours if it is not extended release.  Does not appear that the patient's Seroquel that she consumed is extended release.  But sometimes absorption could be erratic.  She recommends the patient be monitored very closely and treat supportively.  Benzodiazepines if she has seizures, correct electrolytes specifically magnesium potassium and calcium if they are abnormal and IV fluids. [MM]   1832 My own independent or potation of the EKG that was done at 6:24 PM reveals a rate of 99 it is sinus rhythm it is narrow complex normal axis and appreciate any acute injury pattern I see some nonspecific T wave changes QT at this time does not appear to be prolonged. [MM]   1838 On reevaluation of the patient right at this time overall her status is remained unchanged.  Heart rate is 90s to low 100s blood pressure is normal.  We do have her on 2 L of oxygen her oxygen saturation is 95 to 96%.  She tells me that she is not  but Taye is her ex-.  She states that jessenia Benites is her daughter.  She states that Taye was not there.  Organ to try to communicate again with Taye to see what he knows. [MM]   1842 I was able to obtain an old EKG from 8/22/2022 and the EKGs look very similar QTs also looks similar. [MM]   1906 I have just  reevaluated this patient again.  Heart rate is in the low 100s.  Blood pressure is normal.  Oxygen saturation 96% on 2 L.  Overall her neurologic status and respiratory status is remained the same.  She is alert and oriented.  She is complaining of a dry mouth.  No change from initial presentation. [MM]   1906 Magnesium: 2.2 [MM]   1906 Salicylate: <0.3 [MM]   1906 Ethanol %: <0.010 [MM]   1906 Acetaminophen: <5.0 [MM]   1906 Potassium(!): 3.3 [MM]   1907 Calcium: 8.8 [MM]   1907 Potassium(!): 3.3  I have ordered 10 mill equivalents of KCl IV. [MM]   1907 Hemoglobin(!): 10.1  Chronic anemia no significant change. [MM]   1946 The nurse got in touch with her ex  Gregory.  He states I do not want anything to do with her I do not know anything that happened to her.  He cannot provide any helpful information. [MM]   1946 I reevaluated the patient again.  Heart rate is [MM]   1947  in the low 100s blood pressure is normal O2 sat is 95% on 2 L.  She seems a little more tired than initial presentation but no significant change. [MM]   2014 I did discuss the case with Kinza who is the midlevel provider in the ICU.  Dr. Somers is the attending.  Informed her of the patient's presenting symptoms and the history that I obtained.  Informed her of the limitations of the exact history of what happened.  Also informed of the conversation I had with the poison center.  She agrees to admit.  All questions answered [MM]   2015 On my reevaluation patient's heart rate is still in the low 100s blood pressure is normal oxygen saturation 96% on 2 L.  She seems a little more tired than my initial evaluation.  But she is alert and oriented x 3 she is answering questions.  She again appears a little slow.  I would think if she did not take long-acting Seroquel we would see more symptoms if she took the amount that she mention. [MM]   2032 Per the nurses evaluation she seemed more tired and sleepy and I went and just reevaluated the  patient again now.  Patient is more sleepy.  Her speech is a little more slurred.  But she opens her eyes in response to voice.  She tells me her name she has more slurred speech than on presentation.  She follows commands and does move her arms and legs.  Vital signs have remained unchanged.  She definitely does not need any airway intervention at this time.  She is protecting her airway again responds to voice and follows simple commands. [MM]      ED Course User Index  [MM] Canelo Rodriguez MD       AS OF 22:30 EST VITALS:    BP - 129/91  HR - 98  TEMP - 97.6 °F (36.4 °C) (Oral)  02 SATS - 98%    SOCIAL DETERMINANTS OF HEALTH THAT IMPACT OR LIMIT CARE (For example..Homelessness,safe discharge, inability to obtain care, follow up, or prescriptions):      DIAGNOSIS  Final diagnoses:   Intentional drug overdose, initial encounter: Polypharmacy overdose including Seroquel, Remeron, Neurontin   Suicide attempt         DISPOSITION  Patient will be admitted to the ICU           DICTATED UTILIZING DRAGON DICTATION    Note Disclaimer: At [facility], we believe that sharing information builds trust and better relationships. You are receiving this note because you recently visited [facility]. It is possible you will see health information before a provider has talked with you about it. This kind of information can be easy to misunderstand. To help you fully understand what it means for your health, we urge you to discuss this note with your provider.       Canelo Rodriguez MD  11/19/24 3691

## 2024-11-20 LAB
ANION GAP SERPL CALCULATED.3IONS-SCNC: 8 MMOL/L (ref 5–15)
BASOPHILS # BLD AUTO: 0.03 10*3/MM3 (ref 0–0.2)
BASOPHILS NFR BLD AUTO: 0.8 % (ref 0–1.5)
BUN SERPL-MCNC: 10 MG/DL (ref 6–20)
BUN/CREAT SERPL: 13 (ref 7–25)
CALCIUM SPEC-SCNC: 7.6 MG/DL (ref 8.6–10.5)
CHLORIDE SERPL-SCNC: 116 MMOL/L (ref 98–107)
CO2 SERPL-SCNC: 21 MMOL/L (ref 22–29)
CREAT SERPL-MCNC: 0.77 MG/DL (ref 0.57–1)
DEPRECATED RDW RBC AUTO: 48.8 FL (ref 37–54)
EGFRCR SERPLBLD CKD-EPI 2021: 89.5 ML/MIN/1.73
EOSINOPHIL # BLD AUTO: 0.17 10*3/MM3 (ref 0–0.4)
EOSINOPHIL NFR BLD AUTO: 4.3 % (ref 0.3–6.2)
ERYTHROCYTE [DISTWIDTH] IN BLOOD BY AUTOMATED COUNT: 13.5 % (ref 12.3–15.4)
GLUCOSE BLDC GLUCOMTR-MCNC: 113 MG/DL (ref 70–130)
GLUCOSE BLDC GLUCOMTR-MCNC: 86 MG/DL (ref 70–130)
GLUCOSE BLDC GLUCOMTR-MCNC: 90 MG/DL (ref 70–130)
GLUCOSE BLDC GLUCOMTR-MCNC: 91 MG/DL (ref 70–130)
GLUCOSE SERPL-MCNC: 84 MG/DL (ref 65–99)
HCT VFR BLD AUTO: 28.2 % (ref 34–46.6)
HGB BLD-MCNC: 9.2 G/DL (ref 12–15.9)
IMM GRANULOCYTES # BLD AUTO: 0.02 10*3/MM3 (ref 0–0.05)
IMM GRANULOCYTES NFR BLD AUTO: 0.5 % (ref 0–0.5)
LYMPHOCYTES # BLD AUTO: 2.02 10*3/MM3 (ref 0.7–3.1)
LYMPHOCYTES NFR BLD AUTO: 51.5 % (ref 19.6–45.3)
MAGNESIUM SERPL-MCNC: 2.1 MG/DL (ref 1.6–2.6)
MCH RBC QN AUTO: 32.5 PG (ref 26.6–33)
MCHC RBC AUTO-ENTMCNC: 32.6 G/DL (ref 31.5–35.7)
MCV RBC AUTO: 99.6 FL (ref 79–97)
MONOCYTES # BLD AUTO: 0.38 10*3/MM3 (ref 0.1–0.9)
MONOCYTES NFR BLD AUTO: 9.7 % (ref 5–12)
NEUTROPHILS NFR BLD AUTO: 1.3 10*3/MM3 (ref 1.7–7)
NEUTROPHILS NFR BLD AUTO: 33.2 % (ref 42.7–76)
NRBC BLD AUTO-RTO: 0 /100 WBC (ref 0–0.2)
PHOSPHATE SERPL-MCNC: 3 MG/DL (ref 2.5–4.5)
PLATELET # BLD AUTO: 292 10*3/MM3 (ref 140–450)
PMV BLD AUTO: 9.4 FL (ref 6–12)
POTASSIUM SERPL-SCNC: 3.1 MMOL/L (ref 3.5–5.2)
POTASSIUM SERPL-SCNC: 3.9 MMOL/L (ref 3.5–5.2)
RBC # BLD AUTO: 2.83 10*6/MM3 (ref 3.77–5.28)
SODIUM SERPL-SCNC: 145 MMOL/L (ref 136–145)
WBC NRBC COR # BLD AUTO: 3.92 10*3/MM3 (ref 3.4–10.8)

## 2024-11-20 PROCEDURE — 84100 ASSAY OF PHOSPHORUS: CPT

## 2024-11-20 PROCEDURE — 85025 COMPLETE CBC W/AUTO DIFF WBC: CPT

## 2024-11-20 PROCEDURE — 82948 REAGENT STRIP/BLOOD GLUCOSE: CPT

## 2024-11-20 PROCEDURE — 93005 ELECTROCARDIOGRAM TRACING: CPT

## 2024-11-20 PROCEDURE — 83735 ASSAY OF MAGNESIUM: CPT

## 2024-11-20 PROCEDURE — 84132 ASSAY OF SERUM POTASSIUM: CPT

## 2024-11-20 PROCEDURE — 80048 BASIC METABOLIC PNL TOTAL CA: CPT

## 2024-11-20 PROCEDURE — 25010000002 POTASSIUM CHLORIDE 10 MEQ/100ML SOLUTION

## 2024-11-20 PROCEDURE — 25010000002 LEVETRIRACETAM PER 10 MG

## 2024-11-20 RX ORDER — OMEPRAZOLE 40 MG/1
40 CAPSULE, DELAYED RELEASE ORAL DAILY
COMMUNITY
Start: 2024-06-17

## 2024-11-20 RX ORDER — ERGOCALCIFEROL 1.25 MG/1
50000 CAPSULE, LIQUID FILLED ORAL
COMMUNITY
Start: 2024-10-29 | End: 2025-01-27

## 2024-11-20 RX ORDER — MIRTAZAPINE 15 MG/1
1 TABLET, FILM COATED ORAL
COMMUNITY
End: 2024-11-21 | Stop reason: HOSPADM

## 2024-11-20 RX ORDER — ALBUTEROL SULFATE 90 UG/1
2 INHALANT RESPIRATORY (INHALATION) EVERY 4 HOURS PRN
COMMUNITY
Start: 2024-11-18 | End: 2024-11-21 | Stop reason: HOSPADM

## 2024-11-20 RX ORDER — FOLIC ACID 1 MG/1
1 TABLET ORAL DAILY
COMMUNITY

## 2024-11-20 RX ORDER — POTASSIUM CHLORIDE 7.45 MG/ML
10 INJECTION INTRAVENOUS
Status: COMPLETED | OUTPATIENT
Start: 2024-11-20 | End: 2024-11-20

## 2024-11-20 RX ORDER — QUETIAPINE FUMARATE 300 MG/1
2 TABLET, FILM COATED ORAL
COMMUNITY
End: 2024-11-21 | Stop reason: HOSPADM

## 2024-11-20 RX ORDER — HYDROXYZINE PAMOATE 50 MG/1
50 CAPSULE ORAL 2 TIMES DAILY PRN
COMMUNITY
End: 2024-11-21 | Stop reason: HOSPADM

## 2024-11-20 RX ORDER — ATOMOXETINE 60 MG/1
60 CAPSULE ORAL DAILY
COMMUNITY
Start: 2024-08-12 | End: 2024-11-21 | Stop reason: HOSPADM

## 2024-11-20 RX ORDER — LEVETIRACETAM 1000 MG/1
1 TABLET ORAL 2 TIMES DAILY
COMMUNITY
Start: 2024-09-03

## 2024-11-20 RX ORDER — GABAPENTIN 300 MG/1
300 CAPSULE ORAL 3 TIMES DAILY
COMMUNITY
Start: 2024-08-29 | End: 2024-11-21 | Stop reason: HOSPADM

## 2024-11-20 RX ORDER — NICOTINE 21 MG/24HR
1 PATCH, TRANSDERMAL 24 HOURS TRANSDERMAL EVERY 24 HOURS
COMMUNITY
Start: 2024-09-30

## 2024-11-20 RX ADMIN — POTASSIUM CHLORIDE 10 MEQ: 7.46 INJECTION, SOLUTION INTRAVENOUS at 09:37

## 2024-11-20 RX ADMIN — Medication 10 ML: at 08:12

## 2024-11-20 RX ADMIN — POTASSIUM CHLORIDE 10 MEQ: 7.46 INJECTION, SOLUTION INTRAVENOUS at 08:15

## 2024-11-20 RX ADMIN — LEVETIRACETAM 500 MG: 100 INJECTION INTRAVENOUS at 08:15

## 2024-11-20 RX ADMIN — POTASSIUM CHLORIDE 10 MEQ: 7.46 INJECTION, SOLUTION INTRAVENOUS at 07:51

## 2024-11-20 RX ADMIN — MUPIROCIN 1 APPLICATION: 20 OINTMENT TOPICAL at 20:18

## 2024-11-20 RX ADMIN — LEVETIRACETAM 500 MG: 100 INJECTION INTRAVENOUS at 20:17

## 2024-11-20 RX ADMIN — MUPIROCIN 1 APPLICATION: 20 OINTMENT TOPICAL at 08:15

## 2024-11-20 RX ADMIN — POTASSIUM CHLORIDE 10 MEQ: 7.46 INJECTION, SOLUTION INTRAVENOUS at 13:16

## 2024-11-20 RX ADMIN — POTASSIUM CHLORIDE 10 MEQ: 7.46 INJECTION, SOLUTION INTRAVENOUS at 05:18

## 2024-11-20 RX ADMIN — Medication 10 ML: at 20:22

## 2024-11-20 RX ADMIN — POTASSIUM CHLORIDE 10 MEQ: 7.46 INJECTION, SOLUTION INTRAVENOUS at 06:12

## 2024-11-20 NOTE — CONSULTS
"IDENTIFYING INFORMATION: The patient is a 59-year-old white female admitted to the intensive outpatient program following an intentional overdose of Seroquel    CHIEF COMPLAINT: None given    INFORMANT: Patient and chart    RELIABILITY: Limited    HISTORY OF PRESENT ILLNESS: The patient is a 59-year-old white female admitted following an intentional ingestion of Seroquel.  This is apparently her third suicide attempt in 3 months.  Additionally, the patient was seen twice in 2022 at this facility by this physician after similar such episodes.  The patient has a history of alcohol abuse but her drug screen was negative for alcohol on admission it was positive for benzodiazepines.  The patient carries a diagnosis of alcohol use disorder, bipolar disorder unspecified and certainly suffers from borderline personality disorder the patient was just discharged from [facility] on 11/13/2022.  The patient reports that she had an altercation with her daughter regarding use of his cell phone and she stated that she wished to go to the [facility] to get \"the treatment that I need\".  She has also been hospitalized at the past at [facility].  The patient reports that after her daughter refused to allow her to use her cell phone she took an overdose of Seroquel and some and EMS to her home.  She clearly intended to self rescue.  When seen today the patient is reporting no active suicidal ideation and admits to the manipulative nature of her act, but continues to demand that she needs \"help\" and is insisting on admission to the [facility].    PAST PSYCHIATRIC HISTORY: Significant for multiple previous suicide attempts, alcohol dependence and a reported history of bipolar disorder.  Her current psychiatric medications include Prozac Seroquel and Vistaril     PAST MEDICAL HISTORY: Significant for history of lung adenocarcinoma and epilepsy    MEDICATIONS:   Current Facility-Administered Medications   Medication " Dose Route Frequency Provider Last Rate Last Admin    sennosides-docusate (PERICOLACE) 8.6-50 MG per tablet 2 tablet  2 tablet Oral BID Liz Perales APRN        And    polyethylene glycol (MIRALAX) packet 17 g  17 g Oral Daily PRN Liz Perales APRN        And    bisacodyl (DULCOLAX) EC tablet 5 mg  5 mg Oral Daily PRN Liz Perales APRN        And    bisacodyl (DULCOLAX) suppository 10 mg  10 mg Rectal Daily PRN Liz Perales APRN        Calcium Replacement - Follow Nurse / BPA Driven Protocol   Not Applicable PRN Liz Perales APRN        levETIRAcetam (KEPPRA) injection 500 mg  500 mg Intravenous Q12H Liz Perales APRN   500 mg at 11/20/24 0815    Magnesium Standard Dose Replacement - Follow Nurse / BPA Driven Protocol   Not Applicable PRN Liz Perales APRN        mupirocin (BACTROBAN) 2 % nasal ointment 1 Application  1 Application Each Nare BID Liz Perales APRN   1 Application at 11/20/24 0815    nitroglycerin (NITROSTAT) SL tablet 0.4 mg  0.4 mg Sublingual Q5 Min PRN Liz Perales APRN        Phosphorus Replacement - Follow Nurse / BPA Driven Protocol   Not Applicable PRLiz Brush APRN        Potassium Replacement - Follow Nurse / BPA Driven Protocol   Not Applicable PRN Liz Perales APRN        sodium chloride 0.9 % flush 10 mL  10 mL Intravenous PRN Canelo Rodriguez MD        sodium chloride 0.9 % flush 10 mL  10 mL Intravenous Q12H Liz Perales APRN   10 mL at 11/20/24 0812    sodium chloride 0.9 % flush 10 mL  10 mL Intravenous PRN Liz Perales APRN        sodium chloride 0.9 % infusion 40 mL  40 mL Intravenous PRN Liz Perales APRN             ALLERGIES: Codeine, fentanyl, naltrexone, latex    FAMILY HISTORY: Noncontributory    SOCIAL HISTORY: Patient is a former alcoholic.  She reports that she has been sober since 2016, though when seen in 2022 she residing in a sober living facility    MENTAL STATUS EXAM: The patient is an obese white female  "appearing her stated age.  Of note is bizarrely dyed pink hair.  She is awake alert and oriented ulcers.  Her mood is irritable her affect congruent.  Speech is generally relevant and coherent.  There are no deficits memory or cognition noted.  Intelligence is judged to be in the low average range based on fund of knowledge, the patient is less than optimally cooperative with interview.  She is ambivalent regarding suicidal ideations at this time but does admit to the metabolic nature of her recent attempt.  She denies any psychotic symptoms.  Her judgment and sight appear to be significantly impaired.    ASSETS/LIABILITIES: To be assessed    DIAGNOSTIC IMPRESSION: Dysthymic disorder, adjustment disorder with depressed mood, borderline personality disorder (primary diagnosis), alcohol use disorder, seizure disorder by history, status post polypharmacy ingestion    PLAN: I believe the circumstances of the patient's admission leaves with no choice but to seek an inpatient psychiatric bed given her multiple recent suicide attempts.  The patient is notably vague when queried as to what sort of \"help\" she seeks\". in the meantime I would recommend continuation of her sitter and suicide precautions and will not restart previously prescribed medications given her recent overdose.  "

## 2024-11-20 NOTE — ED NOTES
Nursing report ED to floor  Noelle Mehta  59 y.o.  female    HPI :  HPI  Stated Reason for Visit: suicide attempt, psych eval, drug overdose  History Obtained From: EMS    Chief Complaint  Chief Complaint   Patient presents with    Suicide Attempt    Drug Overdose    Psychiatric Evaluation       Admitting doctor:   Lauren Somers MD    Admitting diagnosis:   The primary encounter diagnosis was Intentional drug overdose, initial encounter: Polypharmacy overdose including Seroquel, Remeron, Neurontin. A diagnosis of Suicide attempt was also pertinent to this visit.    Code status:   Current Code Status       Date Active Code Status Order ID Comments User Context       Not on file            Allergies:   Codeine, Fentanyl, Lactose intolerance (gi), Naltrexone, and Latex    Isolation:   No active isolations    Intake and Output    Intake/Output Summary (Last 24 hours) at 11/19/2024 2022  Last data filed at 11/19/2024 1914  Gross per 24 hour   Intake --   Output 500 ml   Net -500 ml       Weight:       11/19/24 1744   Weight: 104 kg (229 lb 4.5 oz)       Most recent vitals:   Vitals:    11/19/24 1906 11/19/24 1920 11/19/24 1931 11/19/24 2001   BP: 128/80  117/75 124/78   Pulse: 112 104 100 108   Resp:       Temp:  97.6 °F (36.4 °C)     TempSrc:  Axillary     SpO2: 95%  95% 93%   Weight:       Height:           Active LDAs/IV Access:   Lines, Drains & Airways       Active LDAs       Name Placement date Placement time Site Days    Peripheral IV 11/19/24 1749 Left Antecubital 11/19/24 1749  Antecubital  less than 1                    Labs (abnormal labs have a star):   Labs Reviewed   COMPREHENSIVE METABOLIC PANEL - Abnormal; Notable for the following components:       Result Value    Potassium 3.3 (*)     Chloride 109 (*)     CO2 21.0 (*)     All other components within normal limits    Narrative:     GFR Normal >60  Chronic Kidney Disease <60  Kidney Failure <15     URINE DRUG SCREEN - Abnormal; Notable for the following  components:    Benzodiazepine Screen, Urine Positive (*)     All other components within normal limits    Narrative:     Negative Thresholds Per Drugs Screened:    Amphetamines                 500 ng/ml  Barbiturates                 200 ng/ml  Benzodiazepines              100 ng/ml  Cocaine                      300 ng/ml  Methadone                    300 ng/ml  Opiates                      300 ng/ml  Oxycodone                    100 ng/ml  THC                           50 ng/ml  Fentanyl                       5 ng/ml      The Normal Value for all drugs tested is negative. This report includes final unconfirmed screening results to be used for medical treatment purposes only. Unconfirmed results must not be used for non-medical purposes such as employment or legal testing. Clinical consideration should be applied to any drug of abuse test, particularly when unconfirmed results are used.           URINALYSIS W/ MICROSCOPIC IF INDICATED (NO CULTURE) - Abnormal; Notable for the following components:    Leuk Esterase, UA Trace (*)     All other components within normal limits   CBC WITH AUTO DIFFERENTIAL - Abnormal; Notable for the following components:    RBC 3.11 (*)     Hemoglobin 10.1 (*)     Hematocrit 31.0 (*)     MCV 99.7 (*)     All other components within normal limits   URINALYSIS, MICROSCOPIC ONLY - Abnormal; Notable for the following components:    WBC, UA 3-5 (*)     All other components within normal limits   SALICYLATE LEVEL - Normal    Narrative:     Therapeutic range for Salicylates:  3.0 - 10.0 mg/dL for antipyretic/analgesic conditions  15.0 - 30.0 mg/dL for anti-inflammatory conditions   ACETAMINOPHEN LEVEL - Normal   PROTIME-INR - Normal   MAGNESIUM - Normal   ETHANOL   CBC AND DIFFERENTIAL    Narrative:     The following orders were created for panel order CBC & Differential.  Procedure                               Abnormality         Status                     ---------                                -----------         ------                     CBC Auto Differential[[id number]]        Abnormal            Final result                 Please view results for these tests on the individual orders.       EKG:   ECG 12 Lead Other; od   Preliminary Result   HEART RATE=99  bpm   RR Epovmtkg=506  ms   AZ Naqdycjy=770  ms   P Horizontal Axis=1  deg   P Front Axis=44  deg   QRSD Interval=87  ms   QT Bwximnpg=266  ms   YOtX=999  ms   QRS Axis=37  deg   T Wave Axis=29  deg   - BORDERLINE ECG -   Sinus rhythm   Borderline T abnormalities, anterior leads   Date and Time of Study:2024-11-19 18:24:45          Meds given in ED:   Medications   sodium chloride 0.9 % flush 10 mL (has no administration in time range)   sodium chloride 0.9 % bolus 1,000 mL (1,000 mL Intravenous New Bag 11/19/24 1847)   potassium chloride 10 mEq in 100 mL IVPB (10 mEq Intravenous New Bag 11/19/24 1918)       Imaging results:  XR Chest 1 View    Result Date: 11/19/2024  Bands of atelectasis or scarring in the left lung base    This report was finalized on 11/19/2024 7:37 PM by Dr. Richard Maciel M.D on Workstation: [id number]       Ambulatory status:   - bedrest    Social issues:   Social History     Socioeconomic History    Marital status:    Tobacco Use    Smoking status: Every Day     Types: Cigarettes    Smokeless tobacco: Never   Substance and Sexual Activity    Alcohol use: No     Comment: sober since 1/2018    Drug use: No    Sexual activity: Defer       Peripheral Neurovascular  Peripheral Neurovascular (Adult)  Peripheral Neurovascular WDL: WDL, pulse assessment  Pulse Assessment: dorsalis pedis    Neuro Cognitive  Neuro Cognitive (Adult)  Cognitive/Neuro/Behavioral WDL: .WDL except, level of consciousness, mood/behavior, motor response, orientation, speech  Level of Consciousness: Alert  Speech: clear  Mood/Behavior: restless, uncooperative    Learning  Learning Assessment  Learning Readiness and Ability: cognitive limitation  noted    Respiratory  Respiratory WDL  Respiratory WDL: WDL    Abdominal Pain       Pain Assessments  Pain (Adult)  (0-10) Pain Rating: Rest: 8    NIH Stroke Scale       Rossy Troncoso RN  11/19/24 20:22 EST

## 2024-11-20 NOTE — H&P
"Group: [facility]        HISTORY AND PHYSICAL    Patient Identification:  Noelle Mehta  59 y.o.  female  1965  [id number]              CC: intentional overdose    History of Present Illness:  Noelle Mehta is a 59-year-old female with a past medical history of seizure disorder, depression, and psychosis.    She has a history of lung adenocarcinoma in the apical segment of the right upper lobe.  She underwent a da Shelia assisted thoracoscopy with upper lobe wedge resection and lymph node dissection  in January 2024.  She received a total of 4 cycles of cisplatin and pemetrexed, completed chemotherapy in April 2024.    Patient was recently admitted from 11/4 to 11/11 at [facility] for intentional seroquel overdose.  During hospitalization, patient required mechanical ventilation, ICU stay and was treated for pneumonia.  She was discharged to the [facility] inpatient psychiatric unit.  This patient's fourth recent overdose attempt (July 2024, August 2024, November 2024, current hospitalization).    She presented to the emergency department 11/19/2024 with intentional overdose.  According to EMS, patient took \"8 of her blue medicine packets\"would include 2400 mg of gabapentin, 4800 milligrams of Seroquel, and 90 mg of mirtazapine.  Patient is a poor historian, information obtained through conversation and adduction with the ED provider.  Patient is unaware when she took these medications only that it was \"daytime\" when she took the medicines.  Poison Control Center was contacted from the emergency department and recommended benzodiazepines if patient develops seizures, electrolyte correction, IV fluids and QTc monitoring.  Family attempted to be contacted from the emergency department including an's  and daughter, who were unwilling to provide history or answer questions about patient.  ED evaluation was largely unremarkable.  Patient admitted to the intensive care unit due to high " "concern for respiratory depression and development of seizures with Seroquel overdose.  Patient continues to express suicidal ideation-patient placed on a 72-hour hold in the emergency department, sitter currently at bedside.    Review of Systems:  Unable to obtain, patient is uncooperative with exam.    Past Medical History:  Past Medical History:   Diagnosis Date    Epilepsy     H/O ETOH abuse     patient has been sober since jan/2018    Seizures        Past Surgical History:  Past Surgical History:   Procedure Laterality Date    BACK SURGERY          Home Meds:  Medications Prior to Admission   Medication Sig Dispense Refill Last Dose/Taking    Conj Estrog-Medroxyprogest Ace (PREMPRO PO) Take  by mouth.       FLUoxetine HCl (PROZAC PO) Take  by mouth.       levETIRAcetam (KEPPRA) 500 MG tablet Take 1 tablet by mouth 2 (Two) Times a Day. 60 tablet 3     NAPROXEN PO Take  by mouth.       QUEtiapine (SEROquel) 50 MG tablet Take 50 mg by mouth Every Night.       topiramate (TOPAMAX) 100 MG tablet Take 200 mg by mouth Daily.          Allergies:  Allergies   Allergen Reactions    Codeine Anaphylaxis    Fentanyl Unknown - High Severity    Lactose Intolerance (Gi) Unknown - Low Severity    Naltrexone Unknown - Low Severity    Latex Swelling and Rash       Social History:   Social History     Socioeconomic History    Marital status:    Tobacco Use    Smoking status: Every Day     Types: Cigarettes    Smokeless tobacco: Never   Substance and Sexual Activity    Alcohol use: No     Comment: sober since 1/2018    Drug use: No    Sexual activity: Defer       Family History:  No family history on file.    Physical Exam:  /76 (BP Location: Right arm, Patient Position: Lying)   Pulse 99   Temp 97.6 °F (36.4 °C) (Oral)   Resp 18   Ht 167.6 cm (66\")   Wt 91.1 kg (200 lb 13.4 oz)   SpO2 95%   BMI 32.42 kg/m²  Body mass index is 32.42 kg/m². 95% 91.1 kg (200 lb 13.4 oz)  Constitutional: Middle aged pt in bed, No " acute respiratory distress, no accessory muscle use, complains of all over body discomfort  Head: - NCAT  Eyes: No pallor, Anicteric conjunctiva, EOMI.  ENMT:  Mallampati 3, no oral thrush. Dry MM.   NECK: Trachea midline, No thyromegaly, no palpable cervical LNpathy, no JVD  Heart: RRR, no murmur. No pedal edema   Lungs: TODD +, No wheezes/ crackles heard    Abdomen: Soft. No tenderness, guarding or rigidity. No palpable masses  Extremities: Extremities warm and well perfused. No cyanosis/ clubbing  Neuro: Conscious, alert to self, will not answer any other orientation questions, moves all extremities purposefully    PPE recommended per [facility] infectious disease Isolation protocol for the current clinical scenario(as mentioned below) was followed.      LABS:  SARS-CoV-2, NOE   Date Value Ref Range Status   08/24/2024 NEGATIVE Negative Final     Comment:     The 2019-CoV rRT-PCR Assay is only for use under a Food and Drug Administration Emergency Use Authorization. The performance characteristics of the assay were verified by the Clinical Microbiology Laboratory at the [facility].   Results should be used in conjunction with the patient's clinical symptoms, medical history, and other clinical/laboratory findings to determine an overall clinical diagnosis. Negative results do not preclude infection with SARS-CoV-2 (COVID-19).    Test parameters have not been validated for screening asymptomatic patients.       Lab Results   Component Value Date    CALCIUM 8.8 11/19/2024    PHOS 2.5 11/19/2024     Results from last 7 days   Lab Units 11/19/24  2111 11/19/24  1817   MAGNESIUM mg/dL  --  2.2   SODIUM mmol/L  --  141   POTASSIUM mmol/L  --  3.3*   CHLORIDE mmol/L  --  109*   CO2 mmol/L  --  21.0*   BUN mg/dL  --  11   CREATININE mg/dL  --  0.92   GLUCOSE mg/dL  --  91   CALCIUM mg/dL  --  8.8   WBC 10*3/mm3 5.34 5.76   HEMOGLOBIN g/dL 10.1* 10.1*   PLATELETS 10*3/mm3 349 362   ALT (SGPT)  "U/L  --  19   AST (SGOT) U/L  --  17     Lab Results   Component Value Date    CKTOTAL 120 08/20/2024    TROPONINI <0.010 08/19/2024    TROPONINT <0.010 08/21/2022                         Results from last 7 days   Lab Units 11/19/24  1817   INR  1.08         No results found for: \"TSH\"  Estimated Creatinine Clearance: 75.8 mL/min (by C-G formula based on SCr of 0.92 mg/dL).  Results from last 7 days   Lab Units 11/19/24  1905   NITRITE UA  Negative   WBC UA /HPF 3-5*   BACTERIA UA /HPF None Seen   SQUAM EPITHEL UA /HPF 0-2        Imaging: I personally visualized the images of scans/x-rays performed within last 3 days.      Assessment:  Intentional overdose  Suicidal ideation  Seizure disorder  Depression  Psychosis  Mood disorder  History of    Recommendations:  Intentional overdose  Suicidal ideation  Fourth intentional overdose in the past couple months-July 2024, August 2024, November 2024, current hospitalization.  Patient ingested approximately 2400 mg of gabapentin, 4800 mg of Seroquel, and 90 mg of mirtazapine unknown time of ingestion.  Poison control following.  Monitor respiratory status-patient currently oxygen appropriate on room air, protecting airway.  Benzodiazepine as needed if patient has breakthrough seizures.  Monitor QTc.  Discontinue Seroquel.  72-hour hold.  Sitter at bedside.  Access and psychiatrist consult.    Seizure disorder  Home medication: Keppra, continue.    Depression  Psychosis  Mood disorder  Home medications: Fluoxetine, Seroquel, Topamax, on hold.    NPO  Full code  SCDs      Patient was placed in face mask upon entering room and kept mask on throughout our encounter. I wore full protective equipment throughout this patient encounter including a face mask, gown and gloves. Hand hygiene was performed before donning protective equipment and after removal when leaving the room.    Liz Perales, APRN  11/19/2024  21:36 EST      Much of this encounter note is an electronic " transcription/translation of spoken language to printed text using Dragon Software.

## 2024-11-20 NOTE — PROGRESS NOTES
"      [facility]         Dr Flor Sayied   LOS: 1 day   Patient Care Team:  System, Provider Not In as PCP - General    Chief Complaint: Intentional overdose polypharmacy psych issues multiple issues as listed below    Interval History: She is sleepy difficult to arouse.  Currently on nasal cannula oxygen.  Currently protecting her airway.    REVIEW OF SYSTEMS:   Unreliable with the current condition    Ventilator/Non-Invasive Ventilation Settings (From admission, onward)      None              Vital Signs  Temp:  [97 °F (36.1 °C)-97.6 °F (36.4 °C)] 97.3 °F (36.3 °C)  Heart Rate:  [] 76  Resp:  [12-20] 13  BP: ()/(67-91) 117/89    Intake/Output Summary (Last 24 hours) at 11/20/2024 0847  Last data filed at 11/20/2024 0430  Gross per 24 hour   Intake 1100 ml   Output 1450 ml   Net -350 ml     Flowsheet Rows      Flowsheet Row First Filed Value   Admission Height 172.7 cm (67.99\") Documented at 11/19/2024 1744   Admission Weight 104 kg (229 lb 4.5 oz) Documented at 11/19/2024 1744            Physical Exam:  Patient is examined using the personal protective equipment as per guidelines from infection control for this particular patient as enacted.  Hand hygiene was performed before and after patient interaction.   General Appearance:     sleepy moans and groans.  ENT normocephalic atraumatic  Neck midline trachea, no thyromegaly   Lungs:   Diminished breath sounds bilaterally    Heart:    Regular rhythm and normal rate, normal S1 and S2, no            murmur, no gallop, no rub, no click   Chest Wall:    No abnormalities observed   Abdomen:     Normal bowel sounds, no masses, no organomegaly, soft        nontender, nondistended, no guarding, no rebound                tenderness   Extremities:    no edema, no cyanosis, no             redness  CNS Sleepy but moving all extremities well.  Currently not following commands for me.  Skin no rashes no nodules  Musculoskeletal no cyanosis no clubbing " normal range of motion     Results Review:        Results from last 7 days   Lab Units 11/20/24 0415 11/19/24 2111 11/19/24 1817   SODIUM mmol/L 145 144 141   POTASSIUM mmol/L 3.1* 3.7 3.3*   CHLORIDE mmol/L 116* 114* 109*   CO2 mmol/L 21.0* 22.7 21.0*   BUN mg/dL 10 10 11   CREATININE mg/dL 0.77 0.79 0.92   GLUCOSE mg/dL 84 99 91   CALCIUM mg/dL 7.6* 8.5* 8.8         Results from last 7 days   Lab Units 11/20/24 0415 11/19/24 2111 11/19/24 1817   WBC 10*3/mm3 3.92 5.34 5.76   HEMOGLOBIN g/dL 9.2* 10.1* 10.1*   HEMATOCRIT % 28.2* 30.9* 31.0*   PLATELETS 10*3/mm3 292 349 362     Results from last 7 days   Lab Units 11/19/24 1817   INR  1.08         Results from last 7 days   Lab Units 11/20/24 0415   MAGNESIUM mg/dL 2.1               I reviewed the patient's new clinical results.  I personally viewed and interpreted the patient's chest x-ray.        Medication Review:   levETIRAcetam, 500 mg, Intravenous, Q12H  mupirocin, 1 Application, Each Nare, BID  potassium chloride, 10 mEq, Intravenous, Q1H  senna-docusate sodium, 2 tablet, Oral, BID  sodium chloride, 10 mL, Intravenous, Q12H             ASSESSMENT:   Intentional overdose  Suicidal ideation  Seizure disorder  Depression  Psychosis  Mood disorder  History of depression/psychosis    PLAN:  Events noted chart reviewed  Intentional overdose fourth 1 with Seroquel gabapentin and mirtazapine.  Monitor QTc.  Cardiac rhythm currently stable.  Still quite sleepy needs to be watched with close monitoring of the airway  Sitter in place.  Psych evaluation  Resume Keppra at a lower dose to avoid sedation  ICU core measures  Keep with the ICU until mental status improves      Chacho Van MD  11/20/24  08:47 EST

## 2024-11-20 NOTE — CASE MANAGEMENT/SOCIAL WORK
[facility]  [facility]     Patient Name: Noelle Mehta  MRN: [id number]  Today's Date: 11/20/2024    Admit Date: 11/19/2024    Plan: Pending clinical progress. Access team to see patient.   Discharge Needs Assessment       Row Name 11/20/24 1327       Living Environment    People in Home child(karan), adult    Primary Care Provided by self    Provides Primary Care For no one    Family Caregiver if Needed none    Quality of Family Relationships unable to assess    Able to Return to Prior Arrangements yes       Resource/Environmental Concerns    Transportation Concerns other (see comments);none       Transition Planning    Patient/Family Anticipates Transition to other (see comments)    Patient/Family Anticipated Services at Transition other (see comments)  access team to determine patient needs    Transportation Anticipated health plan transportation       Discharge Needs Assessment    Readmission Within the Last 30 Days unable to assess    Equipment Currently Used at Home cane, quad tip    Concerns to be Addressed discharge planning    Equipment Needed After Discharge --  TBD    Discharge Facility/Level of Care Needs other (see comments)  RTBD    Provided Post Acute Provider List? N/A    Provided Post Acute Provider Quality & Resource List? N/A    Offered/Gave Vendor List no    Current Discharge Risk substance use/abuse;psychiatric illness                   Discharge Plan       Row Name 11/20/24 8822       Plan    Plan Pending clinical progress. Access team to see patient.    Patient/Family in Agreement with Plan unable to assess    Plan Comments Spoke with patient at bedside. Patient still with episodes of being unable to answer some questions. Patient states she lives with her daughter and is IADL with her quad cane. Facesheet verified. Patient unable to give PCP name or answer if she has a PCP at this time. Barriers to dc per notes:  Monitor QTc.  Cardiac rhythm currently stable.  Still quite  sleepy needs to be watched with close monitoring of the airway.  Sitter in place.  Psych evaluation                  Continued Care and Services - Admitted Since 11/19/2024    No active coordination exists for this encounter.       Expected Discharge Date and Time       Expected Discharge Date Expected Discharge Time    Nov 22, 2024            Demographic Summary       Row Name 11/20/24 1327       General Information    Admission Type inpatient    Arrived From emergency department    Referral Source admission list    Reason for Consult discharge planning    Preferred Language English                   Functional Status       Row Name 11/20/24 1327       Functional Status    Usual Activity Tolerance moderate    Current Activity Tolerance fair       Functional Status, IADL    Medications independent    Meal Preparation independent    Housekeeping independent    Laundry independent    Shopping independent       Mental Status    General Appearance WDL WDL       Mental Status Summary    Recent Changes in Mental Status/Cognitive Functioning unable to assess                   Psychosocial    No documentation.                  Abuse/Neglect    No documentation.                  Legal    No documentation.                  Substance Abuse    No documentation.                  Patient Forms    No documentation.                     Melissa Hassan RN

## 2024-11-21 ENCOUNTER — DOCUMENTATION (OUTPATIENT)
Dept: PSYCHIATRY | Facility: HOSPITAL | Age: 59
End: 2024-11-21
Payer: COMMERCIAL

## 2024-11-21 VITALS
OXYGEN SATURATION: 91 % | TEMPERATURE: 98 F | DIASTOLIC BLOOD PRESSURE: 72 MMHG | RESPIRATION RATE: 14 BRPM | HEART RATE: 90 BPM | HEIGHT: 66 IN | WEIGHT: 201.5 LBS | BODY MASS INDEX: 32.38 KG/M2 | SYSTOLIC BLOOD PRESSURE: 108 MMHG

## 2024-11-21 LAB
ANION GAP SERPL CALCULATED.3IONS-SCNC: 10 MMOL/L (ref 5–15)
BASOPHILS # BLD AUTO: 0.04 10*3/MM3 (ref 0–0.2)
BASOPHILS NFR BLD AUTO: 0.7 % (ref 0–1.5)
BUN SERPL-MCNC: 12 MG/DL (ref 6–20)
BUN/CREAT SERPL: 12.6 (ref 7–25)
CALCIUM SPEC-SCNC: 8.9 MG/DL (ref 8.6–10.5)
CHLORIDE SERPL-SCNC: 107 MMOL/L (ref 98–107)
CO2 SERPL-SCNC: 21 MMOL/L (ref 22–29)
CREAT SERPL-MCNC: 0.95 MG/DL (ref 0.57–1)
DEPRECATED RDW RBC AUTO: 49.8 FL (ref 37–54)
EGFRCR SERPLBLD CKD-EPI 2021: 69.6 ML/MIN/1.73
EOSINOPHIL # BLD AUTO: 0.22 10*3/MM3 (ref 0–0.4)
EOSINOPHIL NFR BLD AUTO: 3.6 % (ref 0.3–6.2)
ERYTHROCYTE [DISTWIDTH] IN BLOOD BY AUTOMATED COUNT: 13.9 % (ref 12.3–15.4)
GLUCOSE BLDC GLUCOMTR-MCNC: 101 MG/DL (ref 70–130)
GLUCOSE SERPL-MCNC: 107 MG/DL (ref 65–99)
HCT VFR BLD AUTO: 34.8 % (ref 34–46.6)
HGB BLD-MCNC: 11 G/DL (ref 12–15.9)
IMM GRANULOCYTES # BLD AUTO: 0.02 10*3/MM3 (ref 0–0.05)
IMM GRANULOCYTES NFR BLD AUTO: 0.3 % (ref 0–0.5)
LYMPHOCYTES # BLD AUTO: 1.86 10*3/MM3 (ref 0.7–3.1)
LYMPHOCYTES NFR BLD AUTO: 30.7 % (ref 19.6–45.3)
MAGNESIUM SERPL-MCNC: 2.4 MG/DL (ref 1.6–2.6)
MCH RBC QN AUTO: 31.3 PG (ref 26.6–33)
MCHC RBC AUTO-ENTMCNC: 31.6 G/DL (ref 31.5–35.7)
MCV RBC AUTO: 99.1 FL (ref 79–97)
MONOCYTES # BLD AUTO: 0.38 10*3/MM3 (ref 0.1–0.9)
MONOCYTES NFR BLD AUTO: 6.3 % (ref 5–12)
NEUTROPHILS NFR BLD AUTO: 3.54 10*3/MM3 (ref 1.7–7)
NEUTROPHILS NFR BLD AUTO: 58.4 % (ref 42.7–76)
NRBC BLD AUTO-RTO: 0 /100 WBC (ref 0–0.2)
PHOSPHATE SERPL-MCNC: 2.7 MG/DL (ref 2.5–4.5)
PLATELET # BLD AUTO: 369 10*3/MM3 (ref 140–450)
PMV BLD AUTO: 9.6 FL (ref 6–12)
POTASSIUM SERPL-SCNC: 3.8 MMOL/L (ref 3.5–5.2)
RBC # BLD AUTO: 3.51 10*6/MM3 (ref 3.77–5.28)
SODIUM SERPL-SCNC: 138 MMOL/L (ref 136–145)
WBC NRBC COR # BLD AUTO: 6.06 10*3/MM3 (ref 3.4–10.8)

## 2024-11-21 PROCEDURE — 84100 ASSAY OF PHOSPHORUS: CPT

## 2024-11-21 PROCEDURE — 85025 COMPLETE CBC W/AUTO DIFF WBC: CPT

## 2024-11-21 PROCEDURE — 90791 PSYCH DIAGNOSTIC EVALUATION: CPT

## 2024-11-21 PROCEDURE — 80048 BASIC METABOLIC PNL TOTAL CA: CPT

## 2024-11-21 PROCEDURE — 94761 N-INVAS EAR/PLS OXIMETRY MLT: CPT

## 2024-11-21 PROCEDURE — 93005 ELECTROCARDIOGRAM TRACING: CPT | Performed by: INTERNAL MEDICINE

## 2024-11-21 PROCEDURE — 82948 REAGENT STRIP/BLOOD GLUCOSE: CPT

## 2024-11-21 PROCEDURE — 83735 ASSAY OF MAGNESIUM: CPT

## 2024-11-21 PROCEDURE — 94799 UNLISTED PULMONARY SVC/PX: CPT

## 2024-11-21 PROCEDURE — 25010000002 LEVETRIRACETAM PER 10 MG

## 2024-11-21 RX ADMIN — LEVETIRACETAM 500 MG: 100 INJECTION INTRAVENOUS at 10:48

## 2024-11-21 NOTE — CASE MANAGEMENT/SOCIAL WORK
Case Management Discharge Note      Final Note: [facility] for inpatient psych admission transport via [facility] at 1200. M.Kenya RN/CCP    Provided Post Acute Provider List?: N/A  Provided Post Acute Provider Quality & Resource List?: N/A        Transportation Services  Ambulance: [facility]  [facility] Ambulance Status: Accepted    Final Discharge Disposition Code: 65 - psychiatric hospital or unit   40w2d

## 2024-11-21 NOTE — CASE MANAGEMENT/SOCIAL WORK
, Received a call from Sarai at [facility]-   Pt has been accepted for an inpatient psych admission.  Accepting doctor- Dr. Kiran  Pt is going to the [address]  Call report to main number- [phone]    Spoke with Mable- [facility]- pt information given-  [facility]  with [facility].

## 2024-11-21 NOTE — CASE MANAGEMENT/SOCIAL WORK
Continued Stay Note  [facility]     Patient Name: Noelle Mehta  MRN: [id number]  Today's Date: 11/21/2024    Admit Date: 11/19/2024    Plan: Awaiting bed availability at [facility] [place]   Discharge Plan       Row Name 11/21/24 0936       Plan    Plan Awaiting bed availability at [facility] [place]    Patient/Family in Agreement with Plan yes    Plan Comments CCP SW Shahana/access center who reports pt does not yet have a bed at the [place]. Access center aware pt now medically cleared for DC & will update CCP about bed availabilty at the [facility]. CCP provided Shahana with CCP contact information for an update. DC plan [place] pending bed availability. BEAU Phipps/DAVID             Expected Discharge Date and Time       Expected Discharge Date Expected Discharge Time    Nov 21, 2024    Radha Zambrano RN

## 2024-11-21 NOTE — NURSING NOTE
"0030 security called to bedside due to pt becoming increasingly agitated despite multiple attempts by RN and sitter to deescalate. Pt disconnected self from monitor, jumped OOB, ran into hallway and dragged belongings into pt room. RN and sitter attempted to safely redirect pt throughout. Pt became verbally abusive and continued to rifle through belongings. Security and charge RN Brigitte able to redirect pt into bed and back onto monitor. Pt valuables placed in safe by security and receipt placed in chart. Other pt belongings placed behind charge desk by Brigitte YANEZ. Pt updated on valuables and belongings location with agreeability.    0045 security called to bedside again due to pt agitation increasing again. Pt again disconnected self from monitor, jumped OOB, and stood in hallway asking \"where is my money\" \"where are my bags\" despite RNs x3 stating prior events and valuable and belongings location. Pt pushed this RN when asked to get back in bed. Pt still verbally abusive and unable to be redirected. Security assisted pt into bed and placed restraints. See orders and note.  "

## 2024-11-21 NOTE — CASE MANAGEMENT/SOCIAL WORK
"Physicians Statement of Medical Necessity for  Ambulance Transportation    GENERAL INFORMATION     Name: Noelle Mehta  YOB: 1965  Medicaid HMO #: [id number]  Transport Date: 11/21/2021 (Valid for round trips this date, or for scheduled repetitive trips for 60 days from the date signed below.)  Origin: [facility]  Destination: [facility] [place]   Is the Patient's stay covered under Medicare Part A (PPS/DRG?)No   Closest appropriate facility? Yes  If this a hosp-hosp transfer? Yes, describe services needed at 2nd facility not available at 1st facility inpatient psych admission  Is this a hospice patient? No    MEDICAL NECESSITY QUESTIONAIRE    Ambulance Transportation is medically necessary only if other means of transportation are contraindicated or would be potentially harmful to the patient.  To meet this requirement, the patient must be either \"bed confined\" or suffer from a condition such that transport by means other than an ambulance is contraindicated by the patient's condition.  The following questions must be answered by the healthcare professional signing below for this form to be valid:     1) Describe the MEDICAL CONDITION (physical and/or mental) of this patient AT THE TIME OF AMBULANCE TRANSPORT that requires the patient to be transported in an ambulance, and why transport by other means is contraindicated by the patient's condition: ointentional overdose, suicide ideation, seizures, sitter, impulsive  Past Medical History:   Diagnosis Date    Epilepsy     H/O ETOH abuse     patient has been sober since jan/2018    Seizures       Past Surgical History:   Procedure Laterality Date    BACK SURGERY        2) Is this patient \"bed confined\" as defined below?Yes   To be \"bed confined\" the patient must satisfy all three of the following criteria:  (1) unable to get up from bed without assistance; AND (2) unable to ambulate;  AND (3) unable to sit in a chair or wheelchair.  3) Can " this patient safely be transported by car or wheelchair van (I.e., may safely sit during transport, without an attendant or monitoring?)No   4. In addition to completing questions 1-3 above, please check any of the following conditions that apply*:          *Note: supporting documentation for any boxes checked must be maintained in the patient's medical records Danger to self/other and Need or possible need for restraints      SIGNATURE OF PHYSICIAN OR OTHER AUTHORIZED HEALTHCARE PROFESSIONAL    I certify that the above information is true and correct based on my evaluation of this patient, and represent that the patient requires transport by ambulance and that other forms of transport are contraindicated.  I understand that this information will be used by the Centers for Medicare and Medicaid Services (CMS) to support the determiniation of medical necessity for ambulance services, and I represent that I have personal knowledge of the patient's condition at the time of transport.    X   If this box is checked, I also certify that the patient is physically or mentally incapable of signing the ambulance service's claim form and that the institution with which I am affiliated has furnished care, services or assistance to the patient.  My signature below is made on behalf of the patient pursuant to 42 .36(b)(4). In accordance with 42 .37, the specific reason(s) that the patient is physically or mentally incapable of signing the claim for is as follows:     Signature of Physician or Healthcare Professional  Date/Time:        (For Scheduled repetitive transport, this form is not valid for transports performed more than 60 days after this date).                                                                                                                                            --------------------------------------------------------------------------------------------  Printed Name and Credentials of  Physician or Authorized Healthcare Professional     *Form must be signed by patient's attending physician for scheduled, repetitive transports,.  For non-repetitive ambulance transports, if unable to obtain the signature of the attending physician, any of the following may sign (please select below):     Physician  Clinical Nurse Specialist  Registered Nurse     Physician Assistant  Discharge Planner  Licensed Practical Nurse     Nurse Practitioner

## 2024-11-21 NOTE — CASE MANAGEMENT/SOCIAL WORK
Discharge Planning Assessment  [facility]     Patient Name: Noelle Mehta  MRN: [id number]  Today's Date: 11/21/2024    Admit Date: 11/19/2024    Plan: [facility] for inpatient psych admission transport via [facility] at 1200   Discharge Needs Assessment    No documentation.                  Discharge Plan       Row Name 11/21/24 1135       Plan    Plan [facility] for inpatient psych admission transport via [facility] at 1200    Patient/Family in Agreement with Plan yes    Plan Comments CCP received secure chat from [facility] who reports pt has been accepted at [facility] for an inpatient psych admission, accepting doctor is Dr. Kiran, going to the [address] & call report to main number, [phone]. CCP NATALIYA Akins/[facility] & scheduled transportation for 1200 today. [facility] updated Faviola/primary RN. DAVID Lundberg/[facility] admissions who asks for DC summary to be faxed to [phone]. CCP faxed DC summary & gave packet to Faviola. DC plan [facility] for inpatient psych admission transport via [facility] at 1200. BEAU Phipps/CCP      Row Name 11/21/24 0398       Plan    Plan Awaiting bed availability at [facility]    Patient/Family in Agreement with Plan yes    Plan Comments DAVID AN [facility] who reports pt does not yet have a bed at the [facility]. [facility] aware pt now medically cleared for DC & will update CCP about bed availabilty at the [facility]. [facility] provided Shahana with CCP contact information for an update. DC plan [facility] pending bed availability. Moise RN/CCP                  Continued Care and Services - Admitted Since 11/19/2024       Destination       Service Provider Request Status Services Address Phone Fax Patient Preferred    [facility] Accepted -- [address] [address] [phone] --                  Expected Discharge Date and Time       Expected Discharge Date Expected Discharge Time    Nov 21, 2024         Radha Zambrano RN

## 2024-11-21 NOTE — PAYOR COMM NOTE
"Howard Martinez (59 y.o. Female)     PLEASE SEE ATTACHED FOR INPT AUTH     REF # PT ID [id number]     PLEASE CALL JUSTINE CABELLO RN/ DEPT @ [phone]  OR FAX  DEPARTMENT @  [phone]    THANK YOU   JUSTINE CABELLO RN  [facility]          Date of Birth   1965    Social Security Number       Address   [address] [place] [address]    Home Phone   [phone]    MRN   [id number]       Orthodoxy   Alevism    Marital Status                               Admission Date   11/19/24    Admission Type   Emergency    Admitting Provider   Lauren Somers MD    Attending Provider   Chacho Van MD    Department, Room/Bed   [facility] INTENSIVE CARE, I371/1       Discharge Date       Discharge Disposition   [facility] (DC - External)    Discharge Destination                                 Attending Provider: Chacho Van MD    Allergies: Codeine, Fentanyl, Lactose Intolerance (Gi), Naltrexone, Latex    Isolation: None   Infection: None   Code Status: CPR    Ht: 167.6 cm (66\")   Wt: 91.4 kg (201 lb 8 oz)    Admission Cmt: None   Principal Problem: Intentional overdose [T50.902A]                   Active Insurance as of 11/19/2024       Primary Coverage       Payor Plan Insurance Group Employer/Plan Group    [facility] BY AMADOU [facility] BY AMADOU [id number]       Payor Plan Address Payor Plan Phone Number Payor Plan Fax Number Effective Dates    [address] [address]   1/1/2021 - None Entered    [facility] [id number]         Subscriber Name Subscriber Birth Date Member ID       HOWARD MARTINEZ 1965 [id number]                     Emergency Contacts        (Rel.) Home Phone Work Phone Mobile Phone    Taye Martinez (EX SPOUSE) (Friend) [phone] -- --              [place]: NPI [id number]  Tax ID [id number]     History & Physical        Liz Perales, APRN at 11/19/24 2136       Attestation signed by Lauren Somers MD " "at 11/20/24 0626    I have reviewed this documentation and agree.  I personally obtained history, reviewed the data and and examined the patient. I provided more than half of the total time dedicated to the treatment of this patient.       CC time 42 min    Electronically signed by Lauren Somers MD, 11/20/24, 6:25 AM EST.                     Group: [facility]        HISTORY AND PHYSICAL    Patient Identification:  Noelle Mehta  59 y.o.  female  1965  [id number]              CC: intentional overdose    History of Present Illness:  Noelle Mehta is a 59-year-old female with a past medical history of seizure disorder, depression, and psychosis.    She has a history of lung adenocarcinoma in the apical segment of the right upper lobe.  She underwent a da Shelia assisted thoracoscopy with upper lobe wedge resection and lymph node dissection  in January 2024.  She received a total of 4 cycles of cisplatin and pemetrexed, completed chemotherapy in April 2024.    Patient was recently admitted from 11/4 to 11/11 at [facility] for intentional seroquel overdose.  During hospitalization, patient required mechanical ventilation, ICU stay and was treated for pneumonia.  She was discharged to the [facility] inpatient psychiatric unit.  This patient's fourth recent overdose attempt (July 2024, August 2024, November 2024, current hospitalization).    She presented to the emergency department 11/19/2024 with intentional overdose.  According to EMS, patient took \"8 of her blue medicine packets\"would include 2400 mg of gabapentin, 4800 milligrams of Seroquel, and 90 mg of mirtazapine.  Patient is a poor historian, information obtained through conversation and adduction with the ED provider.  Patient is unaware when she took these medications only that it was \"daytime\" when she took the medicines.  Poison Control Center was contacted from the emergency department and recommended benzodiazepines if patient " develops seizures, electrolyte correction, IV fluids and QTc monitoring.  Family attempted to be contacted from the emergency department including an's  and daughter, who were unwilling to provide history or answer questions about patient.  ED evaluation was largely unremarkable.  Patient admitted to the intensive care unit due to high concern for respiratory depression and development of seizures with Seroquel overdose.  Patient continues to express suicidal ideation-patient placed on a 72-hour hold in the emergency department, sitter currently at bedside.    Review of Systems:  Unable to obtain, patient is uncooperative with exam.    Past Medical History:  Past Medical History:   Diagnosis Date    Epilepsy     H/O ETOH abuse     patient has been sober since jan/2018    Seizures        Past Surgical History:  Past Surgical History:   Procedure Laterality Date    BACK SURGERY          Home Meds:  Medications Prior to Admission   Medication Sig Dispense Refill Last Dose/Taking    Conj Estrog-Medroxyprogest Ace (PREMPRO PO) Take  by mouth.       FLUoxetine HCl (PROZAC PO) Take  by mouth.       levETIRAcetam (KEPPRA) 500 MG tablet Take 1 tablet by mouth 2 (Two) Times a Day. 60 tablet 3     NAPROXEN PO Take  by mouth.       QUEtiapine (SEROquel) 50 MG tablet Take 50 mg by mouth Every Night.       topiramate (TOPAMAX) 100 MG tablet Take 200 mg by mouth Daily.          Allergies:  Allergies   Allergen Reactions    Codeine Anaphylaxis    Fentanyl Unknown - High Severity    Lactose Intolerance (Gi) Unknown - Low Severity    Naltrexone Unknown - Low Severity    Latex Swelling and Rash       Social History:   Social History     Socioeconomic History    Marital status:    Tobacco Use    Smoking status: Every Day     Types: Cigarettes    Smokeless tobacco: Never   Substance and Sexual Activity    Alcohol use: No     Comment: sober since 1/2018    Drug use: No    Sexual activity: Defer       Family History:  No  "family history on file.    Physical Exam:  /76 (BP Location: Right arm, Patient Position: Lying)   Pulse 99   Temp 97.6 °F (36.4 °C) (Oral)   Resp 18   Ht 167.6 cm (66\")   Wt 91.1 kg (200 lb 13.4 oz)   SpO2 95%   BMI 32.42 kg/m²  Body mass index is 32.42 kg/m². 95% 91.1 kg (200 lb 13.4 oz)  Constitutional: Middle aged pt in bed, No acute respiratory distress, no accessory muscle use, complains of all over body discomfort  Head: - NCAT  Eyes: No pallor, Anicteric conjunctiva, EOMI.  ENMT:  Mallampati 3, no oral thrush. Dry MM.   NECK: Trachea midline, No thyromegaly, no palpable cervical LNpathy, no JVD  Heart: RRR, no murmur. No pedal edema   Lungs: TODD +, No wheezes/ crackles heard    Abdomen: Soft. No tenderness, guarding or rigidity. No palpable masses  Extremities: Extremities warm and well perfused. No cyanosis/ clubbing  Neuro: Conscious, alert to self, will not answer any other orientation questions, moves all extremities purposefully    PPE recommended per [facility] infectious disease Isolation protocol for the current clinical scenario(as mentioned below) was followed.      LABS:  SARS-CoV-2, NOE   Date Value Ref Range Status   08/24/2024 NEGATIVE Negative Final     Comment:     The 2019-CoV rRT-PCR Assay is only for use under a Food and Drug Administration Emergency Use Authorization. The performance characteristics of the assay were verified by the Clinical Microbiology Laboratory at the [facility].   Results should be used in conjunction with the patient's clinical symptoms, medical history, and other clinical/laboratory findings to determine an overall clinical diagnosis. Negative results do not preclude infection with SARS-CoV-2 (COVID-19).    Test parameters have not been validated for screening asymptomatic patients.       Lab Results   Component Value Date    CALCIUM 8.8 11/19/2024    PHOS 2.5 11/19/2024     Results from last 7 days   Lab Units " "11/19/24 2111 11/19/24 1817   MAGNESIUM mg/dL  --  2.2   SODIUM mmol/L  --  141   POTASSIUM mmol/L  --  3.3*   CHLORIDE mmol/L  --  109*   CO2 mmol/L  --  21.0*   BUN mg/dL  --  11   CREATININE mg/dL  --  0.92   GLUCOSE mg/dL  --  91   CALCIUM mg/dL  --  8.8   WBC 10*3/mm3 5.34 5.76   HEMOGLOBIN g/dL 10.1* 10.1*   PLATELETS 10*3/mm3 349 362   ALT (SGPT) U/L  --  19   AST (SGOT) U/L  --  17     Lab Results   Component Value Date    CKTOTAL 120 08/20/2024    TROPONINI <0.010 08/19/2024    TROPONINT <0.010 08/21/2022                         Results from last 7 days   Lab Units 11/19/24  1817   INR  1.08         No results found for: \"TSH\"  Estimated Creatinine Clearance: 75.8 mL/min (by C-G formula based on SCr of 0.92 mg/dL).  Results from last 7 days   Lab Units 11/19/24  1905   NITRITE UA  Negative   WBC UA /HPF 3-5*   BACTERIA UA /HPF None Seen   SQUAM EPITHEL UA /HPF 0-2        Imaging: I personally visualized the images of scans/x-rays performed within last 3 days.      Assessment:  Intentional overdose  Suicidal ideation  Seizure disorder  Depression  Psychosis  Mood disorder  History of    Recommendations:  Intentional overdose  Suicidal ideation  Fourth intentional overdose in the past couple months-July 2024, August 2024, November 2024, current hospitalization.  Patient ingested approximately 2400 mg of gabapentin, 4800 mg of Seroquel, and 90 mg of mirtazapine unknown time of ingestion.  Poison control following.  Monitor respiratory status-patient currently oxygen appropriate on room air, protecting airway.  Benzodiazepine as needed if patient has breakthrough seizures.  Monitor QTc.  Discontinue Seroquel.  72-hour hold.  Sitter at bedside.  Access and psychiatrist consult.    Seizure disorder  Home medication: Keppra, continue.    Depression  Psychosis  Mood disorder  Home medications: Fluoxetine, Seroquel, Topamax, on hold.    NPO  Full code  SCDs      Patient was placed in face mask upon entering room " and kept mask on throughout our encounter. I wore full protective equipment throughout this patient encounter including a face mask, gown and gloves. Hand hygiene was performed before donning protective equipment and after removal when leaving the room.    Liz Perales, APRN  11/19/2024  21:36 EST      Much of this encounter note is an electronic transcription/translation of spoken language to printed text using Dragon Software.     Electronically signed by Lauren Somers MD at 11/20/24 0626          Emergency Department Notes        Rossy Troncoso RN at 11/19/24 2022          Nursing report ED to floor  Noelle Mehta  59 y.o.  female    HPI :  HPI  Stated Reason for Visit: suicide attempt, psych eval, drug overdose  History Obtained From: EMS    Chief Complaint  Chief Complaint   Patient presents with    Suicide Attempt    Drug Overdose    Psychiatric Evaluation       Admitting doctor:   Lauren Somers MD    Admitting diagnosis:   The primary encounter diagnosis was Intentional drug overdose, initial encounter: Polypharmacy overdose including Seroquel, Remeron, Neurontin. A diagnosis of Suicide attempt was also pertinent to this visit.    Code status:   Current Code Status       Date Active Code Status Order ID Comments User Context       Not on file            Allergies:   Codeine, Fentanyl, Lactose intolerance (gi), Naltrexone, and Latex    Isolation:   No active isolations    Intake and Output    Intake/Output Summary (Last 24 hours) at 11/19/2024 2022  Last data filed at 11/19/2024 1914  Gross per 24 hour   Intake --   Output 500 ml   Net -500 ml       Weight:       11/19/24  1744   Weight: 104 kg (229 lb 4.5 oz)       Most recent vitals:   Vitals:    11/19/24 1906 11/19/24 1920 11/19/24 1931 11/19/24 2001   BP: 128/80  117/75 124/78   Pulse: 112 104 100 108   Resp:       Temp:  97.6 °F (36.4 °C)     TempSrc:  Axillary     SpO2: 95%  95% 93%   Weight:       Height:           Active LDAs/IV Access:   Lines,  Drains & Airways       Active LDAs       Name Placement date Placement time Site Days    Peripheral IV 11/19/24 1749 Left Antecubital 11/19/24 1749  Antecubital  less than 1                    Labs (abnormal labs have a star):   Labs Reviewed   COMPREHENSIVE METABOLIC PANEL - Abnormal; Notable for the following components:       Result Value    Potassium 3.3 (*)     Chloride 109 (*)     CO2 21.0 (*)     All other components within normal limits    Narrative:     GFR Normal >60  Chronic Kidney Disease <60  Kidney Failure <15     URINE DRUG SCREEN - Abnormal; Notable for the following components:    Benzodiazepine Screen, Urine Positive (*)     All other components within normal limits    Narrative:     Negative Thresholds Per Drugs Screened:    Amphetamines                 500 ng/ml  Barbiturates                 200 ng/ml  Benzodiazepines              100 ng/ml  Cocaine                      300 ng/ml  Methadone                    300 ng/ml  Opiates                      300 ng/ml  Oxycodone                    100 ng/ml  THC                           50 ng/ml  Fentanyl                       5 ng/ml      The Normal Value for all drugs tested is negative. This report includes final unconfirmed screening results to be used for medical treatment purposes only. Unconfirmed results must not be used for non-medical purposes such as employment or legal testing. Clinical consideration should be applied to any drug of abuse test, particularly when unconfirmed results are used.           URINALYSIS W/ MICROSCOPIC IF INDICATED (NO CULTURE) - Abnormal; Notable for the following components:    Leuk Esterase, UA Trace (*)     All other components within normal limits   CBC WITH AUTO DIFFERENTIAL - Abnormal; Notable for the following components:    RBC 3.11 (*)     Hemoglobin 10.1 (*)     Hematocrit 31.0 (*)     MCV 99.7 (*)     All other components within normal limits   URINALYSIS, MICROSCOPIC ONLY - Abnormal; Notable for the  following components:    WBC, UA 3-5 (*)     All other components within normal limits   SALICYLATE LEVEL - Normal    Narrative:     Therapeutic range for Salicylates:  3.0 - 10.0 mg/dL for antipyretic/analgesic conditions  15.0 - 30.0 mg/dL for anti-inflammatory conditions   ACETAMINOPHEN LEVEL - Normal   PROTIME-INR - Normal   MAGNESIUM - Normal   ETHANOL   CBC AND DIFFERENTIAL    Narrative:     The following orders were created for panel order CBC & Differential.  Procedure                               Abnormality         Status                     ---------                               -----------         ------                     CBC Auto Differential[[id number]]        Abnormal            Final result                 Please view results for these tests on the individual orders.       EKG:   ECG 12 Lead Other; od   Preliminary Result   HEART RATE=99  bpm   RR Xkikwurw=531  ms   HI Ogeijkpc=460  ms   P Horizontal Axis=1  deg   P Front Axis=44  deg   QRSD Interval=87  ms   QT Cwugiqvp=812  ms   LFvQ=849  ms   QRS Axis=37  deg   T Wave Axis=29  deg   - BORDERLINE ECG -   Sinus rhythm   Borderline T abnormalities, anterior leads   Date and Time of Study:2024-11-19 18:24:45          Meds given in ED:   Medications   sodium chloride 0.9 % flush 10 mL (has no administration in time range)   sodium chloride 0.9 % bolus 1,000 mL (1,000 mL Intravenous New Bag 11/19/24 1847)   potassium chloride 10 mEq in 100 mL IVPB (10 mEq Intravenous New Bag 11/19/24 1918)       Imaging results:  XR Chest 1 View    Result Date: 11/19/2024  Bands of atelectasis or scarring in the left lung base    This report was finalized on 11/19/2024 7:37 PM by Dr. Richard Maciel M.D on Workstation: [id number]       Ambulatory status:   - bedrest    Social issues:   Social History     Socioeconomic History    Marital status:    Tobacco Use    Smoking status: Every Day     Types: Cigarettes    Smokeless tobacco: Never   Substance and  Sexual Activity    Alcohol use: No     Comment: sober since 1/2018    Drug use: No    Sexual activity: Defer       Peripheral Neurovascular  Peripheral Neurovascular (Adult)  Peripheral Neurovascular WDL: WDL, pulse assessment  Pulse Assessment: dorsalis pedis    Neuro Cognitive  Neuro Cognitive (Adult)  Cognitive/Neuro/Behavioral WDL: .WDL except, level of consciousness, mood/behavior, motor response, orientation, speech  Level of Consciousness: Alert  Speech: clear  Mood/Behavior: restless, uncooperative    Learning  Learning Assessment  Learning Readiness and Ability: cognitive limitation noted    Respiratory  Respiratory WDL  Respiratory WDL: WDL    Abdominal Pain       Pain Assessments  Pain (Adult)  (0-10) Pain Rating: Rest: 8    NIH Stroke Scale       Rossy Troncoso RN  11/19/24 20:22 EST     Electronically signed by Rossy Troncoso RN at 11/19/24 2022       Canelo Rodriguez MD at 11/19/24 1800        Procedure Orders    1. Critical Care [[id number]] ordered by Canelo Rodriguez MD                  EMERGENCY DEPARTMENT ENCOUNTER    Room Number:  I371/1  Date of encounter:  11/19/2024  PCP: System, Provider Not In  Historian: Patient and EMS  Relevant information and history provided by sources other than the patient will be included below and in the ED Course.  Review of pertinent past medical records may also be included in record below and ED Course.    HPI:  Chief Complaint: Intentional drug overdose  A complete HPI/ROS/PMH/PSH/SH/FH are unobtainable due to: This patient difficult to get an accurate history about when she took.  Uncertain of exactly the medicines that she took.  Context: Noelle Mehta is a 59 y.o. female who presents to the ED c/o according to EMS she took an overdose of just Seroquel which included sixteen 300 mg Seroquel doses.  Patient states that she took 3 of her medicine blue packets.  And those medicine blue packets if she took 8 it would add up to 2400 mg of Neurontin 4800 mg of  Seroquel and 90 mg of mirtazapine.  She is unaware of when she took the medicines.  She is unaware of how she got here.  She states that she wanted to go to the [facility] because she was depressed and fell down and was suicidal.  She states that she took the medicine during the daytime.  And again uncertain of the time.  Denies any alcohol or any other medicines.  As I talk with the patient and press and asking her questions about past history and past suicide attempts she states you have already made your mind up and she refuses to answer any more questions..  She does state that she has lank cancer she has a port in her left chest but has not started chemotherapy yet.        Previous Episodes: History of suicide substance abuse in the past history of bipolar disorder history of intentional drug overdose in the past.  Current Symptoms: Patient is a little drowsy but is answering questions.  Is alert and oriented    MEDICAL HISTORY REVIEWED  When I look at old records I can see that she had an intentional drug overdose on 11/4/2024 she had suicidal ideation.  She also had an intentional drug overdose on 8/19/2024 I can see that she does have a history of right upper lobe lung cancer.  And I can see she is had when I go back longstanding episodes of suicidal ideation that is intermittent as well as overdoses that go back as far as March 5, 2022.  History of bipolar disorder history of alcohol abuse and substance abuse.  She usually gets all her care at [facility].      PAST MEDICAL HISTORY  Active Ambulatory Problems     Diagnosis Date Noted    No Active Ambulatory Problems     Resolved Ambulatory Problems     Diagnosis Date Noted    No Resolved Ambulatory Problems     Past Medical History:   Diagnosis Date    Epilepsy     H/O ETOH abuse     Seizures          PAST SURGICAL HISTORY  Past Surgical History:   Procedure Laterality Date    BACK SURGERY           FAMILY HISTORY  No family history on file.      SOCIAL  HISTORY  Social History     Socioeconomic History    Marital status:    Tobacco Use    Smoking status: Every Day     Types: Cigarettes    Smokeless tobacco: Never   Substance and Sexual Activity    Alcohol use: No     Comment: sober since 1/2018    Drug use: No    Sexual activity: Defer         ALLERGIES  Codeine, Fentanyl, Lactose intolerance (gi), Naltrexone, and Latex        REVIEW OF SYSTEMS  Review of Systems     All systems reviewed and negative except for those discussed in HPI.       PHYSICAL EXAM    I have reviewed the triage vital signs and nursing notes.    ED Triage Vitals   Temp Heart Rate Resp BP SpO2   11/19/24 1746 11/19/24 1744 11/19/24 1744 11/19/24 1744 11/19/24 1744   97.6 °F (36.4 °C) 98 20 109/79 94 %      Temp src Heart Rate Source Patient Position BP Location FiO2 (%)   -- -- -- -- --              GENERAL: Female that appears a little drowsy.  No acute respiratory distress.Vital signs on my initial evaluation patient's heart rate is 90s to low 100s.  Blood pressure is normal.  Oxygen saturation is 93% on room air.  HENT: nares patent  Head/neck/ face are symmetric without gross deformity, signs of trauma, or swelling  EYES: no scleral icterus, no conjunctival pallor.  NECK: Supple, no meningismus  CV: regular rhythm, regular rate with intact distal pulses.  Patient has a port in her left upper chest.  Nontender to palpation.  Clean dry and intact  RESPIRATORY: normal effort and no respiratory distress.  Clear to auscultation bilaterally  ABDOMEN: soft and nontender.  Morbidly obese.  MUSCULOSKELETAL: no deformity.  Intact distal pulses that are equal strong and symmetric.  No coolness or cyanosis.  NEURO: alert and appropriate, moves all extremities, follows commands.  Patient is alert and oriented x 3.  Answers questions and follows commands.  She is a little forgetful about all the events that happened today.  No focal motor or sensory changes  SKIN: warm, dry    Vital signs and  nursing notes reviewed.        LAB RESULTS  Recent Results (from the past 24 hours)   Comprehensive Metabolic Panel    Collection Time: 11/19/24  6:17 PM    Specimen: Blood   Result Value Ref Range    Glucose 91 65 - 99 mg/dL    BUN 11 6 - 20 mg/dL    Creatinine 0.92 0.57 - 1.00 mg/dL    Sodium 141 136 - 145 mmol/L    Potassium 3.3 (L) 3.5 - 5.2 mmol/L    Chloride 109 (H) 98 - 107 mmol/L    CO2 21.0 (L) 22.0 - 29.0 mmol/L    Calcium 8.8 8.6 - 10.5 mg/dL    Total Protein 6.1 6.0 - 8.5 g/dL    Albumin 3.5 3.5 - 5.2 g/dL    ALT (SGPT) 19 1 - 33 U/L    AST (SGOT) 17 1 - 32 U/L    Alkaline Phosphatase 67 39 - 117 U/L    Total Bilirubin <0.2 0.0 - 1.2 mg/dL    Globulin 2.6 gm/dL    A/G Ratio 1.3 g/dL    BUN/Creatinine Ratio 12.0 7.0 - 25.0    Anion Gap 11.0 5.0 - 15.0 mmol/L    eGFR 72.3 >60.0 mL/min/1.73   Ethanol    Collection Time: 11/19/24  6:17 PM    Specimen: Blood   Result Value Ref Range    Ethanol <10 0 - 10 mg/dL    Ethanol % <0.010 %   Salicylate Level    Collection Time: 11/19/24  6:17 PM    Specimen: Blood   Result Value Ref Range    Salicylate <0.3 <=30.0 mg/dL   Acetaminophen Level    Collection Time: 11/19/24  6:17 PM    Specimen: Blood   Result Value Ref Range    Acetaminophen <5.0 0.0 - 30.0 mcg/mL   Protime-INR    Collection Time: 11/19/24  6:17 PM    Specimen: Blood   Result Value Ref Range    Protime 14.2 11.7 - 14.2 Seconds    INR 1.08 0.90 - 1.10   CBC Auto Differential    Collection Time: 11/19/24  6:17 PM    Specimen: Blood   Result Value Ref Range    WBC 5.76 3.40 - 10.80 10*3/mm3    RBC 3.11 (L) 3.77 - 5.28 10*6/mm3    Hemoglobin 10.1 (L) 12.0 - 15.9 g/dL    Hematocrit 31.0 (L) 34.0 - 46.6 %    MCV 99.7 (H) 79.0 - 97.0 fL    MCH 32.5 26.6 - 33.0 pg    MCHC 32.6 31.5 - 35.7 g/dL    RDW 13.8 12.3 - 15.4 %    RDW-SD 49.5 37.0 - 54.0 fl    MPV 9.5 6.0 - 12.0 fL    Platelets 362 140 - 450 10*3/mm3    Neutrophil % 46.4 42.7 - 76.0 %    Lymphocyte % 42.5 19.6 - 45.3 %    Monocyte % 7.3 5.0 - 12.0 %     Eosinophil % 2.8 0.3 - 6.2 %    Basophil % 0.7 0.0 - 1.5 %    Immature Grans % 0.3 0.0 - 0.5 %    Neutrophils, Absolute 2.67 1.70 - 7.00 10*3/mm3    Lymphocytes, Absolute 2.45 0.70 - 3.10 10*3/mm3    Monocytes, Absolute 0.42 0.10 - 0.90 10*3/mm3    Eosinophils, Absolute 0.16 0.00 - 0.40 10*3/mm3    Basophils, Absolute 0.04 0.00 - 0.20 10*3/mm3    Immature Grans, Absolute 0.02 0.00 - 0.05 10*3/mm3    nRBC 0.0 0.0 - 0.2 /100 WBC   Magnesium    Collection Time: 11/19/24  6:17 PM    Specimen: Blood   Result Value Ref Range    Magnesium 2.2 1.6 - 2.6 mg/dL   Phosphorus    Collection Time: 11/19/24  6:17 PM    Specimen: Blood   Result Value Ref Range    Phosphorus 2.5 2.5 - 4.5 mg/dL   ECG 12 Lead Other; od    Collection Time: 11/19/24  6:24 PM   Result Value Ref Range    QT Interval 374 ms    QTC Interval 480 ms   Urine Drug Screen - Urine, Clean Catch    Collection Time: 11/19/24  7:05 PM    Specimen: Urine, Clean Catch   Result Value Ref Range    Amphet/Methamphet, Screen Negative Negative    Barbiturates Screen, Urine Negative Negative    Benzodiazepine Screen, Urine Positive (A) Negative    Cocaine Screen, Urine Negative Negative    Opiate Screen Negative Negative    THC, Screen, Urine Negative Negative    Methadone Screen, Urine Negative Negative    Oxycodone Screen, Urine Negative Negative    Fentanyl, Urine Negative Negative   Urinalysis With Microscopic If Indicated (No Culture) - Urine, Clean Catch    Collection Time: 11/19/24  7:05 PM    Specimen: Urine, Clean Catch   Result Value Ref Range    Color, UA Yellow Yellow, Straw    Appearance, UA Clear Clear    pH, UA 8.0 5.0 - 8.0    Specific Gravity, UA 1.010 1.005 - 1.030    Glucose, UA Negative Negative    Ketones, UA Negative Negative    Bilirubin, UA Negative Negative    Blood, UA Negative Negative    Protein, UA Negative Negative    Leuk Esterase, UA Trace (A) Negative    Nitrite, UA Negative Negative    Urobilinogen, UA 0.2 E.U./dL 0.2 - 1.0 E.U./dL    Urinalysis, Microscopic Only - Urine, Clean Catch    Collection Time: 11/19/24  7:05 PM    Specimen: Urine, Clean Catch   Result Value Ref Range    RBC, UA 0-2 None Seen, 0-2 /HPF    WBC, UA 3-5 (A) None Seen, 0-2 /HPF    Bacteria, UA None Seen None Seen /HPF    Squamous Epithelial Cells, UA 0-2 None Seen, 0-2 /HPF    Hyaline Casts, UA None Seen None Seen /LPF    Methodology Automated Microscopy    POC Glucose Once    Collection Time: 11/19/24  9:00 PM    Specimen: Blood   Result Value Ref Range    Glucose 102 70 - 130 mg/dL   Basic Metabolic Panel    Collection Time: 11/19/24  9:11 PM    Specimen: Blood   Result Value Ref Range    Glucose 99 65 - 99 mg/dL    BUN 10 6 - 20 mg/dL    Creatinine 0.79 0.57 - 1.00 mg/dL    Sodium 144 136 - 145 mmol/L    Potassium 3.7 3.5 - 5.2 mmol/L    Chloride 114 (H) 98 - 107 mmol/L    CO2 22.7 22.0 - 29.0 mmol/L    Calcium 8.5 (L) 8.6 - 10.5 mg/dL    BUN/Creatinine Ratio 12.7 7.0 - 25.0    Anion Gap 7.3 5.0 - 15.0 mmol/L    eGFR 86.8 >60.0 mL/min/1.73   Magnesium    Collection Time: 11/19/24  9:11 PM    Specimen: Blood   Result Value Ref Range    Magnesium 2.4 1.6 - 2.6 mg/dL   CBC Auto Differential    Collection Time: 11/19/24  9:11 PM    Specimen: Blood   Result Value Ref Range    WBC 5.34 3.40 - 10.80 10*3/mm3    RBC 3.07 (L) 3.77 - 5.28 10*6/mm3    Hemoglobin 10.1 (L) 12.0 - 15.9 g/dL    Hematocrit 30.9 (L) 34.0 - 46.6 %    .7 (H) 79.0 - 97.0 fL    MCH 32.9 26.6 - 33.0 pg    MCHC 32.7 31.5 - 35.7 g/dL    RDW 14.1 12.3 - 15.4 %    RDW-SD 50.9 37.0 - 54.0 fl    MPV 9.3 6.0 - 12.0 fL    Platelets 349 140 - 450 10*3/mm3    Neutrophil % 41.3 (L) 42.7 - 76.0 %    Lymphocyte % 46.3 (H) 19.6 - 45.3 %    Monocyte % 8.6 5.0 - 12.0 %    Eosinophil % 2.8 0.3 - 6.2 %    Basophil % 0.6 0.0 - 1.5 %    Immature Grans % 0.4 0.0 - 0.5 %    Neutrophils, Absolute 2.21 1.70 - 7.00 10*3/mm3    Lymphocytes, Absolute 2.47 0.70 - 3.10 10*3/mm3    Monocytes, Absolute 0.46 0.10 - 0.90 10*3/mm3     Eosinophils, Absolute 0.15 0.00 - 0.40 10*3/mm3    Basophils, Absolute 0.03 0.00 - 0.20 10*3/mm3    Immature Grans, Absolute 0.02 0.00 - 0.05 10*3/mm3    nRBC 0.0 0.0 - 0.2 /100 WBC   ECG 12 Lead QT Measurement    Collection Time: 11/19/24  9:24 PM   Result Value Ref Range    QT Interval 392 ms    QTC Interval 498 ms       Ordered the above labs and independently reviewed the results.        RADIOLOGY  XR Chest 1 View    Result Date: 11/19/2024  AP CHEST  HISTORY: Overdose  COMPARISON: 8/21/2022  FINDINGS: There are bands of atelectasis or scarring in the left lung base. Right lung is clear. Emphysematous changes are present. Heart size normal. There is a left chest port present with tip in the region of the cavoatrial junction. No appreciable pneumothorax. Lower cervical spine postop changes      Bands of atelectasis or scarring in the left lung base    This report was finalized on 11/19/2024 7:37 PM by Dr. Richard Maciel M.D on Workstation: [id number]       I ordered the above noted radiological studies. Reviewed by me and discussed with radiologist.  See dictation for official radiology interpretation.      PROCEDURES    Critical Care    Performed by: Canelo Rodriguez MD  Authorized by: Canelo Rodriguez MD    Critical care provider statement:     Critical care time (minutes): 45.    Critical care time was exclusive of:  Separately billable procedures and treating other patients    Critical care was necessary to treat or prevent imminent or life-threatening deterioration of the following conditions:  Toxidrome (Polypharmacy overdose)    Critical care was time spent personally by me on the following activities:  Blood draw for specimens, development of treatment plan with patient or surrogate, discussions with consultants, evaluation of patient's response to treatment, examination of patient, obtaining history from patient or surrogate, review of old charts, re-evaluation of patient's condition, pulse oximetry,  ordering and review of radiographic studies, ordering and review of laboratory studies and ordering and performing treatments and interventions    I assumed direction of critical care for this patient from another provider in my specialty: no      Care discussed with: admitting provider          MEDICATIONS GIVEN IN ER    Medications   sodium chloride 0.9 % flush 10 mL (has no administration in time range)   nitroglycerin (NITROSTAT) SL tablet 0.4 mg (has no administration in time range)   sodium chloride 0.9 % flush 10 mL (has no administration in time range)   sodium chloride 0.9 % flush 10 mL (has no administration in time range)   sodium chloride 0.9 % infusion 40 mL (has no administration in time range)   mupirocin (BACTROBAN) 2 % nasal ointment 1 Application (has no administration in time range)   sennosides-docusate (PERICOLACE) 8.6-50 MG per tablet 2 tablet (has no administration in time range)     And   polyethylene glycol (MIRALAX) packet 17 g (has no administration in time range)     And   bisacodyl (DULCOLAX) EC tablet 5 mg (has no administration in time range)     And   bisacodyl (DULCOLAX) suppository 10 mg (has no administration in time range)   Potassium Replacement - Follow Nurse / BPA Driven Protocol (has no administration in time range)   Magnesium Standard Dose Replacement - Follow Nurse / BPA Driven Protocol (has no administration in time range)   Phosphorus Replacement - Follow Nurse / BPA Driven Protocol (has no administration in time range)   Calcium Replacement - Follow Nurse / BPA Driven Protocol (has no administration in time range)   levETIRAcetam (KEPPRA) tablet 500 mg (has no administration in time range)   sodium chloride 0.9 % bolus 1,000 mL (0 mL Intravenous Stopped 11/19/24 2025)   potassium chloride 10 mEq in 100 mL IVPB (0 mEq Intravenous Stopped 11/19/24 2025)         All labs have been independently reviewed by me.  All radiology studies have been reviewed by me and I discussed  with radiologist dictating the report when indicated below.  All EKG's independently viewed and interpreted by me.  Discussion below represents my analysis of pertinent findings related to patient's condition, differential diagnosis, treatment plan and final disposition.        PROGRESS, DATA ANALYSIS, CONSULTS, AND MEDICAL DECISION MAKING    This is a patient that took an overdose of multiple medicines.  The main medicine that will likely be a factor would be Seroquel.  I did consult with poison control.  Will get a monitor closely.  It is basically supportive care.  There is no antidote.  This patient at the same time as I ask more inquisitive questions she states that you have already made your mind up and will not answer any more questions.  She is aware that she came by ambulance unaware of how she got here.  Will get a try to communicate with spouse or family to try to get a better history about all the events that happened prior to arrival here.  She will obviously need to be admitted to the hospital.      ED Course as of 11/19/24 2230   Tue Nov 19, 2024 1829 I talked with Forrest in poison center.  Again we do not know when she took this medicine.  It goes dark about 530.  She states that it was a light when she took the medicine.  I went through the dosing of the medicine that she took.  She is increased with risk for respiratory depression, anticholinergic side effects seizures and prolongation of QTc.  She took a significant dose of Seroquel.  She might need to be intubated to protect her airway.  She would also have CNS depression associated with this.  She states most of the effects from Seroquel usually occur within the first few hours especially with the first 1 to 2 hours if it is not extended release.  Does not appear that the patient's Seroquel that she consumed is extended release.  But sometimes absorption could be erratic.  She recommends the patient be monitored very closely and treat  supportively.  Benzodiazepines if she has seizures, correct electrolytes specifically magnesium potassium and calcium if they are abnormal and IV fluids. [MM]   1832 My own independent or potation of the EKG that was done at 6:24 PM reveals a rate of 99 it is sinus rhythm it is narrow complex normal axis and appreciate any acute injury pattern I see some nonspecific T wave changes QT at this time does not appear to be prolonged. [MM]   1838 On reevaluation of the patient right at this time overall her status is remained unchanged.  Heart rate is 90s to low 100s blood pressure is normal.  We do have her on 2 L of oxygen her oxygen saturation is 95 to 96%.  She tells me that she is not  but Taye is her ex-.  She states that all [facility] is her daughter.  She states that Taye was not there.  Organ to try to communicate again with Taye to see what he knows. [MM]   1842 I was able to obtain an old EKG from 8/22/2022 and the EKGs look very similar QTs also looks similar. [MM]   1906 I have just reevaluated this patient again.  Heart rate is in the low 100s.  Blood pressure is normal.  Oxygen saturation 96% on 2 L.  Overall her neurologic status and respiratory status is remained the same.  She is alert and oriented.  She is complaining of a dry mouth.  No change from initial presentation. [MM]   1906 Magnesium: 2.2 [MM]   1906 Salicylate: <0.3 [MM]   1906 Ethanol %: <0.010 [MM]   1906 Acetaminophen: <5.0 [MM]   1906 Potassium(!): 3.3 [MM]   1907 Calcium: 8.8 [MM]   1907 Potassium(!): 3.3  I have ordered 10 mill equivalents of KCl IV. [MM]   1907 Hemoglobin(!): 10.1  Chronic anemia no significant change. [MM]   1946 The nurse got in touch with her ex  Gregory.  He states I do not want anything to do with her I do not know anything that happened to her.  He cannot provide any helpful information. [MM]   1946 I reevaluated the patient again.  Heart rate is [MM]   1947  in the low 100s blood pressure is  normal O2 sat is 95% on 2 L.  She seems a little more tired than initial presentation but no significant change. [MM]   2014 I did discuss the case with Kinza who is the midlevel provider in the ICU.  Dr. Somers is the attending.  Informed her of the patient's presenting symptoms and the history that I obtained.  Informed her of the limitations of the exact history of what happened.  Also informed of the conversation I had with the poison center.  She agrees to admit.  All questions answered [MM]   2015 On my reevaluation patient's heart rate is still in the low 100s blood pressure is normal oxygen saturation 96% on 2 L.  She seems a little more tired than my initial evaluation.  But she is alert and oriented x 3 she is answering questions.  She again appears a little slow.  I would think if she did not take long-acting Seroquel we would see more symptoms if she took the amount that she mention. [MM]   2032 Per the nurses evaluation she seemed more tired and sleepy and I went and just reevaluated the patient again now.  Patient is more sleepy.  Her speech is a little more slurred.  But she opens her eyes in response to voice.  She tells me her name she has more slurred speech than on presentation.  She follows commands and does move her arms and legs.  Vital signs have remained unchanged.  She definitely does not need any airway intervention at this time.  She is protecting her airway again responds to voice and follows simple commands. [MM]      ED Course User Index  [MM] Canelo Rodriguez MD       AS OF 22:30 EST VITALS:    BP - 129/91  HR - 98  TEMP - 97.6 °F (36.4 °C) (Oral)  02 SATS - 98%    SOCIAL DETERMINANTS OF HEALTH THAT IMPACT OR LIMIT CARE (For example..Homelessness,safe discharge, inability to obtain care, follow up, or prescriptions):      DIAGNOSIS  Final diagnoses:   Intentional drug overdose, initial encounter: Polypharmacy overdose including Seroquel, Remeron, Neurontin   Suicide attempt  "        DISPOSITION  Patient will be admitted to the ICU           DICTATED UTILIZING DRAGON DICTATION    Note Disclaimer: At [facility], we believe that sharing information builds trust and better relationships. You are receiving this note because you recently visited [facility]. It is possible you will see health information before a provider has talked with you about it. This kind of information can be easy to misunderstand. To help you fully understand what it means for your health, we urge you to discuss this note with your provider.       Canelo Rodriguez MD  11/19/24 2230      Electronically signed by Caneol Rodriguez MD at 11/19/24 2230       Shivani Lambert, RN at 11/19/24 1740          Patient arrives via [facility] for intentional drug overdose. Patient took 8 600mg of Seroquel. Patient is an SI. Alert and oriented x4.    Electronically signed by Shivani Lambert, RN at 11/19/24 1748          Physician Progress Notes (last 48 hours)        Chacho Van MD at 11/20/24 0847                [facility]         Dr Chacho Van   LOS: 1 day   Patient Care Team:  System, Provider Not In as PCP - General    Chief Complaint: Intentional overdose polypharmacy psych issues multiple issues as listed below    Interval History: She is sleepy difficult to arouse.  Currently on nasal cannula oxygen.  Currently protecting her airway.    REVIEW OF SYSTEMS:   Unreliable with the current condition    Ventilator/Non-Invasive Ventilation Settings (From admission, onward)      None              Vital Signs  Temp:  [97 °F (36.1 °C)-97.6 °F (36.4 °C)] 97.3 °F (36.3 °C)  Heart Rate:  [] 76  Resp:  [12-20] 13  BP: ()/(67-91) 117/89    Intake/Output Summary (Last 24 hours) at 11/20/2024 0847  Last data filed at 11/20/2024 0430  Gross per 24 hour   Intake 1100 ml   Output 1450 ml   Net -350 ml     Flowsheet Rows      Flowsheet Row First Filed Value   Admission Height 172.7 cm (67.99\") " Documented at 11/19/2024 1744   Admission Weight 104 kg (229 lb 4.5 oz) Documented at 11/19/2024 1744            Physical Exam:  Patient is examined using the personal protective equipment as per guidelines from infection control for this particular patient as enacted.  Hand hygiene was performed before and after patient interaction.   General Appearance:     sleepy moans and groans.  ENT normocephalic atraumatic  Neck midline trachea, no thyromegaly   Lungs:   Diminished breath sounds bilaterally    Heart:    Regular rhythm and normal rate, normal S1 and S2, no            murmur, no gallop, no rub, no click   Chest Wall:    No abnormalities observed   Abdomen:     Normal bowel sounds, no masses, no organomegaly, soft        nontender, nondistended, no guarding, no rebound                tenderness   Extremities:    no edema, no cyanosis, no             redness  CNS Sleepy but moving all extremities well.  Currently not following commands for me.  Skin no rashes no nodules  Musculoskeletal no cyanosis no clubbing normal range of motion     Results Review:        Results from last 7 days   Lab Units 11/20/24 0415 11/19/24 2111 11/19/24 1817   SODIUM mmol/L 145 144 141   POTASSIUM mmol/L 3.1* 3.7 3.3*   CHLORIDE mmol/L 116* 114* 109*   CO2 mmol/L 21.0* 22.7 21.0*   BUN mg/dL 10 10 11   CREATININE mg/dL 0.77 0.79 0.92   GLUCOSE mg/dL 84 99 91   CALCIUM mg/dL 7.6* 8.5* 8.8         Results from last 7 days   Lab Units 11/20/24 0415 11/19/24 2111 11/19/24 1817   WBC 10*3/mm3 3.92 5.34 5.76   HEMOGLOBIN g/dL 9.2* 10.1* 10.1*   HEMATOCRIT % 28.2* 30.9* 31.0*   PLATELETS 10*3/mm3 292 349 362     Results from last 7 days   Lab Units 11/19/24  1817   INR  1.08         Results from last 7 days   Lab Units 11/20/24  0415   MAGNESIUM mg/dL 2.1               I reviewed the patient's new clinical results.  I personally viewed and interpreted the patient's chest x-ray.        Medication Review:   levETIRAcetam, 500 mg,  Intravenous, Q12H  mupirocin, 1 Application, Each Nare, BID  potassium chloride, 10 mEq, Intravenous, Q1H  senna-docusate sodium, 2 tablet, Oral, BID  sodium chloride, 10 mL, Intravenous, Q12H             ASSESSMENT:   Intentional overdose  Suicidal ideation  Seizure disorder  Depression  Psychosis  Mood disorder  History of depression/psychosis    PLAN:  Events noted chart reviewed  Intentional overdose fourth 1 with Seroquel gabapentin and mirtazapine.  Monitor QTc.  Cardiac rhythm currently stable.  Still quite sleepy needs to be watched with close monitoring of the airway  Sitter in place.  Psych evaluation  Resume Keppra at a lower dose to avoid sedation  ICU core measures  Keep with the ICU until mental status improves      Chacho Van MD  11/20/24  08:47 EST            Electronically signed by Chacho Van MD at 11/20/24 0850          Consult Notes (last 48 hours)        Srinivas West III, MD at 11/20/24 1235        Consult Orders    1. Inpatient Psychiatrist Consult [[id number]] ordered by Liz Perales APRN at 11/19/24 7402                 IDENTIFYING INFORMATION: The patient is a 59-year-old white female admitted to the intensive outpatient program following an intentional overdose of Seroquel    CHIEF COMPLAINT: None given    INFORMANT: Patient and chart    RELIABILITY: Limited    HISTORY OF PRESENT ILLNESS: The patient is a 59-year-old white female admitted following an intentional ingestion of Seroquel.  This is apparently her third suicide attempt in 3 months.  Additionally, the patient was seen twice in 2022 at this facility by this physician after similar such episodes.  The patient has a history of alcohol abuse but her drug screen was negative for alcohol on admission it was positive for benzodiazepines.  The patient carries a diagnosis of alcohol use disorder, bipolar disorder unspecified and certainly suffers from borderline personality disorder the patient was just  "discharged from [facility] on 11/13/2022.  The patient reports that she had an altercation with her daughter regarding use of his cell phone and she stated that she wished to go to the [facility] to get \"the treatment that I need\".  She has also been hospitalized at the past at [facility].  The patient reports that after her daughter refused to allow her to use her cell phone she took an overdose of Seroquel and some and EMS to her home.  She clearly intended to self rescue.  When seen today the patient is reporting no active suicidal ideation and admits to the manipulative nature of her act, but continues to demand that she needs \"help\" and is insisting on admission to the [facility].    PAST PSYCHIATRIC HISTORY: Significant for multiple previous suicide attempts, alcohol dependence and a reported history of bipolar disorder.  Her current psychiatric medications include Prozac Seroquel and Vistaril     PAST MEDICAL HISTORY: Significant for history of lung adenocarcinoma and epilepsy    MEDICATIONS:   Current Facility-Administered Medications   Medication Dose Route Frequency Provider Last Rate Last Admin    sennosides-docusate (PERICOLACE) 8.6-50 MG per tablet 2 tablet  2 tablet Oral BID Liz Perales APRN        And    polyethylene glycol (MIRALAX) packet 17 g  17 g Oral Daily PRN Liz Perales APRN        And    bisacodyl (DULCOLAX) EC tablet 5 mg  5 mg Oral Daily PRN Liz Perales APRN        And    bisacodyl (DULCOLAX) suppository 10 mg  10 mg Rectal Daily PRN Liz Perales APRN        Calcium Replacement - Follow Nurse / BPA Driven Protocol   Not Applicable PRN Liz Perales APRN        levETIRAcetam (KEPPRA) injection 500 mg  500 mg Intravenous Q12H Liz Perales APRN   500 mg at 11/20/24 0815    Magnesium Standard Dose Replacement - Follow Nurse / BPA Driven Protocol   Not Applicable PRLiz Brush APRN        mupirocin (BACTROBAN) 2 % nasal ointment 1 " Application  1 Application Each Nare BID Liz Perales APRN   1 Application at 11/20/24 0815    nitroglycerin (NITROSTAT) SL tablet 0.4 mg  0.4 mg Sublingual Q5 Min PRN Liz Perales APRN        Phosphorus Replacement - Follow Nurse / BPA Driven Protocol   Not Applicable PRN Liz Perales APRN        Potassium Replacement - Follow Nurse / BPA Driven Protocol   Not Applicable PRN Liz Perales APRN        sodium chloride 0.9 % flush 10 mL  10 mL Intravenous PRN Canelo Rodriguez MD        sodium chloride 0.9 % flush 10 mL  10 mL Intravenous Q12H Liz Perales APRN   10 mL at 11/20/24 0812    sodium chloride 0.9 % flush 10 mL  10 mL Intravenous PRN Liz Perales APRN        sodium chloride 0.9 % infusion 40 mL  40 mL Intravenous PRN Liz Perales APRN             ALLERGIES: Codeine, fentanyl, naltrexone, latex    FAMILY HISTORY: Noncontributory    SOCIAL HISTORY: Patient is a former alcoholic.  She reports that she has been sober since 2016, though when seen in 2022 she residing in a sober living facility    MENTAL STATUS EXAM: The patient is an obese white female appearing her stated age.  Of note is bizarrely dyed pink hair.  She is awake alert and oriented ulcers.  Her mood is irritable her affect congruent.  Speech is generally relevant and coherent.  There are no deficits memory or cognition noted.  Intelligence is judged to be in the low average range based on fund of knowledge, the patient is less than optimally cooperative with interview.  She is ambivalent regarding suicidal ideations at this time but does admit to the metabolic nature of her recent attempt.  She denies any psychotic symptoms.  Her judgment and sight appear to be significantly impaired.    ASSETS/LIABILITIES: To be assessed    DIAGNOSTIC IMPRESSION: Dysthymic disorder, adjustment disorder with depressed mood, borderline personality disorder (primary diagnosis), alcohol use disorder, seizure disorder by history, status post  "polypharmacy ingestion    PLAN: I believe the circumstances of the patient's admission leaves with no choice but to seek an inpatient psychiatric bed given her multiple recent suicide attempts.  The patient is notably vague when queried as to what sort of \"help\" she seeks\". in the meantime I would recommend continuation of her sitter and suicide precautions and will not restart previously prescribed medications given her recent overdose.    Electronically signed by Srinivas West III, MD at 11/20/24 1243       "

## 2024-11-21 NOTE — NURSING NOTE
Attempted to call Taye Mehta to notify of Pt transfer to [facility]. No answer. Will attempt again.

## 2024-11-21 NOTE — PROGRESS NOTES
The patient has been accepted for inpatient psychiatric care at the [facility] and will transfer there today.

## 2024-11-21 NOTE — CONSULTS
Access and psychiatrist consulted r/t SI s/p intentional overdose. Patient intentionally overdosed on seroquel; this is patient's 3rd suicide attempt in 3 months. Patient is on 72 hour hold.     58 yo female evaluated in room 371. Patient appears alert and oriented times 4. Hair is dyed bright pink.Patient currently in 4 point violent restraints due to attempting to elope unit. Sitter in room. Security was called to bedside twice tonight to assist. Patient was agitated, verbally abusive and staff was unable to redirect.     Introduced myself to patient and role in her care. Patient agreeable to speaking with me but appears irritable and is continuously interrupting my questions. Patient appears to be blaming her daughter for the overdose stating she wouldn't let her use her cell phone. Patient also stating she was restrained due to wanting her belongings in the room. Any attempts on my part to provide patient with knowledge of actual events that led to security being called twice and restraint application are met with resistance. Patient's judgment and insight are significantly impaired. Strong axis 2 traits noted.    Per previous encounters/epic, patient diagnosed with dysthymic disorder, adjustment disorder with depressed mood, borderline personality disorder, alcohol use disorder.     Patient will need inpatient psychiatric admission once medically cleared. Spoke with patient's primary RN outside of room and she states patient currently will remain in ICU due to needing to monitor QT interval. Patient has been obtunded until approximately 2300 tonight and became agitated and attempting to elope.     Access and psychiatrist following.

## 2024-11-21 NOTE — DISCHARGE SUMMARY
PHYSICIAN DISCHARGE SUMMARY                                                                        [facility]    Patient Identification:  Name: Noelle Mehta  Age: 59 y.o.  Sex: female  :  1965  MRN: [id number]  Primary Care Physician: System, Provider Not In    Admit date: 2024  Discharge date and time: No discharge date for patient encounter.   Discharged Condition: stable    Discharge Diagnoses:  Intentional overdose   Intentional overdose  Suicidal ideation  Seizure disorder  Depression  Psychosis  Mood disorder  History of depression/psychosis    Hospital Course: Noelle Mehta presented to [facility]    Patient admitted with intentional overdose of Seroquel gabapentin and mirtazapine.  Initial QTc was prolonged and then it has corrected.  She is clinically stable now.  Medically she has been cleared to transfer to inpatient psych unit.  Psychiatry currently following.  She is being discharged to psychiatry inpatient care/[place].  Psych has been following her.  Consults:          Significant Diagnostic Studies:   Imaging Results (All)       Procedure Component Value Units Date/Time    XR Chest 1 View [[id number]] Collected: 24     Updated: 24    Narrative:      AP CHEST     HISTORY: Overdose     COMPARISON: 2022     FINDINGS: There are bands of atelectasis or scarring in the left lung  base. Right lung is clear. Emphysematous changes are present. Heart size  normal. There is a left chest port present with tip in the region of the  cavoatrial junction. No appreciable pneumothorax. Lower cervical spine  postop changes       Impression:      Bands of atelectasis or scarring in the left lung base           This report was finalized on 2024 7:37 PM by Dr. Richard Maciel M.D on Workstation: [id number]               Discharge Exam:  Alert and oriented x 4, in NAD  Supple neck, midline trach  RRR, no m/r/g, no edema  Clear bilaterally,  no wheezing, nonlabored  No clubbing or cyanosis     Disposition:  Inpatient psych facility    Patient Instructions:    Follow-up Information       System, Provider Not In .    Contact information:  [facility] [address]                           Discharge Orders  Noelle Mehta (MRN [id number])      (none)                Discharge Medications        Continue These Medications        Instructions Start Date   folic acid 1 MG tablet  Commonly known as: FOLVITE   1 mg, Oral, Daily      levETIRAcetam 1000 MG tablet  Commonly known as: KEPPRA   1 tablet, 2 Times Daily      nicotine 21 MG/24HR patch  Commonly known as: NICODERM CQ   1 patch, Every 24 Hours      omeprazole 40 MG capsule  Commonly known as: priLOSEC   40 mg, Daily      PREMPRO PO   Oral      vitamin D 1.25 MG (74179 UT) capsule capsule  Commonly known as: ERGOCALCIFEROL   50,000 Units, Every 7 Days             Stop These Medications      albuterol sulfate  (90 Base) MCG/ACT inhaler  Commonly known as: PROVENTIL HFA;VENTOLIN HFA;PROAIR HFA     atomoxetine 60 MG capsule  Commonly known as: STRATTERA     gabapentin 300 MG capsule  Commonly known as: NEURONTIN     hydrOXYzine pamoate 50 MG capsule  Commonly known as: VISTARIL     mirtazapine 15 MG tablet  Commonly known as: REMERON     NAPROXEN PO     PROZAC PO     QUEtiapine 300 MG tablet  Commonly known as: SEROquel     topiramate 100 MG tablet  Commonly known as: TOPAMAX              Total time spent discharging patient including evaluation, medication reconciliation, arranging follow up, and post hospitalization instructions and education total time exceeds 30 minutes.    Signed:  Chacho Van MD  11/21/2024  08:55 EST

## 2024-11-21 NOTE — SIGNIFICANT NOTE
Violent or Self-Destructive Behavioral Restraint Provider Face to Face         Patient Name: Noelle Mehta  : [birth year]  MRN: [id number]     Date of Assessment: 24     Face to Face Evaluation:   I have completed a face to face evaluation of the patient for dangerous or threatening behavior within 1 hour of the restraint placement.      Time restraints were placed: 004     Time patient was evaluated: 0105     Assessment of Patient's Immediate Situation: combative, aggressive, impulsive     Type of intervention: Mechanical restraint     Assessment of Patient's Reaction to Intervention: appears safe, appears comfortable, and appears to be tolerating restraint/seclusion without distress or adverse response     Evaluation of Patient's Medical and Behavioral Conditions: Dysthymic disorder, adjustment disorder with depressed mood, borderline personality disorder (primary diagnosis), alcohol use disorder, seizure disorder by history, status post polypharmacy ingestion         I have reviewed all available relevant medical and behavioral history for the patient including:     Complete Review of Systems: Yes  Review of Patient's Medical History: Yes  Drugs and/or Alcohol: Yes  Medications: Yes  Recent Labs and Diagnostics: Yes  Allergies: Yes     Restraints/Seclusion Continued or Discontinued: Continue     Discontinued Indications: N/A     Called urgently to bedside for patient being combative, out of bed, unable to be redirected.  Patient demanding her belongings.  She was reeducated by staff that she is unable to have her prolong that she is on a suicide precaution and 72-hour hold for safety.  She would not be redirected back into bed.  Security assisted patient into bed and patient was placed in violent restraints.  Reviewed note from psychiatrist, he recommended to not restart previously prescribed medications given her recent overdose.  Given recent Seroquel overdose, hesitant to start any antipsychotic medications  that could potentially worsen side effects of her overdose.      Liz Perales, APRN  11/21/24  01:06 EST

## 2024-11-21 NOTE — CASE MANAGEMENT/SOCIAL WORK
Looking for inpatient psych bed for pt-    Spoke to Sarai at [facility]- info has been sent for review- awaiting a response.

## 2024-11-22 LAB
QT INTERVAL: 374 MS
QT INTERVAL: 392 MS
QT INTERVAL: 392 MS
QT INTERVAL: 456 MS
QTC INTERVAL: 461 MS
QTC INTERVAL: 480 MS
QTC INTERVAL: 498 MS
QTC INTERVAL: 517 MS

## 2024-11-25 NOTE — PAYOR COMM NOTE
"Howard Martinez (59 y.o. Female)     PLEASE SEE ATTACHED FOR DC NOTICE    REF #[id number]     THANK YOU  JUSTINE CABELLO RN/UM DEPT  [facility]   [phone]  [phone]        Date of Birth   1965    Social Security Number       Address   [address] [place] [address]    Home Phone   [phone]    MRN   [id number]       Protestant   Shinto    Marital Status                               Admission Date   11/19/24    Admission Type   Emergency    Admitting Provider   Lauren Somers MD    Attending Provider       Department, Room/Bed   [facility] INTENSIVE CARE, I371/1       Discharge Date   11/21/2024    Discharge Disposition   [facility] (DC - External)    Discharge Destination                                 Attending Provider: (none)   Allergies: Codeine, Fentanyl, Lactose Intolerance (Gi), Naltrexone, Latex    Isolation: None   Infection: None   Code Status: Prior    Ht: 167.6 cm (66\")   Wt: 91.4 kg (201 lb 8 oz)    Admission Cmt: None   Principal Problem: Intentional overdose [T50.902A]                   Active Insurance as of 11/19/2024       Primary Coverage       Payor Plan Insurance Group Employer/Plan Group    [facility] BY AMADOU [id number]       Payor Plan Address Payor Plan Phone Number Payor Plan Fax Number Effective Dates    [address] [address]   1/1/2021 - None Entered    [facility] [phone]         Subscriber Name Subscriber Birth Date Member ID       HOWARD MARTINEZ 1965 [id number]                     Emergency Contacts        (Rel.) Home Phone Work Phone Mobile Phone    TysonTaey ([employer]) (Friend) [phone] -- --              [employer]: NPI [id number]  Tax ID [id number]     Discharge Summary        Chacho Van MD at 11/21/24 0855            PHYSICIAN DISCHARGE SUMMARY                                                                        [facility]" [place]    Patient Identification:  Name: Noelle Mehta  Age: 59 y.o.  Sex: female  :  1965  MRN: [id number]  Primary Care Physician: System, Provider Not In    Admit date: 2024  Discharge date and time: No discharge date for patient encounter.   Discharged Condition: stable    Discharge Diagnoses:  Intentional overdose   Intentional overdose  Suicidal ideation  Seizure disorder  Depression  Psychosis  Mood disorder  History of depression/psychosis    Hospital Course: Noelle Mehta presented to [facility]    Patient admitted with intentional overdose of Seroquel gabapentin and mirtazapine.  Initial QTc was prolonged and then it has corrected.  She is clinically stable now.  Medically she has been cleared to transfer to inpatient psych unit.  Psychiatry currently following.  She is being discharged to psychiatry inpatient care/[place].  Psych has been following her.  Consults:          Significant Diagnostic Studies:   Imaging Results (All)       Procedure Component Value Units Date/Time    XR Chest 1 View [[id number]] Collected: 24     Updated: 24    Narrative:      AP CHEST     HISTORY: Overdose     COMPARISON: 2022     FINDINGS: There are bands of atelectasis or scarring in the left lung  base. Right lung is clear. Emphysematous changes are present. Heart size  normal. There is a left chest port present with tip in the region of the  cavoatrial junction. No appreciable pneumothorax. Lower cervical spine  postop changes       Impression:      Bands of atelectasis or scarring in the left lung base           This report was finalized on 2024 7:37 PM by Dr. Richard Maciel M.D on Workstation: [id number]               Discharge Exam:  Alert and oriented x 4, in NAD  Supple neck, midline trach  RRR, no m/r/g, no edema  Clear bilaterally, no wheezing, nonlabored  No clubbing or cyanosis     Disposition:  Inpatient psych facility    Patient Instructions:     Follow-up Information       System, Provider Not In .    Contact information:  [facility] [address]                           Discharge Orders  Noelle Mehta (MRN [id number])      (none)                Discharge Medications        Continue These Medications        Instructions Start Date   folic acid 1 MG tablet  Commonly known as: FOLVITE   1 mg, Oral, Daily      levETIRAcetam 1000 MG tablet  Commonly known as: KEPPRA   1 tablet, 2 Times Daily      nicotine 21 MG/24HR patch  Commonly known as: NICODERM CQ   1 patch, Every 24 Hours      omeprazole 40 MG capsule  Commonly known as: priLOSEC   40 mg, Daily      PREMPRO PO   Oral      vitamin D 1.25 MG (56912 UT) capsule capsule  Commonly known as: ERGOCALCIFEROL   50,000 Units, Every 7 Days             Stop These Medications      albuterol sulfate  (90 Base) MCG/ACT inhaler  Commonly known as: PROVENTIL HFA;VENTOLIN HFA;PROAIR HFA     atomoxetine 60 MG capsule  Commonly known as: STRATTERA     gabapentin 300 MG capsule  Commonly known as: NEURONTIN     hydrOXYzine pamoate 50 MG capsule  Commonly known as: VISTARIL     mirtazapine 15 MG tablet  Commonly known as: REMERON     NAPROXEN PO     PROZAC PO     QUEtiapine 300 MG tablet  Commonly known as: SEROquel     topiramate 100 MG tablet  Commonly known as: TOPAMAX              Total time spent discharging patient including evaluation, medication reconciliation, arranging follow up, and post hospitalization instructions and education total time exceeds 30 minutes.    Signed:  Chacho Van MD  11/21/2024  08:55 EST    Electronically signed by Chacho Van MD at 11/21/24 0897

## 2024-12-03 ENCOUNTER — HOSPITAL ENCOUNTER (EMERGENCY)
Facility: HOSPITAL | Age: 59
Discharge: HOME | End: 2024-12-03
Attending: EMERGENCY MEDICINE | Admitting: EMERGENCY MEDICINE
Payer: COMMERCIAL

## 2024-12-03 ENCOUNTER — APPOINTMENT (OUTPATIENT)
Dept: GENERAL RADIOLOGY | Facility: HOSPITAL | Age: 59
End: 2024-12-03
Payer: COMMERCIAL

## 2024-12-03 ENCOUNTER — APPOINTMENT (OUTPATIENT)
Dept: CT IMAGING | Facility: HOSPITAL | Age: 59
End: 2024-12-03
Payer: COMMERCIAL

## 2024-12-03 VITALS
SYSTOLIC BLOOD PRESSURE: 132 MMHG | HEIGHT: 66 IN | HEART RATE: 88 BPM | TEMPERATURE: 97.8 F | DIASTOLIC BLOOD PRESSURE: 78 MMHG | BODY MASS INDEX: 32.52 KG/M2 | OXYGEN SATURATION: 92 % | RESPIRATION RATE: 20 BRPM

## 2024-12-03 DIAGNOSIS — Z87.898 HISTORY OF SEIZURE: ICD-10-CM

## 2024-12-03 DIAGNOSIS — R20.2 PARESTHESIA: Primary | ICD-10-CM

## 2024-12-03 DIAGNOSIS — R07.89 ATYPICAL CHEST PAIN: ICD-10-CM

## 2024-12-03 LAB
ALBUMIN SERPL-MCNC: 3.9 G/DL (ref 3.5–5.2)
ALBUMIN/GLOB SERPL: 1.5 G/DL
ALP SERPL-CCNC: 76 U/L (ref 39–117)
ALT SERPL W P-5'-P-CCNC: 12 U/L (ref 1–33)
AMPHET+METHAMPHET UR QL: POSITIVE
ANION GAP SERPL CALCULATED.3IONS-SCNC: 11.5 MMOL/L (ref 5–15)
AST SERPL-CCNC: 22 U/L (ref 1–32)
BARBITURATES UR QL SCN: NEGATIVE
BASOPHILS # BLD AUTO: 0.04 10*3/MM3 (ref 0–0.2)
BASOPHILS NFR BLD AUTO: 0.8 % (ref 0–1.5)
BENZODIAZ UR QL SCN: NEGATIVE
BILIRUB SERPL-MCNC: 0.2 MG/DL (ref 0–1.2)
BUN SERPL-MCNC: 13 MG/DL (ref 6–20)
BUN/CREAT SERPL: 12.9 (ref 7–25)
CALCIUM SPEC-SCNC: 9.3 MG/DL (ref 8.6–10.5)
CANNABINOIDS SERPL QL: NEGATIVE
CHLORIDE SERPL-SCNC: 106 MMOL/L (ref 98–107)
CO2 SERPL-SCNC: 19.5 MMOL/L (ref 22–29)
COCAINE UR QL: NEGATIVE
CREAT SERPL-MCNC: 1.01 MG/DL (ref 0.57–1)
DEPRECATED RDW RBC AUTO: 49.9 FL (ref 37–54)
EGFRCR SERPLBLD CKD-EPI 2021: 64.7 ML/MIN/1.73
EOSINOPHIL # BLD AUTO: 0.15 10*3/MM3 (ref 0–0.4)
EOSINOPHIL NFR BLD AUTO: 2.9 % (ref 0.3–6.2)
ERYTHROCYTE [DISTWIDTH] IN BLOOD BY AUTOMATED COUNT: 13.6 % (ref 12.3–15.4)
ETHANOL BLD-MCNC: <10 MG/DL (ref 0–10)
ETHANOL UR QL: <0.01 %
FENTANYL UR-MCNC: NEGATIVE NG/ML
GEN 5 2HR TROPONIN T REFLEX: <6 NG/L
GLOBULIN UR ELPH-MCNC: 2.6 GM/DL
GLUCOSE SERPL-MCNC: 96 MG/DL (ref 65–99)
HCT VFR BLD AUTO: 36.6 % (ref 34–46.6)
HGB BLD-MCNC: 11.5 G/DL (ref 12–15.9)
HOLD SPECIMEN: NORMAL
HOLD SPECIMEN: NORMAL
IMM GRANULOCYTES # BLD AUTO: 0.01 10*3/MM3 (ref 0–0.05)
IMM GRANULOCYTES NFR BLD AUTO: 0.2 % (ref 0–0.5)
LYMPHOCYTES # BLD AUTO: 2.24 10*3/MM3 (ref 0.7–3.1)
LYMPHOCYTES NFR BLD AUTO: 43.6 % (ref 19.6–45.3)
MCH RBC QN AUTO: 31.3 PG (ref 26.6–33)
MCHC RBC AUTO-ENTMCNC: 31.4 G/DL (ref 31.5–35.7)
MCV RBC AUTO: 99.7 FL (ref 79–97)
METHADONE UR QL SCN: NEGATIVE
MONOCYTES # BLD AUTO: 0.6 10*3/MM3 (ref 0.1–0.9)
MONOCYTES NFR BLD AUTO: 11.7 % (ref 5–12)
NEUTROPHILS NFR BLD AUTO: 2.1 10*3/MM3 (ref 1.7–7)
NEUTROPHILS NFR BLD AUTO: 40.8 % (ref 42.7–76)
NRBC BLD AUTO-RTO: 0 /100 WBC (ref 0–0.2)
OPIATES UR QL: NEGATIVE
OXYCODONE UR QL SCN: NEGATIVE
PLATELET # BLD AUTO: 216 10*3/MM3 (ref 140–450)
PMV BLD AUTO: 9.9 FL (ref 6–12)
POTASSIUM SERPL-SCNC: 3.5 MMOL/L (ref 3.5–5.2)
PROT SERPL-MCNC: 6.5 G/DL (ref 6–8.5)
QT INTERVAL: 355 MS
QTC INTERVAL: 444 MS
RBC # BLD AUTO: 3.67 10*6/MM3 (ref 3.77–5.28)
SODIUM SERPL-SCNC: 137 MMOL/L (ref 136–145)
TROPONIN T DELTA: NORMAL
TROPONIN T SERPL HS-MCNC: <6 NG/L
WBC NRBC COR # BLD AUTO: 5.14 10*3/MM3 (ref 3.4–10.8)
WHOLE BLOOD HOLD COAG: NORMAL
WHOLE BLOOD HOLD SPECIMEN: NORMAL

## 2024-12-03 PROCEDURE — 80307 DRUG TEST PRSMV CHEM ANLYZR: CPT | Performed by: EMERGENCY MEDICINE

## 2024-12-03 PROCEDURE — 93005 ELECTROCARDIOGRAM TRACING: CPT | Performed by: EMERGENCY MEDICINE

## 2024-12-03 PROCEDURE — 71045 X-RAY EXAM CHEST 1 VIEW: CPT

## 2024-12-03 PROCEDURE — 84484 ASSAY OF TROPONIN QUANT: CPT | Performed by: EMERGENCY MEDICINE

## 2024-12-03 PROCEDURE — 80053 COMPREHEN METABOLIC PANEL: CPT | Performed by: EMERGENCY MEDICINE

## 2024-12-03 PROCEDURE — 93005 ELECTROCARDIOGRAM TRACING: CPT

## 2024-12-03 PROCEDURE — 82077 ASSAY SPEC XCP UR&BREATH IA: CPT | Performed by: EMERGENCY MEDICINE

## 2024-12-03 PROCEDURE — 99284 EMERGENCY DEPT VISIT MOD MDM: CPT

## 2024-12-03 PROCEDURE — 85025 COMPLETE CBC W/AUTO DIFF WBC: CPT | Performed by: EMERGENCY MEDICINE

## 2024-12-03 PROCEDURE — 36415 COLL VENOUS BLD VENIPUNCTURE: CPT

## 2024-12-03 PROCEDURE — 70450 CT HEAD/BRAIN W/O DYE: CPT

## 2024-12-03 RX ORDER — ASPIRIN 325 MG
325 TABLET ORAL ONCE
Status: DISCONTINUED | OUTPATIENT
Start: 2024-12-03 | End: 2024-12-03 | Stop reason: HOSPADM

## 2024-12-03 RX ORDER — SODIUM CHLORIDE 0.9 % (FLUSH) 0.9 %
10 SYRINGE (ML) INJECTION AS NEEDED
Status: DISCONTINUED | OUTPATIENT
Start: 2024-12-03 | End: 2024-12-03 | Stop reason: HOSPADM

## 2024-12-03 NOTE — ED PROVIDER NOTES
EMERGENCY DEPARTMENT ENCOUNTER  Room Number:  11/11  PCP: System, Provider Not In  Independent Historians: Patient      HPI:  Chief Complaint: had concerns including Chest Pain.      A complete HPI/ROS/PMH/PSH/SH/FH are unobtainable due to: Nothing      Context: Noelle Mehta is a 59 y.o. female with a medical history of epilepsy, alcohol abuse, seizures, who presents to the ED c/o acute episode of chest discomfort that occurred at her home prior to arrival.  She states she also had tingling on left side of her face, and her left arm naga up towards her body.  No LOC.  She does have a hx of seizures.  She reports symptoms have now resolved.  She states that she was recently hospitalized at the [facility] after an overdose of Seroquel.  She had been on Seroquel for years but it has since been discontinued.    Review of prior external notes (non-ED) -and- Review of prior external test results outside of this encounter: See ED course below for summary of recent hospitalization        PAST MEDICAL HISTORY  Active Ambulatory Problems     Diagnosis Date Noted    Intentional overdose 11/19/2024     Resolved Ambulatory Problems     Diagnosis Date Noted    No Resolved Ambulatory Problems     Past Medical History:   Diagnosis Date    Epilepsy     H/O ETOH abuse     Seizures          PAST SURGICAL HISTORY  Past Surgical History:   Procedure Laterality Date    BACK SURGERY           FAMILY HISTORY  No family history on file.      SOCIAL HISTORY  Social History     Socioeconomic History    Marital status:    Tobacco Use    Smoking status: Every Day     Types: Cigarettes    Smokeless tobacco: Never   Vaping Use    Vaping status: Never Used   Substance and Sexual Activity    Alcohol use: No     Comment: sober since 1/2018    Drug use: No    Sexual activity: Defer         ALLERGIES  Codeine, Fentanyl, Lactose intolerance (gi), Naltrexone, and Latex      REVIEW OF SYSTEMS  Review of all 14 systems is negative other than stated in  the HPI above.      PHYSICAL EXAM    I have reviewed the triage vital signs and nursing notes.    ED Triage Vitals   Temp Heart Rate Resp BP SpO2   12/03/24 0143 12/03/24 0141 12/03/24 0141 12/03/24 0141 12/03/24 0141   97.8 °F (36.6 °C) 107 20 132/81 98 %      Temp src Heart Rate Source Patient Position BP Location FiO2 (%)   12/03/24 0143 -- -- -- --   Tympanic             GENERAL: awake and alert, well-appearing, no acute distress  HENT: Normocephalic, atraumatic, no tongue trauma  EYES: no scleral icterus, EOMI  CV: regular rhythm, regular rate, no murmur  RESPIRATORY: normal effort  ABDOMEN: soft, nontender throughout  MUSCULOSKELETAL: no deformity  NEURO: alert, moves all extremities, follows commands, cranial nerves II through XII intact, speech fluent and clear  PSYCH: calm, cooperative  SKIN: Warm, dry          LAB RESULTS  Recent Results (from the past 24 hours)   ECG 12 Lead ED Triage Standing Order; Chest Pain    Collection Time: 12/03/24  2:01 AM   Result Value Ref Range    QT Interval 355 ms    QTC Interval 444 ms   Comprehensive Metabolic Panel    Collection Time: 12/03/24  2:12 AM    Specimen: Blood   Result Value Ref Range    Glucose 96 65 - 99 mg/dL    BUN 13 6 - 20 mg/dL    Creatinine 1.01 (H) 0.57 - 1.00 mg/dL    Sodium 137 136 - 145 mmol/L    Potassium 3.5 3.5 - 5.2 mmol/L    Chloride 106 98 - 107 mmol/L    CO2 19.5 (L) 22.0 - 29.0 mmol/L    Calcium 9.3 8.6 - 10.5 mg/dL    Total Protein 6.5 6.0 - 8.5 g/dL    Albumin 3.9 3.5 - 5.2 g/dL    ALT (SGPT) 12 1 - 33 U/L    AST (SGOT) 22 1 - 32 U/L    Alkaline Phosphatase 76 39 - 117 U/L    Total Bilirubin 0.2 0.0 - 1.2 mg/dL    Globulin 2.6 gm/dL    A/G Ratio 1.5 g/dL    BUN/Creatinine Ratio 12.9 7.0 - 25.0    Anion Gap 11.5 5.0 - 15.0 mmol/L    eGFR 64.7 >60.0 mL/min/1.73   High Sensitivity Troponin T    Collection Time: 12/03/24  2:12 AM    Specimen: Blood   Result Value Ref Range    HS Troponin T <6 <14 ng/L   Green Top (Gel)    Collection Time:  12/03/24  2:12 AM   Result Value Ref Range    Extra Tube Hold for add-ons.    Lavender Top    Collection Time: 12/03/24  2:12 AM   Result Value Ref Range    Extra Tube hold for add-on    Gold Top - SST    Collection Time: 12/03/24  2:12 AM   Result Value Ref Range    Extra Tube Hold for add-ons.    Light Blue Top    Collection Time: 12/03/24  2:12 AM   Result Value Ref Range    Extra Tube Hold for add-ons.    CBC Auto Differential    Collection Time: 12/03/24  2:12 AM    Specimen: Blood   Result Value Ref Range    WBC 5.14 3.40 - 10.80 10*3/mm3    RBC 3.67 (L) 3.77 - 5.28 10*6/mm3    Hemoglobin 11.5 (L) 12.0 - 15.9 g/dL    Hematocrit 36.6 34.0 - 46.6 %    MCV 99.7 (H) 79.0 - 97.0 fL    MCH 31.3 26.6 - 33.0 pg    MCHC 31.4 (L) 31.5 - 35.7 g/dL    RDW 13.6 12.3 - 15.4 %    RDW-SD 49.9 37.0 - 54.0 fl    MPV 9.9 6.0 - 12.0 fL    Platelets 216 140 - 450 10*3/mm3    Neutrophil % 40.8 (L) 42.7 - 76.0 %    Lymphocyte % 43.6 19.6 - 45.3 %    Monocyte % 11.7 5.0 - 12.0 %    Eosinophil % 2.9 0.3 - 6.2 %    Basophil % 0.8 0.0 - 1.5 %    Immature Grans % 0.2 0.0 - 0.5 %    Neutrophils, Absolute 2.10 1.70 - 7.00 10*3/mm3    Lymphocytes, Absolute 2.24 0.70 - 3.10 10*3/mm3    Monocytes, Absolute 0.60 0.10 - 0.90 10*3/mm3    Eosinophils, Absolute 0.15 0.00 - 0.40 10*3/mm3    Basophils, Absolute 0.04 0.00 - 0.20 10*3/mm3    Immature Grans, Absolute 0.01 0.00 - 0.05 10*3/mm3    nRBC 0.0 0.0 - 0.2 /100 WBC   Ethanol    Collection Time: 12/03/24  2:12 AM    Specimen: Blood   Result Value Ref Range    Ethanol <10 0 - 10 mg/dL    Ethanol % <0.010 %   High Sensitivity Troponin T 2Hr    Collection Time: 12/03/24  4:34 AM    Specimen: Blood   Result Value Ref Range    HS Troponin T <6 <14 ng/L    Troponin T Delta     Urine Drug Screen - Urine, Clean Catch    Collection Time: 12/03/24  4:37 AM    Specimen: Urine, Clean Catch   Result Value Ref Range    Amphet/Methamphet, Screen Positive (A) Negative    Barbiturates Screen, Urine Negative  Negative    Benzodiazepine Screen, Urine Negative Negative    Cocaine Screen, Urine Negative Negative    Opiate Screen Negative Negative    THC, Screen, Urine Negative Negative    Methadone Screen, Urine Negative Negative    Oxycodone Screen, Urine Negative Negative    Fentanyl, Urine Negative Negative       The above labs were ordered by me and independently reviewed by me.     RADIOLOGY  XR Chest 1 View    Result Date: 12/3/2024  Patient: HOWARD MARTINEZ  Time Out: 04:04 Exam(s): XR CXR 1 VIEW EXAM:   XR Chest, 1 View CLINICAL HISTORY:    Reason for exam: Chest Pain Triage Protocol. TECHNIQUE:   Frontal view of the chest. COMPARISON:   No relevant prior studies available. FINDINGS:   Lungs:  No consolidation or mass.   Pleural space:  No acute findings.   Heart:  No cardiomegaly.   Bones joints:  No acute findings. IMPRESSION:       No acute cardiopulmonary process.     Electronically signed by Jessica Chavez MD on 12-03-24 at 0404    CT Head Without Contrast    Result Date: 12/3/2024  Patient: HOWARD MARTINEZ  Time Out: 03:34 Exam(s): CT HEAD Without Contrast EXAM:   CT Head Without Intravenous Contrast CLINICAL HISTORY:    Reason for exam: transient left side facial tingling, left arm spasm. TECHNIQUE:   Axial computed tomography images of the head brain without intravenous contrast.  CTDI is 55.7 mGy and DLP is 1005.8 mGy-cm.  This CT exam was performed according to the principle of ALARA (As Low As Reasonably Achievable) by using one or more of the following dose reduction techniques: automated exposure control, adjustment of the mA and or kV according to patient size, and or use of iterative reconstruction technique. COMPARISON:   No relevant prior studies available. FINDINGS:   Brain:  No hemorrhage or mass effect.   Ventricles:  No hydrocephalus.   Bones joints:  Unremarkable.   Soft tissues:  Unremarkable.   Sinuses:  No air fluid level.   Mastoid air cells:  Clear. IMPRESSION:       No acute hemorrhage,  hydrocephalus, or mass effect.     Electronically signed by Jessica Chavez MD on 12-03-24 at 0334     The above radiology studies were ordered by me.  See ED course for independent interpretations.     MEDICATIONS GIVEN IN ER  Medications   sodium chloride 0.9 % flush 10 mL (has no administration in time range)   aspirin tablet 325 mg (325 mg Oral Not Given 12/3/24 0441)         ORDERS PLACED DURING THIS VISIT:  Orders Placed This Encounter   Procedures    XR Chest 1 View    CT Head Without Contrast    [place] Draw    Comprehensive Metabolic Panel    High Sensitivity Troponin T    CBC Auto Differential    Ethanol    Urine Drug Screen - Urine, Clean Catch    High Sensitivity Troponin T 2Hr    NPO Diet NPO Type: Strict NPO    Undress & Gown    Continuous Pulse Oximetry    Oxygen Therapy- Nasal Cannula; Titrate 1-6 LPM Per SpO2; 90 - 95%    ECG 12 Lead ED Triage Standing Order; Chest Pain    ECG 12 Lead ED Triage Standing Order; Chest Pain    Insert Peripheral IV    CBC & Differential    Green Top (Gel)    Lavender Top    Gold Top - SST    Light Blue Top         OUTPATIENT MEDICATION MANAGEMENT:  Current Facility-Administered Medications Ordered in Epic   Medication Dose Route Frequency Provider Last Rate Last Admin    aspirin tablet 325 mg  325 mg Oral Once Jason Salinas MD        sodium chloride 0.9 % flush 10 mL  10 mL Intravenous PRN Jason Salinas MD         Current Outpatient Medications Ordered in Epic   Medication Sig Dispense Refill    Conj Estrog-Medroxyprogest Ace (PREMPRO PO) Take  by mouth.      folic acid (FOLVITE) 1 MG tablet Take 1 tablet by mouth Daily.      levETIRAcetam (KEPPRA) 1000 MG tablet Take 1 tablet by mouth 2 (Two) Times a Day.      nicotine (NICODERM CQ) 21 MG/24HR patch Place 1 patch on the skin as directed by provider Daily.      omeprazole (priLOSEC) 40 MG capsule Take 1 capsule by mouth Daily.      vitamin D (ERGOCALCIFEROL) 1.25 MG (16403 UT) capsule capsule Take 1  capsule by mouth Every 7 (Seven) Days.           PROCEDURES  Procedures            PROGRESS, DATA ANALYSIS, CONSULTS, AND MEDICAL DECISION MAKING  All labs have been independently interpreted by me.  All radiology studies have been reviewed by me. All EKG's have been independently viewed and interpreted by me.  Discussion below represents my analysis of pertinent findings related to patient's condition, differential diagnosis, treatment plan and final disposition.    Differential diagnosis includes but is not limited to:  Partial seizure  Acute coronary syndrome  GERD  Pneumothorax  TIA      Clinical Scores:                  ED Course as of 12/03/24 0649   Tue Dec 03, 2024   0228 EKG          EKG time: 201  Rhythm/Rate: Sinus rhythm, 94  P waves and IL: Normal  QRS, axis: Normal axis  ST and T waves: No acute ischemic changes    Interpreted Contemporaneously by me, independently viewed  Similar compared to prior 11/21/2024       [JR]   0229 Chest x-ray dependently interpreted PACS.  Lung fields are clear bilaterally, venous access port is present left anterior chest wall [JR]   0424 CT brain independently interpreted PACS demonstrates no acute intracranial hemorrhage. [JR]      ED Course User Index  [JR] Jason Salinas MD       Patient's chest pain evaluation has been reassuring here.  She did have some transient tingling on the left side of her face and she also reports that her left arm naga up towards her body but she did not lose consciousness.  She does have a history of epilepsy.  The paresthesias in the face and the left arm symptoms are concerning for partial seizure.  Patient vies to follow-up with her PCP and or neurologist as outpatient.      AS OF 06:49 EST VITALS:    BP - 132/78  HR - 88  TEMP - 97.8 °F (36.6 °C) (Tympanic)  O2 SATS - 92%    COMPLEXITY OF CARE        Chronic or social conditions impacting patient care (Social Determinants of Health):     DIAGNOSIS  Final diagnoses:   Paresthesia    Atypical chest pain   History of seizure           DISPOSITION  DISCHARGE    Patient discharged in stable condition.    Reviewed implications of results, diagnosis, meds, responsibility to follow up, warning signs and symptoms of possible worsening, potential complications and reasons to return to ER.    Patient/Family voiced understanding of above instructions.    Discussed plan for discharge, as there is no emergent indication for admission. Patient referred to primary care provider for BP management due to today's BP. Pt/family is agreeable and understands need for follow up and repeat testing.  Pt is aware that discharge does not mean that nothing is wrong but it indicates no emergency is present that requires admission and they must continue care with follow-up as given below or physician of their choice.     FOLLOW-UP  PATIENT CONNECTION - [facility] [address]  [phone]  Call in 1 day      [facility]  [address] [facility] [address] [phone]    As needed         Medication List      No changes were made to your prescriptions during this visit.             Prescription drug monitoring program review:           Please note that portions of this document were completed with a voice recognition program.    Note Disclaimer: At [facility], we believe that sharing information builds trust and better relationships. You are receiving this note because you recently visited [facility]. It is possible you will see health information before a provider has talked with you about it. This kind of information can be easy to misunderstand. To help you fully understand what it means for your health, we urge you to discuss this note with your provider.         Jason Salinas MD  12/04/24 0852

## 2024-12-03 NOTE — ED NOTES
"Pt arrived via EMS from home. Called out for stroke. Pt ambulatory on scene. Pt reports left arm numbness and weakness. The pt reports its been weak for a while but it was more weak since the last couple of hours. The pt also reported numbness on the left side of her face. The pt reports she also has chest pain that started at the same time. Pt stated she was not sure if she was having a stroke or a heart attack.   The pt ambulated to the stretcher in room 11. The pt then asked if her daughter was standing in the hallway. When asked who her daughter was the pt stated \"Shhh I'm done talking to you\" then stated that her daughter was at home.   "

## 2025-04-28 ENCOUNTER — APPOINTMENT (OUTPATIENT)
Dept: GENERAL RADIOLOGY | Facility: HOSPITAL | Age: 60
End: 2025-04-28
Payer: COMMERCIAL

## 2025-04-28 ENCOUNTER — HOSPITAL ENCOUNTER (OUTPATIENT)
Facility: HOSPITAL | Age: 60
Setting detail: OBSERVATION
Discharge: HOME | End: 2025-05-01
Attending: EMERGENCY MEDICINE | Admitting: INTERNAL MEDICINE
Payer: COMMERCIAL

## 2025-04-28 ENCOUNTER — APPOINTMENT (OUTPATIENT)
Dept: CT IMAGING | Facility: HOSPITAL | Age: 60
End: 2025-04-28
Payer: COMMERCIAL

## 2025-04-28 ENCOUNTER — APPOINTMENT (OUTPATIENT)
Dept: ULTRASOUND IMAGING | Facility: HOSPITAL | Age: 60
End: 2025-04-28
Payer: COMMERCIAL

## 2025-04-28 DIAGNOSIS — R11.2 INTRACTABLE NAUSEA AND VOMITING: Primary | ICD-10-CM

## 2025-04-28 DIAGNOSIS — R10.10 PAIN OF UPPER ABDOMEN: ICD-10-CM

## 2025-04-28 DIAGNOSIS — T50.902D INTENTIONAL OVERDOSE, SUBSEQUENT ENCOUNTER: ICD-10-CM

## 2025-04-28 LAB
ALBUMIN SERPL-MCNC: 4.2 G/DL (ref 3.5–5.2)
ALBUMIN/GLOB SERPL: 1.4 G/DL
ALP SERPL-CCNC: 82 U/L (ref 39–117)
ALT SERPL W P-5'-P-CCNC: 16 U/L (ref 1–33)
ANION GAP SERPL CALCULATED.3IONS-SCNC: 11 MMOL/L (ref 5–15)
AST SERPL-CCNC: 23 U/L (ref 1–32)
BASOPHILS # BLD AUTO: 0.03 10*3/MM3 (ref 0–0.2)
BASOPHILS NFR BLD AUTO: 0.4 % (ref 0–1.5)
BILIRUB SERPL-MCNC: 0.5 MG/DL (ref 0–1.2)
BUN SERPL-MCNC: 17 MG/DL (ref 6–20)
BUN/CREAT SERPL: 18.9 (ref 7–25)
CALCIUM SPEC-SCNC: 9.6 MG/DL (ref 8.6–10.5)
CHLORIDE SERPL-SCNC: 102 MMOL/L (ref 98–107)
CO2 SERPL-SCNC: 23 MMOL/L (ref 22–29)
CREAT SERPL-MCNC: 0.9 MG/DL (ref 0.57–1)
DEPRECATED RDW RBC AUTO: 54.1 FL (ref 37–54)
EGFRCR SERPLBLD CKD-EPI 2021: 73.8 ML/MIN/1.73
EOSINOPHIL # BLD AUTO: 0.1 10*3/MM3 (ref 0–0.4)
EOSINOPHIL NFR BLD AUTO: 1.2 % (ref 0.3–6.2)
ERYTHROCYTE [DISTWIDTH] IN BLOOD BY AUTOMATED COUNT: 14.6 % (ref 12.3–15.4)
ETHANOL BLD-MCNC: <10 MG/DL (ref 0–10)
ETHANOL UR QL: <0.01 %
GEN 5 1HR TROPONIN T REFLEX: <6 NG/L
GLOBULIN UR ELPH-MCNC: 3 GM/DL
GLUCOSE SERPL-MCNC: 98 MG/DL (ref 65–99)
HCT VFR BLD AUTO: 42.7 % (ref 34–46.6)
HGB BLD-MCNC: 14.3 G/DL (ref 12–15.9)
HOLD SPECIMEN: NORMAL
HOLD SPECIMEN: NORMAL
IMM GRANULOCYTES # BLD AUTO: 0.01 10*3/MM3 (ref 0–0.05)
IMM GRANULOCYTES NFR BLD AUTO: 0.1 % (ref 0–0.5)
LYMPHOCYTES # BLD AUTO: 2.13 10*3/MM3 (ref 0.7–3.1)
LYMPHOCYTES NFR BLD AUTO: 25.8 % (ref 19.6–45.3)
MCH RBC QN AUTO: 33.7 PG (ref 26.6–33)
MCHC RBC AUTO-ENTMCNC: 33.5 G/DL (ref 31.5–35.7)
MCV RBC AUTO: 100.7 FL (ref 79–97)
MONOCYTES # BLD AUTO: 0.81 10*3/MM3 (ref 0.1–0.9)
MONOCYTES NFR BLD AUTO: 9.8 % (ref 5–12)
NEUTROPHILS NFR BLD AUTO: 5.19 10*3/MM3 (ref 1.7–7)
NEUTROPHILS NFR BLD AUTO: 62.7 % (ref 42.7–76)
NRBC BLD AUTO-RTO: 0 /100 WBC (ref 0–0.2)
PLATELET # BLD AUTO: 240 10*3/MM3 (ref 140–450)
PMV BLD AUTO: 9.2 FL (ref 6–12)
POTASSIUM SERPL-SCNC: 3.8 MMOL/L (ref 3.5–5.2)
PROT SERPL-MCNC: 7.2 G/DL (ref 6–8.5)
RBC # BLD AUTO: 4.24 10*6/MM3 (ref 3.77–5.28)
SODIUM SERPL-SCNC: 136 MMOL/L (ref 136–145)
TROPONIN T NUMERIC DELTA: NORMAL
TROPONIN T SERPL HS-MCNC: <6 NG/L
WBC NRBC COR # BLD AUTO: 8.27 10*3/MM3 (ref 3.4–10.8)
WHOLE BLOOD HOLD COAG: NORMAL
WHOLE BLOOD HOLD SPECIMEN: NORMAL

## 2025-04-28 PROCEDURE — 25510000001 IOPAMIDOL 61 % SOLUTION: Performed by: EMERGENCY MEDICINE

## 2025-04-28 PROCEDURE — 82077 ASSAY SPEC XCP UR&BREATH IA: CPT | Performed by: EMERGENCY MEDICINE

## 2025-04-28 PROCEDURE — 25010000002 ONDANSETRON PER 1 MG: Performed by: INTERNAL MEDICINE

## 2025-04-28 PROCEDURE — 36415 COLL VENOUS BLD VENIPUNCTURE: CPT | Performed by: EMERGENCY MEDICINE

## 2025-04-28 PROCEDURE — 71045 X-RAY EXAM CHEST 1 VIEW: CPT

## 2025-04-28 PROCEDURE — 96375 TX/PRO/DX INJ NEW DRUG ADDON: CPT

## 2025-04-28 PROCEDURE — 76705 ECHO EXAM OF ABDOMEN: CPT

## 2025-04-28 PROCEDURE — 99285 EMERGENCY DEPT VISIT HI MDM: CPT

## 2025-04-28 PROCEDURE — 25010000002 DROPERIDOL PER 5 MG: Performed by: EMERGENCY MEDICINE

## 2025-04-28 PROCEDURE — 84484 ASSAY OF TROPONIN QUANT: CPT | Performed by: EMERGENCY MEDICINE

## 2025-04-28 PROCEDURE — 85025 COMPLETE CBC W/AUTO DIFF WBC: CPT | Performed by: EMERGENCY MEDICINE

## 2025-04-28 PROCEDURE — 96374 THER/PROPH/DIAG INJ IV PUSH: CPT

## 2025-04-28 PROCEDURE — 96361 HYDRATE IV INFUSION ADD-ON: CPT

## 2025-04-28 PROCEDURE — 93005 ELECTROCARDIOGRAM TRACING: CPT | Performed by: EMERGENCY MEDICINE

## 2025-04-28 PROCEDURE — G0378 HOSPITAL OBSERVATION PER HR: HCPCS

## 2025-04-28 PROCEDURE — 96376 TX/PRO/DX INJ SAME DRUG ADON: CPT

## 2025-04-28 PROCEDURE — 25010000002 HYDROMORPHONE PER 4 MG: Performed by: INTERNAL MEDICINE

## 2025-04-28 PROCEDURE — 25010000002 ONDANSETRON PER 1 MG: Performed by: EMERGENCY MEDICINE

## 2025-04-28 PROCEDURE — 25010000002 SODIUM CHLORIDE 0.9 % WITH KCL 20 MEQ 20-0.9 MEQ/L-% SOLUTION: Performed by: INTERNAL MEDICINE

## 2025-04-28 PROCEDURE — 80053 COMPREHEN METABOLIC PANEL: CPT | Performed by: EMERGENCY MEDICINE

## 2025-04-28 PROCEDURE — 93010 ELECTROCARDIOGRAM REPORT: CPT | Performed by: INTERNAL MEDICINE

## 2025-04-28 PROCEDURE — 25010000002 HYDROMORPHONE 1 MG/ML SOLUTION: Performed by: EMERGENCY MEDICINE

## 2025-04-28 PROCEDURE — 25810000003 SODIUM CHLORIDE 0.9 % SOLUTION: Performed by: EMERGENCY MEDICINE

## 2025-04-28 PROCEDURE — 74177 CT ABD & PELVIS W/CONTRAST: CPT

## 2025-04-28 RX ORDER — LEVETIRACETAM 500 MG/1
1000 TABLET ORAL 2 TIMES DAILY
Status: DISCONTINUED | OUTPATIENT
Start: 2025-04-28 | End: 2025-05-01 | Stop reason: HOSPADM

## 2025-04-28 RX ORDER — HYDROMORPHONE HYDROCHLORIDE 1 MG/ML
0.5 INJECTION, SOLUTION INTRAMUSCULAR; INTRAVENOUS; SUBCUTANEOUS EVERY 4 HOURS PRN
Refills: 0 | Status: DISCONTINUED | OUTPATIENT
Start: 2025-04-28 | End: 2025-04-29

## 2025-04-28 RX ORDER — POLYETHYLENE GLYCOL 3350 17 G/17G
17 POWDER, FOR SOLUTION ORAL DAILY PRN
Status: DISCONTINUED | OUTPATIENT
Start: 2025-04-28 | End: 2025-05-01 | Stop reason: HOSPADM

## 2025-04-28 RX ORDER — TOPIRAMATE 200 MG/1
1 TABLET, FILM COATED ORAL EVERY 12 HOURS SCHEDULED
COMMUNITY
Start: 2025-01-15

## 2025-04-28 RX ORDER — IOPAMIDOL 612 MG/ML
85 INJECTION, SOLUTION INTRAVASCULAR
Status: COMPLETED | OUTPATIENT
Start: 2025-04-28 | End: 2025-04-28

## 2025-04-28 RX ORDER — DROPERIDOL 2.5 MG/ML
2.5 INJECTION, SOLUTION INTRAMUSCULAR; INTRAVENOUS ONCE
Status: COMPLETED | OUTPATIENT
Start: 2025-04-28 | End: 2025-04-28

## 2025-04-28 RX ORDER — TOPIRAMATE 100 MG/1
200 TABLET, FILM COATED ORAL EVERY 12 HOURS SCHEDULED
Status: DISCONTINUED | OUTPATIENT
Start: 2025-04-28 | End: 2025-05-01 | Stop reason: HOSPADM

## 2025-04-28 RX ORDER — SODIUM CHLORIDE 0.9 % (FLUSH) 0.9 %
10 SYRINGE (ML) INJECTION AS NEEDED
Status: DISCONTINUED | OUTPATIENT
Start: 2025-04-28 | End: 2025-05-01 | Stop reason: HOSPADM

## 2025-04-28 RX ORDER — SODIUM CHLORIDE 9 MG/ML
100 INJECTION, SOLUTION INTRAVENOUS CONTINUOUS
Status: DISCONTINUED | OUTPATIENT
Start: 2025-04-28 | End: 2025-04-29

## 2025-04-28 RX ORDER — SODIUM CHLORIDE AND POTASSIUM CHLORIDE 150; 900 MG/100ML; MG/100ML
100 INJECTION, SOLUTION INTRAVENOUS CONTINUOUS
Status: DISCONTINUED | OUTPATIENT
Start: 2025-04-28 | End: 2025-04-29

## 2025-04-28 RX ORDER — SODIUM CHLORIDE 0.9 % (FLUSH) 0.9 %
20 SYRINGE (ML) INJECTION AS NEEDED
Status: DISCONTINUED | OUTPATIENT
Start: 2025-04-28 | End: 2025-05-01 | Stop reason: HOSPADM

## 2025-04-28 RX ORDER — PANTOPRAZOLE SODIUM 40 MG/10ML
40 INJECTION, POWDER, LYOPHILIZED, FOR SOLUTION INTRAVENOUS
Status: DISCONTINUED | OUTPATIENT
Start: 2025-04-29 | End: 2025-04-30

## 2025-04-28 RX ORDER — AMOXICILLIN 250 MG
2 CAPSULE ORAL 2 TIMES DAILY PRN
Status: DISCONTINUED | OUTPATIENT
Start: 2025-04-28 | End: 2025-05-01 | Stop reason: HOSPADM

## 2025-04-28 RX ORDER — BISACODYL 5 MG/1
5 TABLET, DELAYED RELEASE ORAL DAILY PRN
Status: DISCONTINUED | OUTPATIENT
Start: 2025-04-28 | End: 2025-05-01 | Stop reason: HOSPADM

## 2025-04-28 RX ORDER — HEPARIN SODIUM (PORCINE) LOCK FLUSH IV SOLN 100 UNIT/ML 100 UNIT/ML
5 SOLUTION INTRAVENOUS AS NEEDED
Status: DISCONTINUED | OUTPATIENT
Start: 2025-04-28 | End: 2025-05-01 | Stop reason: HOSPADM

## 2025-04-28 RX ORDER — ONDANSETRON 2 MG/ML
4 INJECTION INTRAMUSCULAR; INTRAVENOUS ONCE
Status: COMPLETED | OUTPATIENT
Start: 2025-04-28 | End: 2025-04-28

## 2025-04-28 RX ORDER — SODIUM CHLORIDE 9 MG/ML
40 INJECTION, SOLUTION INTRAVENOUS AS NEEDED
Status: DISCONTINUED | OUTPATIENT
Start: 2025-04-28 | End: 2025-05-01 | Stop reason: HOSPADM

## 2025-04-28 RX ORDER — ACETAMINOPHEN 160 MG/5ML
650 SOLUTION ORAL EVERY 4 HOURS PRN
Status: DISCONTINUED | OUTPATIENT
Start: 2025-04-28 | End: 2025-05-01 | Stop reason: HOSPADM

## 2025-04-28 RX ORDER — SODIUM CHLORIDE 0.9 % (FLUSH) 0.9 %
10 SYRINGE (ML) INJECTION EVERY 12 HOURS SCHEDULED
Status: DISCONTINUED | OUTPATIENT
Start: 2025-04-28 | End: 2025-05-01 | Stop reason: HOSPADM

## 2025-04-28 RX ORDER — ACETAMINOPHEN 325 MG/1
650 TABLET ORAL EVERY 4 HOURS PRN
Status: DISCONTINUED | OUTPATIENT
Start: 2025-04-28 | End: 2025-05-01 | Stop reason: HOSPADM

## 2025-04-28 RX ORDER — ALBUTEROL SULFATE 0.83 MG/ML
2.5 SOLUTION RESPIRATORY (INHALATION) EVERY 6 HOURS PRN
Status: DISCONTINUED | OUTPATIENT
Start: 2025-04-28 | End: 2025-05-01 | Stop reason: HOSPADM

## 2025-04-28 RX ORDER — ASPIRIN 325 MG
325 TABLET ORAL ONCE
Status: DISCONTINUED | OUTPATIENT
Start: 2025-04-28 | End: 2025-04-30

## 2025-04-28 RX ORDER — ALBUTEROL SULFATE 90 UG/1
2 INHALANT RESPIRATORY (INHALATION) EVERY 6 HOURS PRN
COMMUNITY
Start: 2025-03-07

## 2025-04-28 RX ORDER — ONDANSETRON 2 MG/ML
4 INJECTION INTRAMUSCULAR; INTRAVENOUS EVERY 6 HOURS PRN
Status: DISCONTINUED | OUTPATIENT
Start: 2025-04-28 | End: 2025-04-29

## 2025-04-28 RX ORDER — ACETAMINOPHEN 650 MG/1
650 SUPPOSITORY RECTAL EVERY 4 HOURS PRN
Status: DISCONTINUED | OUTPATIENT
Start: 2025-04-28 | End: 2025-05-01 | Stop reason: HOSPADM

## 2025-04-28 RX ORDER — BISACODYL 10 MG
10 SUPPOSITORY, RECTAL RECTAL DAILY PRN
Status: DISCONTINUED | OUTPATIENT
Start: 2025-04-28 | End: 2025-05-01 | Stop reason: HOSPADM

## 2025-04-28 RX ADMIN — ONDANSETRON 4 MG: 2 INJECTION, SOLUTION INTRAMUSCULAR; INTRAVENOUS at 22:35

## 2025-04-28 RX ADMIN — IOPAMIDOL 85 ML: 612 INJECTION, SOLUTION INTRAVENOUS at 13:08

## 2025-04-28 RX ADMIN — HYDROMORPHONE HYDROCHLORIDE 1 MG: 1 INJECTION, SOLUTION INTRAMUSCULAR; INTRAVENOUS; SUBCUTANEOUS at 12:25

## 2025-04-28 RX ADMIN — DROPERIDOL 2.5 MG: 2.5 INJECTION, SOLUTION INTRAMUSCULAR; INTRAVENOUS at 16:36

## 2025-04-28 RX ADMIN — Medication 10 ML: at 21:34

## 2025-04-28 RX ADMIN — HYDROMORPHONE HYDROCHLORIDE 1 MG: 1 INJECTION, SOLUTION INTRAMUSCULAR; INTRAVENOUS; SUBCUTANEOUS at 15:00

## 2025-04-28 RX ADMIN — ONDANSETRON 4 MG: 2 INJECTION, SOLUTION INTRAMUSCULAR; INTRAVENOUS at 12:25

## 2025-04-28 RX ADMIN — SODIUM CHLORIDE 1000 ML: 9 INJECTION, SOLUTION INTRAVENOUS at 12:24

## 2025-04-28 RX ADMIN — Medication 10 ML: at 12:35

## 2025-04-28 RX ADMIN — ONDANSETRON 4 MG: 2 INJECTION, SOLUTION INTRAMUSCULAR; INTRAVENOUS at 13:40

## 2025-04-28 RX ADMIN — TOPIRAMATE 200 MG: 100 TABLET, FILM COATED ORAL at 21:31

## 2025-04-28 RX ADMIN — HYDROMORPHONE HYDROCHLORIDE 0.5 MG: 1 INJECTION, SOLUTION INTRAMUSCULAR; INTRAVENOUS; SUBCUTANEOUS at 22:40

## 2025-04-28 RX ADMIN — LEVETIRACETAM 1000 MG: 500 TABLET, FILM COATED ORAL at 21:31

## 2025-04-28 RX ADMIN — POTASSIUM CHLORIDE AND SODIUM CHLORIDE 100 ML/HR: 900; 150 INJECTION, SOLUTION INTRAVENOUS at 21:30

## 2025-04-28 NOTE — PLAN OF CARE
Goal Outcome Evaluation:  Plan of Care Reviewed With: patient        Progress: no change  Outcome Evaluation: rec'd from ED.  pt requests to be allowed to rest.  no vomiting at this time. Side rails padded due to history of seizures.

## 2025-04-28 NOTE — ED NOTES
Nursing report ED to floor  Noelle Mehta  60 y.o.  female    HPI :  HPI  Stated Reason for Visit: abd pain  History Obtained From: EMS, patient    Chief Complaint  Chief Complaint   Patient presents with    Abdominal Pain       Admitting doctor:   Sadia Whitt MD    Admitting diagnosis:   The primary encounter diagnosis was Intractable nausea and vomiting. A diagnosis of Pain of upper abdomen was also pertinent to this visit.    Code status:   Current Code Status       Date Active Code Status Order ID Comments User Context       Prior            Allergies:   Codeine, Fentanyl, Lactose intolerance (gi), Naltrexone, and Latex    Isolation:   No active isolations    Intake and Output    Intake/Output Summary (Last 24 hours) at 4/28/2025 1712  Last data filed at 4/28/2025 1254  Gross per 24 hour   Intake 1000 ml   Output --   Net 1000 ml       Weight:   There were no vitals filed for this visit.    Most recent vitals:   Vitals:    04/28/25 1400 04/28/25 1401 04/28/25 1501 04/28/25 1531   BP:  137/81 144/91 154/99   BP Location:    Left arm   Patient Position:    Lying   Pulse: 91 93 90 92   Resp:  18 18 18   Temp:       TempSrc:       SpO2: 92%  93% 94%       Active LDAs/IV Access:   Lines, Drains & Airways       Active LDAs       Name Placement date Placement time Site Days    Single Lumen Implantable Port Left Subclavian --  --  Subclavian  --                    Labs (abnormal labs have a star):   Labs Reviewed   CBC WITH AUTO DIFFERENTIAL - Abnormal; Notable for the following components:       Result Value    .7 (*)     MCH 33.7 (*)     RDW-SD 54.1 (*)     All other components within normal limits   TROPONIN - Normal    Narrative:     High Sensitive Troponin T Reference Range:  <14.0 ng/L- Negative Female for AMI  <22.0 ng/L- Negative Male for AMI  >=14 - Abnormal Female indicating possible myocardial injury.  >=22 - Abnormal Male indicating possible myocardial injury.   Clinicians would have to utilize  clinical acumen, EKG, Troponin, and serial changes to determine if it is an Acute Myocardial Infarction or myocardial injury due to an underlying chronic condition.        RAINBOW DRAW    Narrative:     The following orders were created for panel order [place] Draw.  Procedure                               Abnormality         Status                     ---------                               -----------         ------                     Green Top (Gel)[[id number]]                                  Final result               Lavender Top[[id number]]                                     Final result               Gold Top - SST[[id number]]                                   Final result               Light Blue Top[[id number]]                                   Final result                 Please view results for these tests on the individual orders.   COMPREHENSIVE METABOLIC PANEL    Narrative:     GFR Categories in Chronic Kidney Disease (CKD)      GFR Category          GFR (mL/min/1.73)    Interpretation  G1                     90 or greater         Normal or high (1)  G2                      60-89                Mild decrease (1)  G3a                   45-59                Mild to moderate decrease  G3b                   30-44                Moderate to severe decrease  G4                    15-29                Severe decrease  G5                    14 or less           Kidney failure          (1)In the absence of evidence of kidney disease, neither GFR category G1 or G2 fulfill the criteria for CKD.    eGFR calculation 2021 CKD-EPI creatinine equation, which does not include race as a factor   ETHANOL   HIGH SENSITIVITIY TROPONIN T 1HR    Narrative:     High Sensitive Troponin T Reference Range:  <14.0 ng/L- Negative Female for AMI  <22.0 ng/L- Negative Male for AMI  >=14 - Abnormal Female indicating possible myocardial injury.  >=22 - Abnormal Male indicating possible myocardial injury.   Clinicians would have to  utilize clinical acumen, EKG, Troponin, and serial changes to determine if it is an Acute Myocardial Infarction or myocardial injury due to an underlying chronic condition.        CBC AND DIFFERENTIAL    Narrative:     The following orders were created for panel order CBC & Differential.  Procedure                               Abnormality         Status                     ---------                               -----------         ------                     CBC Auto Differential[[id number]]        Abnormal            Final result                 Please view results for these tests on the individual orders.   GREEN TOP   LAVENDER TOP   GOLD TOP - SST   LIGHT BLUE TOP       EKG:   ECG 12 Lead ED Triage Standing Order; Chest Pain   Preliminary Result   HEART RATE=82  bpm   RR Ypygxyhp=701  ms   [place] Ycgclhyy=140  ms   P Horizontal Axis=18  deg   P Front Axis=68  deg   QRSD Interval=87  ms   QT Kqtiqifd=347  ms   WBlM=850  ms   QRS Axis=43  deg   T Wave Axis=59  deg   - OTHERWISE NORMAL ECG -   Sinus rhythm   Abnormal R-wave progression, early transition   Date and Time of Study:2025-04-28 11:42:33          Meds given in ED:   Medications   sodium chloride 0.9 % flush 10 mL (has no administration in time range)   aspirin tablet 325 mg (325 mg Oral Not Given 4/28/25 1035)   sodium chloride 0.9 % flush 10 mL (10 mL Intravenous Given 4/28/25 1235)   sodium chloride 0.9 % flush 10 mL (has no administration in time range)   sodium chloride 0.9 % flush 20 mL (has no administration in time range)   sodium chloride 0.9 % infusion 40 mL (has no administration in time range)   heparin injection 500 Units (has no administration in time range)   sodium chloride 0.9 % bolus 1,000 mL (0 mL Intravenous Stopped 4/28/25 1254)   ondansetron (ZOFRAN) injection 4 mg (4 mg Intravenous Given 4/28/25 1225)   HYDROmorphone (DILAUDID) injection 1 mg (1 mg Intravenous Given 4/28/25 1225)   iopamidol (ISOVUE-300) 61 % injection 85 mL (85 mL  Intravenous Given by Other 4/28/25 1308)   ondansetron (ZOFRAN) injection 4 mg (4 mg Intravenous Given 4/28/25 1340)   HYDROmorphone (DILAUDID) injection 1 mg (1 mg Intravenous Given 4/28/25 1500)   droperidol (INAPSINE) injection 2.5 mg (2.5 mg Intravenous Given 4/28/25 1636)       Imaging results:  US Gallbladder  Result Date: 4/28/2025  1.  Negative right upper quadrant sonogram  This report was finalized on 4/28/2025 4:17 PM by Dr. Jaquan Ignacio M.D on Workstation: [id number]      CT Abdomen Pelvis With Contrast  Result Date: 4/28/2025  1. No evidence for acute intra-abdominal process. Bowel loops are mildly air distended though there is no bowel dilatation to suggest obstruction or evidence for appendicitis. 2. Mild atherosclerotic disease.  Radiation dose reduction techniques were utilized, including automated exposure control and exposure modulation based on body size.   This report was finalized on 4/28/2025 3:31 PM by Arvin Haynes M.D on Workstation: [id number]      XR Chest 1 View  Result Date: 4/28/2025  1. No acute process.   This report was finalized on 4/28/2025 10:14 AM by Dr. Jose Lassiter M.D on Workstation: [id number]        Ambulatory status:   Ad scott    Social issues:   Social History     Socioeconomic History    Marital status:    Tobacco Use    Smoking status: Every Day     Types: Cigarettes    Smokeless tobacco: Never   Vaping Use    Vaping status: Never Used   Substance and Sexual Activity    Alcohol use: No     Comment: sober since 1/2018    Drug use: No    Sexual activity: Defer       Peripheral Neurovascular  Peripheral Neurovascular (Adult)  Peripheral Neurovascular WDL: WDL    Neuro Cognitive  Neuro Cognitive (Adult)  Cognitive/Neuro/Behavioral WDL: .WDL except, mood/behavior  Mood/Behavior: uncooperative, labile, other (see comments) (irritable)    Learning  Learning Assessment  Learning Readiness and Ability: psychosocial barrier noted    Respiratory  Respiratory  WDL  Respiratory WDL: WDL    Abdominal Pain       Pain Assessments  Pain (Adult)  (0-10) Pain Rating: Rest: 8  (0-10) Pain Rating: Activity: 8  Pain Location: abdomen  Pain Side/Orientation: generalized  Response to Pain Interventions: nonverbal indicators absent/decreased    NIH Stroke Scale       Magaly Blackwell RN  04/28/25 17:12 EDT

## 2025-04-28 NOTE — ED NOTES
Pt to ed from home via EMS    Pt c/o RLQ abd pain with nausea and vomiting. Pt originally called EMS for CP. PTA pt had 324 aspirin. Pt denies CP in triage but reports pain when taking a deep breath.

## 2025-04-28 NOTE — ED NOTES
Pt has repeatedly refused to cooperate with nursing staff during this visit. She frequently removes monitor leads, paces in room, is argumentative with staff when asked to follow hospital's pt behavior expectations. MD Almazan aware, Charge RN (Yuval) aware, ED security aware.

## 2025-04-28 NOTE — ED PROVIDER NOTES
EMERGENCY DEPARTMENT ENCOUNTER    Room Number:  39/39  PCP: Provider, No Known  Historian: Patient      HPI:  Chief Complaint: Abdominal pain  A complete HPI/ROS/PMH/PSH/SH/FH are unobtainable due to: None    Context: Noelle Mehta is a 60 y.o. female who presents to the ED via [facility] from home c/o acute right upper quadrant Soy pain and chest pain.  Has had nausea but no vomiting.  Patient states symptoms started on Friday and continued to escalate.  Diminished bowel movements, diminished p.o. intake, diminished urinary output.  No fevers or chills.  History of lung cancer currently in remission without active chemo or radiation.  Reports taking ibuprofen, Excedrin, Tylenol without significant relief.      MEDICAL RECORD REVIEW    External (non-ED) record review: Chart review in epic shows a CT abdomen/pelvis from December 19, 2024 obtained due to right lower quadrant abdominal pain with vomiting and diarrhea, findings there without obvious source of abdominal discomfort    NESHA reviewed by Sadia Whitt MD, Tung Almazan MD         PAST MEDICAL HISTORY  Active Ambulatory Problems     Diagnosis Date Noted    Intentional overdose 11/19/2024     Resolved Ambulatory Problems     Diagnosis Date Noted    No Resolved Ambulatory Problems     Past Medical History:   Diagnosis Date    Epilepsy     H/O ETOH abuse     Seizures          PAST SURGICAL HISTORY  Past Surgical History:   Procedure Laterality Date    BACK SURGERY           FAMILY HISTORY  History reviewed. No pertinent family history.      SOCIAL HISTORY  Social History     Socioeconomic History    Marital status:    Tobacco Use    Smoking status: Every Day     Types: Cigarettes    Smokeless tobacco: Never   Vaping Use    Vaping status: Never Used   Substance and Sexual Activity    Alcohol use: No     Comment: sober since 1/2018    Drug use: No    Sexual activity: Defer         ALLERGIES  Codeine, Fentanyl, Lactose intolerance (gi),  Naltrexone, and Latex        REVIEW OF SYSTEMS  Review of Systems     All systems reviewed and negative except for those discussed in HPI.       PHYSICAL EXAM    I have reviewed the triage vital signs and nursing notes.    ED Triage Vitals [04/28/25 0943]   Temp Heart Rate Resp BP SpO2   98.5 °F (36.9 °C) 78 18 (!) 171/108 96 %      Temp src Heart Rate Source Patient Position BP Location FiO2 (%)   Tympanic Monitor Sitting Right arm --       Physical Exam  General:.  Uncomfortable, nontoxic, nondiaphoretic  HEENT: Mucous membranes moist, atraumatic, EOMI  Neck: Full ROM  Pulm: Symmetric chest rise, nonlabored, lungs CTAB  Cardiovascular: Regular rate and rhythm, intact distal pulses  GI: Soft, mild right upper quadrant epigastric discomfort to palpation, nondistended, no rebound, no guarding, bowel sounds present  MSK: Full ROM, no deformity  Skin: Warm, dry  Neuro: Awake, alert, oriented x 4, GCS 15, moving all extremities, no focal deficits  Psych: Emotionally labile        LAB RESULTS  Recent Results (from the past 24 hours)   Comprehensive Metabolic Panel    Collection Time: 04/28/25 10:32 AM    Specimen: Arm, Right; Blood   Result Value Ref Range    Glucose 98 65 - 99 mg/dL    BUN 17 6 - 20 mg/dL    Creatinine 0.90 0.57 - 1.00 mg/dL    Sodium 136 136 - 145 mmol/L    Potassium 3.8 3.5 - 5.2 mmol/L    Chloride 102 98 - 107 mmol/L    CO2 23.0 22.0 - 29.0 mmol/L    Calcium 9.6 8.6 - 10.5 mg/dL    Total Protein 7.2 6.0 - 8.5 g/dL    Albumin 4.2 3.5 - 5.2 g/dL    ALT (SGPT) 16 1 - 33 U/L    AST (SGOT) 23 1 - 32 U/L    Alkaline Phosphatase 82 39 - 117 U/L    Total Bilirubin 0.5 0.0 - 1.2 mg/dL    Globulin 3.0 gm/dL    A/G Ratio 1.4 g/dL    BUN/Creatinine Ratio 18.9 7.0 - 25.0    Anion Gap 11.0 5.0 - 15.0 mmol/L    eGFR 73.8 >60.0 mL/min/1.73   High Sensitivity Troponin T    Collection Time: 04/28/25 10:32 AM    Specimen: Arm, Right; Blood   Result Value Ref Range    HS Troponin T <6 <14 ng/L   Green Top (Gel)     Collection Time: 04/28/25 10:32 AM   Result Value Ref Range    Extra Tube Hold for add-ons.    Lavender Top    Collection Time: 04/28/25 10:32 AM   Result Value Ref Range    Extra Tube hold for add-on    Gold Top - SST    Collection Time: 04/28/25 10:32 AM   Result Value Ref Range    Extra Tube Hold for add-ons.    Light Blue Top    Collection Time: 04/28/25 10:32 AM   Result Value Ref Range    Extra Tube Hold for add-ons.    CBC Auto Differential    Collection Time: 04/28/25 10:32 AM    Specimen: Arm, Right; Blood   Result Value Ref Range    WBC 8.27 3.40 - 10.80 10*3/mm3    RBC 4.24 3.77 - 5.28 10*6/mm3    Hemoglobin 14.3 12.0 - 15.9 g/dL    Hematocrit 42.7 34.0 - 46.6 %    .7 (H) 79.0 - 97.0 fL    MCH 33.7 (H) 26.6 - 33.0 pg    MCHC 33.5 31.5 - 35.7 g/dL    RDW 14.6 12.3 - 15.4 %    RDW-SD 54.1 (H) 37.0 - 54.0 fl    MPV 9.2 6.0 - 12.0 fL    Platelets 240 140 - 450 10*3/mm3    Neutrophil % 62.7 42.7 - 76.0 %    Lymphocyte % 25.8 19.6 - 45.3 %    Monocyte % 9.8 5.0 - 12.0 %    Eosinophil % 1.2 0.3 - 6.2 %    Basophil % 0.4 0.0 - 1.5 %    Immature Grans % 0.1 0.0 - 0.5 %    Neutrophils, Absolute 5.19 1.70 - 7.00 10*3/mm3    Lymphocytes, Absolute 2.13 0.70 - 3.10 10*3/mm3    Monocytes, Absolute 0.81 0.10 - 0.90 10*3/mm3    Eosinophils, Absolute 0.10 0.00 - 0.40 10*3/mm3    Basophils, Absolute 0.03 0.00 - 0.20 10*3/mm3    Immature Grans, Absolute 0.01 0.00 - 0.05 10*3/mm3    nRBC 0.0 0.0 - 0.2 /100 WBC   Ethanol    Collection Time: 04/28/25 10:32 AM    Specimen: Arm, Right; Blood   Result Value Ref Range    Ethanol <10 0 - 10 mg/dL    Ethanol % <0.010 %   High Sensitivity Troponin T 1Hr    Collection Time: 04/28/25 11:38 AM    Specimen: Arm, Right; Blood   Result Value Ref Range    HS Troponin T <6 <14 ng/L    Troponin T Numeric Delta     ECG 12 Lead ED Triage Standing Order; Chest Pain    Collection Time: 04/28/25 11:42 AM   Result Value Ref Range    QT Interval 346 ms    QTC Interval 406 ms       Ordered the  above labs and independently interpreted results. My findings will be discussed in the medical decision making section below        RADIOLOGY  US Gallbladder  Result Date: 4/28/2025  Right upper quadrant abdominal sonogram  TECHNIQUE: Grayscale and color Doppler images of the right upper quadrant of the abdomen were obtained.  HISTORY: Right upper quadrant pain  COMPARISON: CT abdomen pelvis 4/20/2025  FINDINGS:  The gallbladder is not distended. There is no evidence of cholelithiasis.  The common bile duct measures 4 mm in diameter. There is no intrahepatic biliary ductal dilation.  The liver has a normal sonographic appearance.  The right kidney measures 11 cm in length.  The right kidney demonstrates normal echogenicity and cortical thickness. There is no right-sided hydronephrosis. There are no right-sided cysts, masses or renal calculi.  The visualized portion of the pancreas is unremarkable.  Visualized portions of the aorta and IVC appear normal.      1.  Negative right upper quadrant sonogram  This report was finalized on 4/28/2025 4:17 PM by Dr. Jaquan Ignacio M.D on Workstation: [id number]      CT Abdomen Pelvis With Contrast  Result Date: 4/28/2025  CT ABDOMEN PELVIS W CONTRAST-  HISTORY: 60 years of age, Female.  abdominal pain  TECHNIQUE:  CT includes axial imaging from the lung bases to the trochanters with intravenous contrast and without use of oral contrast. Data reconstructed in coronal and sagittal planes. Radiation dose reduction techniques were utilized, including automated exposure control and exposure modulation based on body size.  COMPARISON: None  FINDINGS: There is linear subsegmental lower lobe atelectasis.  Gallbladder is mildly distended though there is no pericystic inflammation. The gallbladder is mildly distended though there is no evidence for wall thickening or pericholecystic inflammation. Further evaluation could be performed with right upper quadrant sonogram if indicated.  Spleen, adrenal glands, pancreas, kidneys exhibit normal CT appearance.  No finding to suggest appendicitis. No bowel dilatation or evidence for bowel obstruction. There are mildly air distended large and small bowel loops. No transition point. No ascites.  Small periumbilical hernia contains fat. Mild atherosclerotic calcifications are present involving the abdominal aorta and iliac vasculature.  At T8-9 there is a central to right posterior disc protrusion with mild to moderate narrowing of the central canal. There is also prominent posterior endplate spurring calcification associated the posterior longitudinal ligament at the T12-L1 and L1-2 levels. There has been right posterior decompression at the L1-2 level.      1. No evidence for acute intra-abdominal process. Bowel loops are mildly air distended though there is no bowel dilatation to suggest obstruction or evidence for appendicitis. 2. Mild atherosclerotic disease.  Radiation dose reduction techniques were utilized, including automated exposure control and exposure modulation based on body size.   This report was finalized on 4/28/2025 3:31 PM by Arvin Haynes M.D on Workstation: [id number]      XR Chest 1 View  Result Date: 4/28/2025  XR CHEST 1 VW-4/28/2025  HISTORY: Chest pain.  Heart size is within normal limits. Lungs appear free of acute infiltrates. Mediport catheter and cervical fusion plate are seen.      1. No acute process.   This report was finalized on 4/28/2025 10:14 AM by Dr. Jose Lassiter M.D on Workstation: [id number]        Ordered the above noted radiological studies.  Independently interpreted by me and my independent review of findings can be found in the ED Course.  See dictation for official radiology interpretation.      PROCEDURES    Procedures    EKG - Per my independent interpretation at 1148:    EKG Time: 1142  Rhythm/Rate: Sinus rhythm with rate of 82  Normal axis  Normal intervals  No acute ischemic changes  No STEMI        No emergent changes as compared to December 3, 2024      MEDICATIONS GIVEN IN ER    Medications   sodium chloride 0.9 % flush 10 mL (has no administration in time range)   aspirin tablet 325 mg (325 mg Oral Not Given 4/28/25 1035)   sodium chloride 0.9 % flush 10 mL (10 mL Intravenous Given 4/28/25 1235)   sodium chloride 0.9 % flush 10 mL (has no administration in time range)   sodium chloride 0.9 % flush 20 mL (has no administration in time range)   sodium chloride 0.9 % infusion 40 mL (has no administration in time range)   heparin injection 500 Units (has no administration in time range)   sodium chloride 0.9 % bolus 1,000 mL (0 mL Intravenous Stopped 4/28/25 1254)   ondansetron (ZOFRAN) injection 4 mg (4 mg Intravenous Given 4/28/25 1225)   HYDROmorphone (DILAUDID) injection 1 mg (1 mg Intravenous Given 4/28/25 1225)   iopamidol (ISOVUE-300) 61 % injection 85 mL (85 mL Intravenous Given by Other 4/28/25 1308)   ondansetron (ZOFRAN) injection 4 mg (4 mg Intravenous Given 4/28/25 1340)   HYDROmorphone (DILAUDID) injection 1 mg (1 mg Intravenous Given 4/28/25 1500)   droperidol (INAPSINE) injection 2.5 mg (2.5 mg Intravenous Given 4/28/25 1636)         PROGRESS, DATA ANALYSIS, CONSULTS, AND MEDICAL DECISION MAKING    Please note that this section constitutes my independent interpretation of clinical data including lab results, radiology, EKG's.  This constitutes my independent professional opinion regarding differential diagnosis and management of this patient.  It may include any factors such as history from outside sources, review of external records, social determinants of health, management of medications, response to those treatments, and discussions with other providers.    Differential Diagnosis and Plan: Initial concern for cholelithiasis, cholecystitis, gastritis, peptic ulcer disease, pancreatitis, esophagitis, colitis, constipation, dehydration, renal failure, electrolyte imbalance, anemia, among  others.  Consideration of but lower concern for ACS, PE, aortic dissection, pneumothorax plan for labs, supportive care, CT scan, chest x-ray, EKG, reevaluation with results.    Additional sources:  - Discussed/ obtained information from independent historians: EMS reports giving 324 mg of aspirin prior to arrival     - (Social Determinants of Health): None     - Shared decision making:  Patient fully updated on and in agreement with the course and plan moving forward    ED Course as of 04/28/25 1653   Mon Apr 28, 2025   1010 XR Chest 1 View  Per my independent interpretation of the chest x-ray, no evidence of pneumothorax, left-sided Mediport in place [DC]   1100 WBC: 8.27 [DC]   1100 Hemoglobin: 14.3 [DC]   1100 Platelets: 240 [DC]   1131 Glucose: 98 [DC]   1131 BUN: 17 [DC]   1131 Creatinine: 0.90 [DC]   1131 Sodium: 136 [DC]   1131 Potassium: 3.8 [DC]   1131 ALT (SGPT): 16 [DC]   1131 AST (SGOT): 23 [DC]   1131 Alkaline Phosphatase: 82 [DC]   1131 Total Bilirubin: 0.5 [DC]   1131 HS Troponin T: <6 [DC]   1131 Ethanol %: <0.010 [DC]   1336 CT Abdomen Pelvis With Contrast  Per my independent interpretation of the CT abdomen and pelvis, distended gallbladder without overtly evident cholelithiasis or cholecystitis, no overtly evident bowel obstruction, colitis, diverticulitis although subtle finding could be missed will defer to final radiology report [DC]   1430 CT Abdomen Pelvis With Contrast  Radiology report reviewed, no evidence of acute intra-abdominal process, no evidence of appendicitis or bowel obstruction.  Does have a mildly distended gallbladder but no pericystic inflammation or wall thickening [DC]   1633 Patient with intractable nausea and vomiting despite medications of multiple rounds.  About to try droperidol but will plan for hospitalization for further symptom control and management, she is agreeable to this plan moving forward. [DC]   1652 Discussed with Dr. Whitt [facility], discussed patient's  clinical course and findings today, treatment modalities, and need for hospitalization. [DC]      ED Course User Index  [DC] Tung Almazan MD       Hospitalization Considered?: yes    Orders Placed During This Visit:  Orders Placed This Encounter   Procedures    XR Chest 1 View    CT Abdomen Pelvis With Contrast    US Gallbladder    CT Outside Films    Wheelwright Draw    Comprehensive Metabolic Panel    High Sensitivity Troponin T    CBC Auto Differential    Ethanol    High Sensitivity Troponin T 1Hr    NPO Diet NPO Type: Strict NPO    Undress & Gown    Continuous Pulse Oximetry    Connectors / Hubs Must Be Scrubbed 15 Seconds Using 70% Alcohol Before Access - Allow to Dry Before Accessing Line    Change Dressing to IV Site As Needed When Damp, Loose or Soiled    Change Needleless Connectors    Change Thomas Needle & Transparent Dressing Every 7 Days    Change Thomas Needle As Needed    Daily CHG Bath While Central Line in Place    Discontinue Cardiac Monitoring    [facility] (on-call MD unless specified) Details    Oxygen Therapy- Nasal Cannula; Titrate 1-6 LPM Per SpO2; 90 - 95%    ECG 12 Lead ED Triage Standing Order; Chest Pain    ECG 12 Lead ED Triage Standing Order; Chest Pain    Insert Peripheral IV    Access VAD    Initiate Observation Status    CBC & Differential    Green Top (Gel)    Lavender Top    Gold Top - SST    Light Blue Top       Additional orders considered but not placed:      Independent interpretation of labs, radiology studies, and discussions with consultants: See ED Course        AS OF 16:53 EDT VITALS:    BP - 154/99  HR - 92  TEMP - 98.5 °F (36.9 °C) (Tympanic)  02 SATS - 94%          DIAGNOSIS  Final diagnoses:   Intractable nausea and vomiting   Pain of upper abdomen         DISPOSITION  ED Disposition       ED Disposition   Decision to Admit    Condition   --    Comment   Level of Care: Med/Surg [1]   Diagnosis: Intractable nausea and vomiting [963140]   Admitting Physician: CHAO NEWTON  [[id number]]   Attending Physician: CHAO NEWTON [[id number]]   Is patient appropriate for Inpatient Observation Unit?: No [0]                  Please note that portions of this document were completed with a voice recognition program.    Note Disclaimer: At [facility], we believe that sharing information builds trust and better relationships. You are receiving this note because you recently visited [facility]. It is possible you will see health information before a provider has talked with you about it. This kind of information can be easy to misunderstand. To help you fully understand what it means for your health, we urge you to discuss this note with your provider.                       Tung Almazan MD  04/28/25 4531

## 2025-04-29 ENCOUNTER — APPOINTMENT (OUTPATIENT)
Dept: NEUROLOGY | Facility: HOSPITAL | Age: 60
End: 2025-04-29
Payer: COMMERCIAL

## 2025-04-29 PROBLEM — R10.10 PAIN OF UPPER ABDOMEN: Status: ACTIVE | Noted: 2025-04-28

## 2025-04-29 LAB
AMPHET+METHAMPHET UR QL: NEGATIVE
AMPHETAMINES UR QL: NEGATIVE
ANION GAP SERPL CALCULATED.3IONS-SCNC: 9.1 MMOL/L (ref 5–15)
BARBITURATES UR QL SCN: NEGATIVE
BASOPHILS # BLD AUTO: 0.03 10*3/MM3 (ref 0–0.2)
BASOPHILS NFR BLD AUTO: 0.5 % (ref 0–1.5)
BENZODIAZ UR QL SCN: NEGATIVE
BUN SERPL-MCNC: 10 MG/DL (ref 6–20)
BUN/CREAT SERPL: 11.6 (ref 7–25)
BUPRENORPHINE SERPL-MCNC: NEGATIVE NG/ML
CALCIUM SPEC-SCNC: 8.6 MG/DL (ref 8.6–10.5)
CANNABINOIDS SERPL QL: POSITIVE
CHLORIDE SERPL-SCNC: 106 MMOL/L (ref 98–107)
CO2 SERPL-SCNC: 23.9 MMOL/L (ref 22–29)
COCAINE UR QL: NEGATIVE
CREAT SERPL-MCNC: 0.86 MG/DL (ref 0.57–1)
DEPRECATED RDW RBC AUTO: 51.2 FL (ref 37–54)
EGFRCR SERPLBLD CKD-EPI 2021: 77.9 ML/MIN/1.73
EOSINOPHIL # BLD AUTO: 0.13 10*3/MM3 (ref 0–0.4)
EOSINOPHIL NFR BLD AUTO: 2 % (ref 0.3–6.2)
ERYTHROCYTE [DISTWIDTH] IN BLOOD BY AUTOMATED COUNT: 14.2 % (ref 12.3–15.4)
FENTANYL UR-MCNC: NEGATIVE NG/ML
GLUCOSE BLDC GLUCOMTR-MCNC: 90 MG/DL (ref 70–130)
GLUCOSE SERPL-MCNC: 97 MG/DL (ref 65–99)
HCT VFR BLD AUTO: 37.7 % (ref 34–46.6)
HGB BLD-MCNC: 12.9 G/DL (ref 12–15.9)
IMM GRANULOCYTES # BLD AUTO: 0.01 10*3/MM3 (ref 0–0.05)
IMM GRANULOCYTES NFR BLD AUTO: 0.2 % (ref 0–0.5)
LIPASE SERPL-CCNC: 18 U/L (ref 13–60)
LYMPHOCYTES # BLD AUTO: 2.47 10*3/MM3 (ref 0.7–3.1)
LYMPHOCYTES NFR BLD AUTO: 37.3 % (ref 19.6–45.3)
MCH RBC QN AUTO: 34 PG (ref 26.6–33)
MCHC RBC AUTO-ENTMCNC: 34.2 G/DL (ref 31.5–35.7)
MCV RBC AUTO: 99.5 FL (ref 79–97)
METHADONE UR QL SCN: NEGATIVE
MONOCYTES # BLD AUTO: 0.69 10*3/MM3 (ref 0.1–0.9)
MONOCYTES NFR BLD AUTO: 10.4 % (ref 5–12)
NEUTROPHILS NFR BLD AUTO: 3.3 10*3/MM3 (ref 1.7–7)
NEUTROPHILS NFR BLD AUTO: 49.6 % (ref 42.7–76)
NRBC BLD AUTO-RTO: 0 /100 WBC (ref 0–0.2)
OPIATES UR QL: POSITIVE
OXYCODONE UR QL SCN: NEGATIVE
PCP UR QL SCN: NEGATIVE
PLATELET # BLD AUTO: 248 10*3/MM3 (ref 140–450)
PMV BLD AUTO: 9.9 FL (ref 6–12)
POTASSIUM SERPL-SCNC: 4.2 MMOL/L (ref 3.5–5.2)
QT INTERVAL: 346 MS
QT INTERVAL: 347 MS
QTC INTERVAL: 406 MS
QTC INTERVAL: 411 MS
RBC # BLD AUTO: 3.79 10*6/MM3 (ref 3.77–5.28)
SODIUM SERPL-SCNC: 139 MMOL/L (ref 136–145)
TRICYCLICS UR QL SCN: NEGATIVE
WBC NRBC COR # BLD AUTO: 6.63 10*3/MM3 (ref 3.4–10.8)

## 2025-04-29 PROCEDURE — 93010 ELECTROCARDIOGRAM REPORT: CPT | Performed by: INTERNAL MEDICINE

## 2025-04-29 PROCEDURE — 95819 EEG AWAKE AND ASLEEP: CPT | Performed by: STUDENT IN AN ORGANIZED HEALTH CARE EDUCATION/TRAINING PROGRAM

## 2025-04-29 PROCEDURE — 96376 TX/PRO/DX INJ SAME DRUG ADON: CPT

## 2025-04-29 PROCEDURE — 99244 OFF/OP CNSLTJ NEW/EST MOD 40: CPT | Performed by: NURSE PRACTITIONER

## 2025-04-29 PROCEDURE — 25010000002 SODIUM CHLORIDE 0.9 % WITH KCL 20 MEQ 20-0.9 MEQ/L-% SOLUTION: Performed by: INTERNAL MEDICINE

## 2025-04-29 PROCEDURE — 25010000002 HYDROMORPHONE PER 4 MG: Performed by: INTERNAL MEDICINE

## 2025-04-29 PROCEDURE — 25810000003 SODIUM CHLORIDE 0.9 % SOLUTION: Performed by: INTERNAL MEDICINE

## 2025-04-29 PROCEDURE — 82948 REAGENT STRIP/BLOOD GLUCOSE: CPT

## 2025-04-29 PROCEDURE — 95819 EEG AWAKE AND ASLEEP: CPT

## 2025-04-29 PROCEDURE — 97530 THERAPEUTIC ACTIVITIES: CPT

## 2025-04-29 PROCEDURE — 93005 ELECTROCARDIOGRAM TRACING: CPT | Performed by: INTERNAL MEDICINE

## 2025-04-29 PROCEDURE — 25010000002 PROCHLORPERAZINE 10 MG/2ML SOLUTION: Performed by: INTERNAL MEDICINE

## 2025-04-29 PROCEDURE — 96375 TX/PRO/DX INJ NEW DRUG ADDON: CPT

## 2025-04-29 PROCEDURE — 25010000002 ONDANSETRON PER 1 MG: Performed by: NURSE PRACTITIONER

## 2025-04-29 PROCEDURE — 83690 ASSAY OF LIPASE: CPT | Performed by: INTERNAL MEDICINE

## 2025-04-29 PROCEDURE — G0378 HOSPITAL OBSERVATION PER HR: HCPCS

## 2025-04-29 PROCEDURE — 97162 PT EVAL MOD COMPLEX 30 MIN: CPT

## 2025-04-29 PROCEDURE — 80307 DRUG TEST PRSMV CHEM ANLYZR: CPT | Performed by: INTERNAL MEDICINE

## 2025-04-29 PROCEDURE — 96361 HYDRATE IV INFUSION ADD-ON: CPT

## 2025-04-29 PROCEDURE — 25010000002 PROCHLORPERAZINE 10 MG/2ML SOLUTION: Performed by: NURSE PRACTITIONER

## 2025-04-29 PROCEDURE — 85025 COMPLETE CBC W/AUTO DIFF WBC: CPT | Performed by: INTERNAL MEDICINE

## 2025-04-29 PROCEDURE — 25010000002 ONDANSETRON PER 1 MG: Performed by: INTERNAL MEDICINE

## 2025-04-29 PROCEDURE — 99204 OFFICE O/P NEW MOD 45 MIN: CPT | Performed by: STUDENT IN AN ORGANIZED HEALTH CARE EDUCATION/TRAINING PROGRAM

## 2025-04-29 PROCEDURE — 80048 BASIC METABOLIC PNL TOTAL CA: CPT | Performed by: INTERNAL MEDICINE

## 2025-04-29 RX ORDER — PROCHLORPERAZINE EDISYLATE 5 MG/ML
5 INJECTION INTRAMUSCULAR; INTRAVENOUS ONCE
Status: COMPLETED | OUTPATIENT
Start: 2025-04-29 | End: 2025-04-29

## 2025-04-29 RX ORDER — ONDANSETRON 4 MG/1
4 TABLET, ORALLY DISINTEGRATING ORAL EVERY 6 HOURS PRN
Status: DISCONTINUED | OUTPATIENT
Start: 2025-04-29 | End: 2025-05-01 | Stop reason: HOSPADM

## 2025-04-29 RX ORDER — ONDANSETRON 2 MG/ML
4 INJECTION INTRAMUSCULAR; INTRAVENOUS EVERY 4 HOURS PRN
Status: DISCONTINUED | OUTPATIENT
Start: 2025-04-29 | End: 2025-04-29

## 2025-04-29 RX ORDER — ONDANSETRON 2 MG/ML
4 INJECTION INTRAMUSCULAR; INTRAVENOUS EVERY 6 HOURS PRN
Status: DISCONTINUED | OUTPATIENT
Start: 2025-04-29 | End: 2025-05-01 | Stop reason: HOSPADM

## 2025-04-29 RX ORDER — HYDROMORPHONE HYDROCHLORIDE 1 MG/ML
0.25 INJECTION, SOLUTION INTRAMUSCULAR; INTRAVENOUS; SUBCUTANEOUS EVERY 4 HOURS PRN
Status: DISCONTINUED | OUTPATIENT
Start: 2025-04-29 | End: 2025-04-30

## 2025-04-29 RX ORDER — PROCHLORPERAZINE EDISYLATE 5 MG/ML
5 INJECTION INTRAMUSCULAR; INTRAVENOUS EVERY 6 HOURS PRN
Status: DISCONTINUED | OUTPATIENT
Start: 2025-04-29 | End: 2025-05-01 | Stop reason: HOSPADM

## 2025-04-29 RX ORDER — SODIUM CHLORIDE 9 MG/ML
100 INJECTION, SOLUTION INTRAVENOUS CONTINUOUS
Status: ACTIVE | OUTPATIENT
Start: 2025-04-29 | End: 2025-04-30

## 2025-04-29 RX ADMIN — ONDANSETRON 4 MG: 2 INJECTION, SOLUTION INTRAMUSCULAR; INTRAVENOUS at 06:40

## 2025-04-29 RX ADMIN — POTASSIUM CHLORIDE AND SODIUM CHLORIDE 100 ML/HR: 900; 150 INJECTION, SOLUTION INTRAVENOUS at 07:51

## 2025-04-29 RX ADMIN — ONDANSETRON 4 MG: 2 INJECTION, SOLUTION INTRAMUSCULAR; INTRAVENOUS at 19:18

## 2025-04-29 RX ADMIN — FLUOXETINE 60 MG: 20 CAPSULE ORAL at 08:16

## 2025-04-29 RX ADMIN — PROCHLORPERAZINE EDISYLATE 5 MG: 5 INJECTION INTRAMUSCULAR; INTRAVENOUS at 14:57

## 2025-04-29 RX ADMIN — HYDROMORPHONE HYDROCHLORIDE 0.5 MG: 1 INJECTION, SOLUTION INTRAMUSCULAR; INTRAVENOUS; SUBCUTANEOUS at 06:40

## 2025-04-29 RX ADMIN — LEVETIRACETAM 1000 MG: 500 TABLET, FILM COATED ORAL at 20:36

## 2025-04-29 RX ADMIN — HYDROMORPHONE HYDROCHLORIDE 0.25 MG: 1 INJECTION, SOLUTION INTRAMUSCULAR; INTRAVENOUS; SUBCUTANEOUS at 14:50

## 2025-04-29 RX ADMIN — PROCHLORPERAZINE EDISYLATE 5 MG: 5 INJECTION INTRAMUSCULAR; INTRAVENOUS at 23:02

## 2025-04-29 RX ADMIN — TOPIRAMATE 200 MG: 100 TABLET, FILM COATED ORAL at 20:36

## 2025-04-29 RX ADMIN — Medication 10 ML: at 08:20

## 2025-04-29 RX ADMIN — LEVETIRACETAM 1000 MG: 500 TABLET, FILM COATED ORAL at 08:16

## 2025-04-29 RX ADMIN — HYDROMORPHONE HYDROCHLORIDE 0.5 MG: 1 INJECTION, SOLUTION INTRAMUSCULAR; INTRAVENOUS; SUBCUTANEOUS at 10:38

## 2025-04-29 RX ADMIN — HYDROMORPHONE HYDROCHLORIDE 0.25 MG: 1 INJECTION, SOLUTION INTRAMUSCULAR; INTRAVENOUS; SUBCUTANEOUS at 23:02

## 2025-04-29 RX ADMIN — ACETAMINOPHEN 650 MG: 650 SOLUTION ORAL at 13:34

## 2025-04-29 RX ADMIN — SODIUM CHLORIDE 100 ML/HR: 9 INJECTION, SOLUTION INTRAVENOUS at 15:07

## 2025-04-29 RX ADMIN — PROCHLORPERAZINE EDISYLATE 5 MG: 5 INJECTION INTRAMUSCULAR; INTRAVENOUS at 02:35

## 2025-04-29 RX ADMIN — TOPIRAMATE 200 MG: 100 TABLET, FILM COATED ORAL at 08:16

## 2025-04-29 RX ADMIN — HYDROMORPHONE HYDROCHLORIDE 0.25 MG: 1 INJECTION, SOLUTION INTRAMUSCULAR; INTRAVENOUS; SUBCUTANEOUS at 19:18

## 2025-04-29 RX ADMIN — HYDROMORPHONE HYDROCHLORIDE 0.5 MG: 1 INJECTION, SOLUTION INTRAMUSCULAR; INTRAVENOUS; SUBCUTANEOUS at 02:34

## 2025-04-29 RX ADMIN — ONDANSETRON 4 MG: 2 INJECTION, SOLUTION INTRAMUSCULAR; INTRAVENOUS at 10:38

## 2025-04-29 RX ADMIN — PANTOPRAZOLE SODIUM 40 MG: 40 INJECTION, POWDER, FOR SOLUTION INTRAVENOUS at 06:40

## 2025-04-29 RX ADMIN — PANTOPRAZOLE SODIUM 40 MG: 40 INJECTION, POWDER, FOR SOLUTION INTRAVENOUS at 16:44

## 2025-04-29 NOTE — CONSULTS
"Neurology Consult Note    Consult Date: 4/29/2025    Referring MD: No ref. provider found    Reason for Consult I have been asked to see the patient in neurological consultation to render advice and opinion regarding syncope    Noelle Mehta is a 60 y.o. white female with known diagnosis of seizure disorder on Keppra 1 g twice daily and Topamax 100 mg twice daily, right lung cancer diagnosed in 2023 status post lobectomy and chemotherapy last chemotherapy in 2024 presented to the hospital with right upper quadrant abdominal pain associated with nausea and vomiting, this morning she had an episode of severe abdominal pain followed by passing out, patient lost consciousness for less than 1 minute with no postictal confusion, no witnessed seizure-like activity no urinary, fecal incontinence or tongue bite.  Patient stated that she felt lightheaded prior to passing out. Currently she is awake alert and oriented x 3 and following commands appropriately, still complaining of abdominal and right knee pain.    Past Medical History:   Diagnosis Date    Epilepsy     H/O ETOH abuse     patient has been sober since jan/2018    Seizures        Exam  /100 (BP Location: Left arm, Patient Position: Lying)   Pulse 107   Temp 96.9 °F (36.1 °C) (Oral)   Resp 18   Ht 170.2 cm (67\")   Wt 102 kg (224 lb 3.3 oz)   SpO2 93%   BMI 35.12 kg/m²   Gen: NAD, vitals reviewed  MS: oriented x3, recent/remote memory intact, normal attention/concentration, language intact, no neglect.  CN: visual acuity grossly normal, PERRL, EOMI, no facial droop, no dysarthria  Motor: Moves all extremities against gravity without drifting, normal tone  Sensory exam: Normal to light touch, temperature, vibration throughout    DATA:    Lab Results   Component Value Date    GLUCOSE 97 04/29/2025    CALCIUM 8.6 04/29/2025     04/29/2025    K 4.2 04/29/2025    CO2 23.9 04/29/2025     04/29/2025    BUN 10 04/29/2025    CREATININE 0.86 04/29/2025    " EGFRIFAFRI >60 03/17/2023    EGFRIFNONA 85 04/17/2021    BCR 11.6 04/29/2025    ANIONGAP 9.1 04/29/2025     Lab Results   Component Value Date    WBC 6.63 04/29/2025    HGB 12.9 04/29/2025    HCT 37.7 04/29/2025    MCV 99.5 (H) 04/29/2025     04/29/2025       Lab review: Above labs reviewed    Imaging review: No recent brain imaging to review    Diagnoses:  -Syncope likely vasovagal triggered by severe abdominal pain, rule out seizure giving the known diagnosis of epilepsy  - History of lung cancer  - Abdominal pain with nausea and vomiting      PLAN:   -Check orthostatics  -EEG  -Continue Keppra and Topamax home dose  -Seizure precautions  -MRI brain with and without contrast giving the history of lung cancer to rule out brain metastasis  -Workup for abdominal pain nausea and vomiting as per the primary    Medical Decision Making for this neurology consultation consists of the following:  Review of previous chart, including H/P, provider and nursing notes as applicable.  Review of medications and vital signs.  Review of previous labs, neuroimaging, and additional relevant diagnostics.   Interpretation of laboratory, imaging, and other diagnostic results  Discussion with other providers and family   Total face-to-face/floor time: 30-61 minutes.

## 2025-04-29 NOTE — THERAPY EVALUATION
Patient Name: Noelle Mehta  : 1965    MRN: [id number]                              Today's Date: 2025       Admit Date: 2025    Visit Dx:     ICD-10-CM ICD-9-CM   1. Intractable nausea and vomiting  R11.2 536.2   2. Pain of upper abdomen  R10.10 789.09   3. Intentional overdose, subsequent encounter  T50.902D V58.89     977.9     Patient Active Problem List   Diagnosis    Intentional overdose    Intractable nausea and vomiting    Pain of upper abdomen     Past Medical History:   Diagnosis Date    Epilepsy     H/O ETOH abuse     patient has been sober since     Seizures      Past Surgical History:   Procedure Laterality Date    BACK SURGERY        General Information       Row Name 25 1555          Physical Therapy Time and Intention    Document Type evaluation  -EJ     Mode of Treatment physical therapy  -EJ       Row Name 25 1550          General Information    Patient Profile Reviewed yes  -EJ     Prior Level of Function independent:;all household mobility;ADL's  uses a cane  -EJ     Existing Precautions/Restrictions fall  -EJ     Barriers to Rehab medically complex  -EJ       Row Name 25 1555          Living Environment    Current Living Arrangements apartment  -EJ     People in Home child(karan), adult  -EJ       Row Name 25 1555          Cognition    Orientation Status (Cognition) oriented x 4  -EJ       Row Name 25 1558          Safety Issues/Impairments Affecting Functional Mobility    Impairments Affecting Function (Mobility) strength  -EJ               User Key  (r) = Recorded By, (t) = Taken By, (c) = Cosigned By      Initials Name Provider Type    EJ Urvashi Leyva, PT Physical Therapist                   Mobility       Row Name 25 1556          Bed Mobility    Bed Mobility supine-sit  -EJ     Supine-Sit [place] (Bed Mobility) standby assist  -EJ     Assistive Device (Bed Mobility) head of bed elevated;bed rails  -EJ       Row Name 25  1556          Sit-Stand Transfer    Sit-Stand [place] (Transfers) verbal cues;contact guard  -EJ     Assistive Device (Sit-Stand Transfers) cane, quad tip  -EJ       Row Name 04/29/25 1556          Gait/Stairs (Locomotion)    [place] Level (Gait) contact guard  -EJ     Assistive Device (Gait) cane, quad tip  -EJ     Distance in Feet (Gait) 90  -EJ     Deviations/Abnormal Patterns (Gait) yelitza decreased;stride length decreased  -EJ     Comment, (Gait/Stairs) slow pace, no overt unsteadiness noted  -EJ               User Key  (r) = Recorded By, (t) = Taken By, (c) = Cosigned By      Initials Name Provider Type     Urvashi Leyva, PT Physical Therapist                   Obj/Interventions       [facility] Name 04/29/25 1557          Range of Motion Comprehensive    General Range of Motion no range of motion deficits identified  -EJ       Row Name 04/29/25 1557          Strength Comprehensive (MMT)    Comment, General Manual Muscle Testing (MMT) Assessment generalized weakness  -EJ       Row Name 04/29/25 1557          Balance    Balance Assessment sitting static balance;standing static balance;standing dynamic balance  -EJ     Static Sitting Balance independent  -EJ     Position, Sitting Balance sitting edge of bed  -EJ     Static Standing Balance standby assist  -EJ     Dynamic Standing Balance contact guard  -EJ     Position/Device Used, Standing Balance cane, quad  -EJ               User Key  (r) = Recorded By, (t) = Taken By, (c) = Cosigned By      Initials Name Provider Type     Urvashi Leyva, PT Physical Therapist                   Goals/Plan       Row Name 04/29/25 1614          Transfer Goal 1 (PT)    Activity/Assistive Device (Transfer Goal 1, PT) transfers, all;cane, quad  -EJ     [place] Level/Cues Needed (Transfer Goal 1, PT) supervision required  -EJ     Time Frame (Transfer Goal 1, PT) 1 week  -[facility] Name 04/29/25 1614          Gait Training Goal 1 (PT)    Activity/Assistive  Device (Gait Training Goal 1, PT) gait (walking locomotion);cane, quad  -EJ     [place] Level (Gait Training Goal 1, PT) standby assist  -EJ     Distance (Gait Training Goal 1, PT) 150  -EJ     Time Frame (Gait Training Goal 1, PT) 1 week  -EJ       Row Name 04/29/25 4636          Therapy Assessment/Plan (PT)    Planned Therapy Interventions (PT) balance training;bed mobility training;gait training;home exercise program;stretching;strengthening;stair training;ROM (range of motion);patient/family education;transfer training  -EJ               User Key  (r) = Recorded By, (t) = Taken By, (c) = Cosigned By      Initials Name Provider Type    EJ Urvashi Leyva, PT Physical Therapist                   Clinical Impression       Row Name 04/29/25 2207          Pain    Pretreatment Pain Rating 0/10 - no pain  -EJ     Posttreatment Pain Rating 0/10 - no pain  -EJ       Row Name 04/29/25 4472          Plan of Care Review    Plan of Care Reviewed With patient;child  -     Outcome Evaluation Pt seen for PT eval this afternoon. She was admitted to [facility] yesterday w intractable N/V.  Pt has hx of seizures and lung cancer. At baseline, pt lives at home w her daughter and granddaughter. She reports independence w mobility and ADLs and uses quad cane for ambulation. Earlier today, pt had a syncopal episode w c/o increased abdominal pain, but reports she is feeling better now. She is agreeable to work w therapy. Pt able to transition to EOB w SBA. SHe stood w CGA using cane and was able to ambulate approx 90 ft w CGA. She demos slow pace, but no unsteadiness. She reports that it feels good to be out of bed and moving. Pt returned to room, sitting EOB, with daughter present in room and bed alarm in place. Pt plans home upon DC w family. Pt also encouraged to ambulate w [facility] staff. PT will continue to follow to ensure safety and maximize strength and independence w mobility.  -       Row Name 04/29/25 0191          Therapy  Assessment/Plan (PT)    Rehab Potential (PT) good  -EJ     Criteria for Skilled Interventions Met (PT) yes  -EJ     Therapy Frequency (PT) 3 times/wk  -EJ       Row Name 04/29/25 1558          Positioning and Restraints    Pre-Treatment Position in bed  -EJ     Post Treatment Position bed  -EJ     In Bed notified nsg;sitting EOB;call light within reach;encouraged to call for assist;exit alarm on;with family/caregiver  -EJ               User Key  (r) = Recorded By, (t) = Taken By, (c) = Cosigned By      Initials Name Provider Type    Urvashi Boykin, PT Physical Therapist                   Outcome Measures       Row Name 04/29/25 1614 04/29/25 0750       How much help from another person do you currently need...    Turning from your back to your side while in flat bed without using bedrails? 4  -EJ 4  -CH    Moving from lying on back to sitting on the side of a flat bed without bedrails? 4  -EJ 4  -CH    Moving to and from a bed to a chair (including a wheelchair)? 4  -EJ 4  -CH    Standing up from a chair using your arms (e.g., wheelchair, bedside chair)? 4  -EJ 4  -CH    Climbing 3-5 steps with a railing? 3  -EJ 3  -CH    To walk in hospital room? 4  -EJ 3  -CH    AM-PAC 6 Clicks Score (PT) 23  -EJ 22  -CH              User Key  (r) = Recorded By, (t) = Taken By, (c) = Cosigned By      Initials Name Provider Type    Urvashi Boykin, PT Physical Therapist    Kassie Dumont RN Registered Nurse                                   PT Recommendation and Plan  Planned Therapy Interventions (PT): balance training, bed mobility training, gait training, home exercise program, stretching, strengthening, stair training, ROM (range of motion), patient/family education, transfer training  Outcome Evaluation: Pt seen for PT eval this afternoon. She was admitted to [facility] yesterday w intractable N/V.  Pt has hx of seizures and lung cancer. At baseline, pt lives at home w her daughter and granddaughter. She reports  independence w mobility and ADLs and uses quad cane for ambulation. Earlier today, pt had a syncopal episode w c/o increased abdominal pain, but reports she is feeling better now. She is agreeable to work w therapy. Pt able to transition to EOB w SBA. SHe stood w CGA using cane and was able to ambulate approx 90 ft w CGA. She demos slow pace, but no unsteadiness. She reports that it feels good to be out of bed and moving. Pt returned to room, sitting EOB, with daughter present in room and bed alarm in place. Pt plans home upon DC w family. Pt also encouraged to ambulate w nsg staff. PT will continue to follow to ensure safety and maximize strength and independence w mobility.     Time Calculation:         PT Charges       Row Name 04/29/25 1615             Time Calculation    Start Time 1516  -EJ      Stop Time 1540  -EJ      Time Calculation (min) 24 min  -EJ      PT Received On 04/29/25  -EJ      PT - Next Appointment 05/01/25  -EJ      PT Goal Re-Cert Due Date 05/06/25  -EJ         Time Calculation- PT    Total Timed Code Minutes- PT 18 minute(s)  -EJ                User Key  (r) = Recorded By, (t) = Taken By, (c) = Cosigned By      Initials Name Provider Type    EJ Urvashi Leyva, PT Physical Therapist                  Therapy Charges for Today       Code Description Service Date Service Provider Modifiers Qty    97101898370  PT EVAL MOD COMPLEXITY 3 4/29/2025 Urvashi Leyva, PT GP 1    [id number]  PT THERAPEUTIC ACT EA 15 MIN 4/29/2025 Urvashi Leyva, PT GP 1            PT G-Codes  AM-PAC 6 Clicks Score (PT): 23  PT Discharge Summary  Anticipated Discharge Disposition (PT): home with assist    Urvashi Leyva PT  4/29/2025

## 2025-04-29 NOTE — CONSULTS
Gastroenterology   Initial Inpatient Consult Note    Referring Provider: Sadia Whitt MD     Reason for Consultation: Vomiting and abdominal pain    Subjective     Thank you for the opportunity to participate in this patient's consultation and care.    History of present illness:    Gastroenterology service was consulted on this 60 y.o. female for progressive worsening of nausea and vomiting with upper quadrants abdominal pain over the last several days.  Her medical history includes a past diagnosis of lung cancer, which is now in remission; she has not required radiation or chemotherapy treatment.     - The pain is localized to the right upper quadrant, with mildly diffuse radiation across the upper abdomen.  She reports feeling hungry constantly, even shortly after eating, and notes that food provides little relief from her stomach discomfort.  Denies difficulty swallowing.  Reports persistent nausea with intermittent vomiting.  She is currently on Protonix twice daily and received IV fluid    - Her symptoms began several days ago and have persisted since.  She has been taking ibuprofen approximately every 4 hours for the past 5 days due to headache.  She denies alcohol consumption, other than occasional social drinking.  She has never undergone an EGD.    - Gallbladder ultrasound 4/28/2025 was unremarkable, showing no evidence of gallstones, normal common bile duct, and normal liver appearance.    - CT abdomen and pelvis showed no acute abnormalities and ruled out bowel obstruction.    - EKG is normal, with sinus rhythm    Results Reviewed:    4/29/2025  - Lipase normal  - Hemoglobin 12.9  - WBC 6.63    4/28/2025  -Ethanol normal  -CMP (normal LFTs, ALK, TB)    US Gallbladder (04/28/2025 15:27)   - Normal common bile duct  - No intrahepatic biliary ductal dilatation  - Normal liver sonographic appearance  - No evidence of cholelithiasis  (Refer to report for complete details)    CT Abdomen Pelvis With  Contrast (04/28/2025 13:09)   - No finding for appendicitis  - Small periumbilical hernia contains fat  - No evidence for acute intra-abdominal process  - Bowel loops are mildly distended without evidence of bowel dilatation to suggest obstruction      Past Surgical History:  Past Surgical History:   Procedure Laterality Date    BACK SURGERY        Social History:   Social History     Tobacco Use    Smoking status: Every Day     Types: Cigarettes    Smokeless tobacco: Never   Substance Use Topics    Alcohol use: No     Comment: sober since 1/2018      Family History:  History reviewed. No pertinent family history.    Home Meds:  Medications Prior to Admission   Medication Sig Dispense Refill Last Dose/Taking    albuterol sulfate  (90 Base) MCG/ACT inhaler Inhale 2 puffs Every 6 (Six) Hours As Needed.   Taking As Needed    FLUoxetine (PROzac) 20 MG capsule Take 3 capsules by mouth Daily.   Taking    levETIRAcetam (KEPPRA) 1000 MG tablet Take 1 tablet by mouth 2 (Two) Times a Day.   Taking    topiramate (TOPAMAX) 200 MG tablet Take 1 tablet by mouth Every 12 (Twelve) Hours.   Taking    Conj Estrog-Medroxyprogest Ace (PREMPRO PO) Take  by mouth.       folic acid (FOLVITE) 1 MG tablet Take 1 tablet by mouth Daily.       nicotine (NICODERM CQ) 21 MG/24HR patch Place 1 patch on the skin as directed by provider Daily.       omeprazole (priLOSEC) 40 MG capsule Take 1 capsule by mouth Daily.        Current Meds:   aspirin, 325 mg, Oral, Once  FLUoxetine, 60 mg, Oral, Daily  levETIRAcetam, 1,000 mg, Oral, BID  pantoprazole, 40 mg, Intravenous, BID AC  sodium chloride, 10 mL, Intravenous, Q12H  topiramate, 200 mg, Oral, Q12H      Allergies:  Allergies   Allergen Reactions    Codeine Anaphylaxis    Fentanyl Unknown - High Severity    Lactose Intolerance (Gi) Unknown - Low Severity    Naltrexone Unknown - Low Severity    Latex Swelling and Rash       Objective     Vital Signs  Temp:  [97 °F (36.1 °C)-98.9 °F (37.2 °C)]  98.9 °F (37.2 °C)  Heart Rate:  [] 99  Resp:  [18] 18  BP: (128-171)/() 128/84      Physical Exam  Vitals and nursing note reviewed.   Constitutional:       General: She is not in acute distress.     Appearance: Normal appearance. She is obese. She is not ill-appearing, toxic-appearing or diaphoretic.   Eyes:      General: No scleral icterus.     Conjunctiva/sclera: Conjunctivae normal.   Pulmonary:      Effort: Pulmonary effort is normal.   Abdominal:      General: Abdomen is flat. Bowel sounds are normal. There is no distension.      Palpations: Abdomen is soft. There is no mass.      Tenderness: There is abdominal tenderness. There is no right CVA tenderness, left CVA tenderness, guarding or rebound.      Hernia: No hernia is present.   Skin:     Coloration: Skin is not jaundiced or pale.      Findings: No erythema or rash.   Neurological:      Mental Status: She is alert and oriented to person, place, and time. Mental status is at baseline.   Psychiatric:         Mood and Affect: Mood normal.         Thought Content: Thought content normal.         Judgment: Judgment normal.            Results Review:     Results from last 7 days   Lab Units 04/29/25  0650 04/28/25  1032   WBC 10*3/mm3 6.63 8.27   HEMOGLOBIN g/dL 12.9 14.3   HEMATOCRIT % 37.7 42.7   PLATELETS 10*3/mm3 248 240     Results from last 7 days   Lab Units 04/29/25  0650 04/28/25  1032   SODIUM mmol/L 139 136   POTASSIUM mmol/L 4.2 3.8   CHLORIDE mmol/L 106 102   CO2 mmol/L 23.9 23.0   BUN mg/dL 10 17   CREATININE mg/dL 0.86 0.90   CALCIUM mg/dL 8.6 9.6   BILIRUBIN mg/dL  --  0.5   ALK PHOS U/L  --  82   ALT (SGPT) U/L  --  16   AST (SGOT) U/L  --  23   GLUCOSE mg/dL 97 98         Lab Results   Lab Value Date/Time    LIPASE 18 04/29/2025 0650    LIPASE 13 03/19/2024 0551    LIPASE 22 03/04/2024 1501    LIPASE 15 02/26/2023 2102    LIPASE 19 02/22/2023 1457       Radiology:  US Gallbladder   Final Result   1.  Negative right upper quadrant  sonogram       This report was finalized on 4/28/2025 4:17 PM by Dr. Jaquan Ignacio M.D   on Workstation: [id number]          CT Abdomen Pelvis With Contrast   Final Result   1. No evidence for acute intra-abdominal process. Bowel loops are mildly   air distended though there is no bowel dilatation to suggest obstruction   or evidence for appendicitis.   2. Mild atherosclerotic disease.       Radiation dose reduction techniques were utilized, including automated   exposure control and exposure modulation based on body size.           This report was finalized on 4/28/2025 3:31 PM by Arvin Haynes M.D   on Workstation: [id number]          XR Chest 1 View   Final Result   1. No acute process.           This report was finalized on 4/28/2025 10:14 AM by Dr. Jose Lassiter M.D on Workstation: [id number]          CT Outside Films   Final Result            Assessment & Plan   Active Hospital Problems    Diagnosis     **Intractable nausea and vomiting        Assessment:  Right upper quadrant abdominal pain > epigastric   Persistent nausea with intermittent vomiting  Frequent NSAID use  Suspect acute gastritis    Plan:  - Recommend avoiding NSAIDs in the future  - Continue PPI twice daily  - Continue supportive therapy for her nausea, Zofran as needed  GI soft diet, advance as tolerates  - Recommend EGD in AM, 4/30 to further evaluate  - N.p.o. after midnight      I discussed the patients findings and my recommendations with patient and nursing staff. Gastroenterology team is available for concerns or questions.             Ayla Glynn DNP (Nina), APRN  Dual Certified Family & Adult-Pedro Acute Care Nurse Practitioner  [facility] [place] [address]  Office: [phone]

## 2025-04-29 NOTE — PROGRESS NOTES
Discharge Planning Assessment  [facility]     Patient Name: Noelle Mehta  MRN: [id number]  Today's Date: 4/29/2025    Admit Date: 4/28/2025    Plan: Home   Discharge Needs Assessment       Row Name 04/29/25 1415       Living Environment    People in Home child(karan), adult;grandchild(karan)    Current Living Arrangements apartment    Primary Care Provided by self    Provides Primary Care For no one    Family Caregiver if Needed child(karan), adult    Quality of Family Relationships helpful;involved;supportive    Able to Return to Prior Arrangements yes       Resource/Environmental Concerns    Resource/Environmental Concerns none       Transition Planning    Patient/Family Anticipates Transition to home with family       Discharge Needs Assessment    Equipment Currently Used at Home cane, quad tip    Provided Post Acute Provider List? N/A    N/A Provider List Comment Denies needs. Plan is home                   Discharge Plan       Row Name 04/29/25 1416       Plan    Plan Home    Plan Comments Introduced self and role of CCP. Facesheet verified. Patient stated she does not has PCP. Provided patient with [facility] [place] contact information. Patient stated she lives in an apartment with her dgt, her granddgt and a roommate. Stated she is independent with ADL's and uses a quad cane at all times. Patient would not provide any additional information stating she was too sick to answer questions at this time. Patient did state she would be going back home at [place]. CCP will continued to follow fro poss needs/equipment.                       Demographic Summary       Row Name 04/29/25 1419       General Information    Admission Type observation    Arrived From home    Required Notices Provided Observation Status Notice    Reason for Consult discharge planning    Preferred Language English                   Functional Status       Row Name 04/29/25 1414       Functional Status    Usual Activity Tolerance good    Current Activity  Tolerance moderate       Functional Status, IADL    Medications independent    Meal Preparation independent    Housekeeping independent    Laundry independent    Shopping independent                   Psychosocial       Row Name 04/29/25 1414       Values/Beliefs    Spiritual, Cultural Beliefs, Sabianist Practices, Values that Affect Care no       Speech WDL    Speech WDL WDL       Coping/Stress    Sources of Support adult child(karan);friend(s)    Understanding of Condition and Treatment partial understanding of treatment       Developmental Stage (Eriksson's Stages of Development)    Developmental Stage Stage 7 (35-65 years/Middle Adulthood) Generativity vs. Stagnation               Alison Briscoe, RN

## 2025-04-29 NOTE — PLAN OF CARE
Problem: Adult Inpatient Plan of Care  Goal: Plan of Care Review  Outcome: Progressing  Flowsheets (Taken 4/29/2025 8855)  Progress: no change  Outcome Evaluation: alert and oriented, has been calm and cooperative, c/o frequent RLQ pain and nausea, medicated with IV Dilaudid x2, Zofran x1 and Compazine x1, with adeqaute relief, GI consult placed, voiding, no BM during the night, on Clear liquid diet, falls precaution and seizure precaution maintained, VSS  Plan of Care Reviewed With: patient   Goal Outcome Evaluation:  Plan of Care Reviewed With: patient        Progress: no change  Outcome Evaluation: alert and oriented, has been calm and cooperative, c/o frequent RLQ pain and nausea, medicated with IV Dilaudid x2, Zofran x1 and Compazine x1, with adeqaute relief, GI consult placed, voiding, no BM during the night, on Clear liquid diet, falls precaution and seizure precaution maintained, VSS

## 2025-04-29 NOTE — CODE DOCUMENTATION
Rapid called per nursing staff,pt had syncopal event, pt alert and vitals stable on arrival,   will be paged and updated by primary RN on events.

## 2025-04-29 NOTE — PLAN OF CARE
Goal Outcome Evaluation:  Plan of Care Reviewed With: patient, child           Outcome Evaluation: Pt seen for PT antonetteal this afternoon. She was admitted to [facility] yesterday w intractable N/V.  Pt has hx of seizures and lung cancer. At baseline, pt lives at home w her daughter and granddaughter. She reports independence w mobility and ADLs and uses quad cane for ambulation. Earlier today, pt had a syncopal episode w c/o increased abdominal pain, but reports she is feeling better now. She is agreeable to work w therapy. Pt able to transition to EOB w SBA. SHe stood w CGA using cane and was able to ambulate approx 90 ft w CGA. She demos slow pace, but no unsteadiness. She reports that it feels good to be out of bed and moving. Pt returned to room, sitting EOB, with daughter present in room and bed alarm in place. Pt plans home upon DC w family. Pt also encouraged to ambulate w nsg staff. PT will continue to follow to ensure safety and maximize strength and independence w mobility.    Anticipated Discharge Disposition (PT): home with assist

## 2025-04-29 NOTE — H&P
HISTORY AND PHYSICAL   [facility]        Date of Admission: 2025  Patient Identification:  Name: Noelle Mehta  Age: 60 y.o.  Sex: female  :  1965  MRN: [id number]                     Primary Care Physician: Provider, No Known    Chief Complaint:  60 year old female presented to the emergency room due to nausea and vomiting as well as right upper quadrant pain which started 3-4 days ago; she has not been able to eat; denies fever or chills; no diarrhea; symptoms have worsened since they started last week;no visitors are present with her    History of Present Illness:   As above    Past Medical History:  Past Medical History:   Diagnosis Date    Epilepsy     H/O ETOH abuse     patient has been sober since     Seizures      Past Surgical History:  Past Surgical History:   Procedure Laterality Date    BACK SURGERY        Home Meds:  Medications Prior to Admission   Medication Sig Dispense Refill Last Dose/Taking    albuterol sulfate  (90 Base) MCG/ACT inhaler Inhale 2 puffs Every 6 (Six) Hours As Needed.   Taking As Needed    FLUoxetine (PROzac) 20 MG capsule Take 3 capsules by mouth Daily.   Taking    levETIRAcetam (KEPPRA) 1000 MG tablet Take 1 tablet by mouth 2 (Two) Times a Day.   Taking    topiramate (TOPAMAX) 200 MG tablet Take 1 tablet by mouth Every 12 (Twelve) Hours.   Taking    Conj Estrog-Medroxyprogest Ace (PREMPRO PO) Take  by mouth.       folic acid (FOLVITE) 1 MG tablet Take 1 tablet by mouth Daily.       nicotine (NICODERM CQ) 21 MG/24HR patch Place 1 patch on the skin as directed by provider Daily.       omeprazole (priLOSEC) 40 MG capsule Take 1 capsule by mouth Daily.          Allergies:  Allergies   Allergen Reactions    Codeine Anaphylaxis    Fentanyl Unknown - High Severity    Lactose Intolerance (Gi) Unknown - Low Severity    Naltrexone Unknown - Low Severity    Latex Swelling and Rash     Immunizations:    There is no immunization history on file for this  "patient.  Social History:   Social History     Social History Narrative    Not on file     Social History     Socioeconomic History    Marital status:    Tobacco Use    Smoking status: Every Day     Types: Cigarettes    Smokeless tobacco: Never   Vaping Use    Vaping status: Never Used   Substance and Sexual Activity    Alcohol use: No     Comment: sober since 2018    Drug use: No    Sexual activity: Defer       Family History:  History reviewed. No pertinent family history.     Review of Systems  See history of present illness and past medical history.  Patient denies headache, dizziness, syncope, falls, trauma, change in vision, change in hearing, change in taste, changes in weight, changes in appetite, focal weakness, numbness, or paresthesia.  Patient denies   palpitations, dyspnea, orthopnea, PND, cough, sinus pressure, rhinorrhea, epistaxis, hemoptysis,  ,hematemesis, diarrhea, constipation or hematochezia.  Denies cold or heat intolerance, polydipsia, polyuria, polyphagia. Denies hematuria, pyuria, dysuria, hesitancy, frequency or urgency. Denies consumption of raw and under cooked meats foods or change in water source.  Denies fever, chills, sweats, night sweats.       Objective:  T Max 24 hrs: Temp (24hrs), Av.8 °F (36.6 °C), Min:97 °F (36.1 °C), Max:98.5 °F (36.9 °C)    Vitals Ranges:   Temp:  [97 °F (36.1 °C)-98.5 °F (36.9 °C)] 97 °F (36.1 °C)  Heart Rate:  [] 89  Resp:  [18] 18  BP: (136-171)/() 137/86      Exam:  /86 (BP Location: Left arm, Patient Position: Lying)   Pulse 89   Temp 97 °F (36.1 °C) (Oral)   Resp 18   Ht 170.2 cm (67\")   Wt 102 kg (224 lb 3.3 oz)   SpO2 95%   BMI 35.12 kg/m²     General Appearance:    Alert, cooperative, no distress, appears stated age   Head:    Normocephalic, without obvious abnormality, atraumatic   Eyes:    PERRL, conjunctivae/corneas clear, EOM's intact, both eyes   Ears:    Normal external ear canals, both ears   Nose:   Nares " normal, septum midline, mucosa normal, no drainage    or sinus tenderness   Throat:   Lips, mucosa, and tongue normal   Neck:   Supple, symmetrical, trachea midline, no adenopathy;     thyroid:  no enlargement/tenderness/nodules; no carotid    bruit or JVD   Back:     Symmetric, no curvature, ROM normal, no CVA tenderness   Lungs:     Clear to auscultation bilaterally, respirations unlabored   Chest Wall:    No tenderness or deformity    Heart:    Regular rate and rhythm, S1 and S2 normal, no murmur, rub   or gallop   Abdomen:     Soft, nontender, bowel sounds active all four quadrants,     no masses, no hepatomegaly, no splenomegaly   Extremities:   Extremities normal, atraumatic, no cyanosis or edema                       .    Data Review:  Labs in chart were reviewed.  WBC   Date Value Ref Range Status   04/28/2025 8.27 3.40 - 10.80 10*3/mm3 Final     Hemoglobin   Date Value Ref Range Status   04/28/2025 14.3 12.0 - 15.9 g/dL Final     Hematocrit   Date Value Ref Range Status   04/28/2025 42.7 34.0 - 46.6 % Final     Platelets   Date Value Ref Range Status   04/28/2025 240 140 - 450 10*3/mm3 Final     Sodium   Date Value Ref Range Status   04/28/2025 136 136 - 145 mmol/L Final     Potassium   Date Value Ref Range Status   04/28/2025 3.8 3.5 - 5.2 mmol/L Final     Chloride   Date Value Ref Range Status   04/28/2025 102 98 - 107 mmol/L Final     CO2   Date Value Ref Range Status   04/28/2025 23.0 22.0 - 29.0 mmol/L Final     BUN   Date Value Ref Range Status   04/28/2025 17 6 - 20 mg/dL Final     Creatinine   Date Value Ref Range Status   04/28/2025 0.90 0.57 - 1.00 mg/dL Final     Glucose   Date Value Ref Range Status   04/28/2025 98 65 - 99 mg/dL Final     Calcium   Date Value Ref Range Status   04/28/2025 9.6 8.6 - 10.5 mg/dL Final                Imaging Results (All)       Procedure Component Value Units Date/Time    US Gallbladder [[id number]] Collected: 04/28/25 1544     Updated: 04/28/25 1620    Narrative:       Right upper quadrant abdominal sonogram     TECHNIQUE: Grayscale and color Doppler images of the right upper  quadrant of the abdomen were obtained.     HISTORY: Right upper quadrant pain     COMPARISON: CT abdomen pelvis 4/20/2025     FINDINGS:     The gallbladder is not distended. There is no evidence of  cholelithiasis.     The common bile duct measures 4 mm in diameter. There is no intrahepatic  biliary ductal dilation.     The liver has a normal sonographic appearance.     The right kidney measures 11 cm in length.  The right kidney  demonstrates normal echogenicity and cortical thickness. There is no  right-sided hydronephrosis. There are no right-sided cysts, masses or  renal calculi.     The visualized portion of the pancreas is unremarkable.     Visualized portions of the aorta and IVC appear normal.       Impression:      1.  Negative right upper quadrant sonogram     This report was finalized on 4/28/2025 4:17 PM by Dr. Jaquan Ignacio M.D  on Workstation: [id number]       CT Outside Films [[id number]] Resulted: 04/28/25 1539     Updated: 04/28/25 1539    Narrative:      This procedure was auto-finalized with no dictation required.    CT Abdomen Pelvis With Contrast [[id number]] Collected: 04/28/25 1424     Updated: 04/28/25 1534    Narrative:      CT ABDOMEN PELVIS W CONTRAST-     HISTORY: 60 years of age, Female.  abdominal pain     TECHNIQUE:  CT includes axial imaging from the lung bases to the  trochanters with intravenous contrast and without use of oral contrast.  Data reconstructed in coronal and sagittal planes. Radiation dose  reduction techniques were utilized, including automated exposure control  and exposure modulation based on body size.     COMPARISON: None     FINDINGS: There is linear subsegmental lower lobe atelectasis.     Gallbladder is mildly distended though there is no pericystic  inflammation. The gallbladder is mildly distended though there is no  evidence for wall thickening or  pericholecystic inflammation. Further  evaluation could be performed with right upper quadrant sonogram if  indicated. Spleen, adrenal glands, pancreas, kidneys exhibit normal CT  appearance.     No finding to suggest appendicitis. No bowel dilatation or evidence for  bowel obstruction. There are mildly air distended large and small bowel  loops. No transition point. No ascites.     Small periumbilical hernia contains fat. Mild atherosclerotic  calcifications are present involving the abdominal aorta and iliac  vasculature.     At T8-9 there is a central to right posterior disc protrusion with mild  to moderate narrowing of the central canal. There is also prominent  posterior endplate spurring calcification associated the posterior  longitudinal ligament at the T12-L1 and L1-2 levels. There has been  right posterior decompression at the L1-2 level.       Impression:      1. No evidence for acute intra-abdominal process. Bowel loops are mildly  air distended though there is no bowel dilatation to suggest obstruction  or evidence for appendicitis.  2. Mild atherosclerotic disease.     Radiation dose reduction techniques were utilized, including automated  exposure control and exposure modulation based on body size.        This report was finalized on 4/28/2025 3:31 PM by Arvin Haynes M.D  on Workstation: [id number]       XR Chest 1 View [[id number]] Collected: 04/28/25 1014     Updated: 04/28/25 1017    Narrative:      XR CHEST 1 VW-4/28/2025     HISTORY: Chest pain.     Heart size is within normal limits. Lungs appear free of acute  infiltrates. Mediport catheter and cervical fusion plate are seen.       Impression:      1. No acute process.        This report was finalized on 4/28/2025 10:14 AM by Dr. Jose Lassiter M.D on Workstation: [id number]                 Assessment:  Active Hospital Problems    Diagnosis  POA    **Intractable nausea and vomiting [R11.2]  Yes      Resolved Hospital Problems   No  resolved problems to display.   Abdominal pain  Seizures   Tobacco use    Plan:  Fluids  Ask gi to see her  Ct abdomen and ruq ultrasound noted  Trend labs  Ppi  Dw patient and ed provider    Sadia Whitt MD  4/28/2025  20:46 EDT

## 2025-04-29 NOTE — PROGRESS NOTES
Name: Noelle Mehta ADMIT: 2025   : 1965  PCP: Provider, No Known    MRN: [id number] LOS: 0 days   AGE/SEX: 60 y.o. female  ROOM: [facility]     Subjective   Subjective   Chief Complaint   Patient presents with    Abdominal Pain     She is reporting ongoing nausea.  She does have right upper quadrant pain which is intermittent.  She reports that that pain is worse after she eats or drinks.  She does not feel like she is getting food stuck in her throat.  Is not had any bloody emesis.  She is not reporting any fevers or chills.  No chest pain palpitations or shortness of breath.  Not reporting any dysuria or flank pain.     Objective   Objective   Vital Signs  Temp:  [96.9 °F (36.1 °C)-98.9 °F (37.2 °C)] 96.9 °F (36.1 °C)  Heart Rate:  [] 85  Resp:  [18] 18  BP: (118-154)/(74-99) 118/77  SpO2:  [92 %-96 %] 96 %  on  Flow (L/min) (Oxygen Therapy):  [2] 2;   Device (Oxygen Therapy): nasal cannula  Body mass index is 35.12 kg/m².    Physical Exam  Vitals and nursing note reviewed.   Constitutional:       Appearance: She is not diaphoretic.   HENT:      Head: Atraumatic.   Eyes:      General: No scleral icterus.     Pupils: Pupils are equal, round, and reactive to light.   Cardiovascular:      Rate and Rhythm: Normal rate and regular rhythm.      Pulses: Normal pulses.   Pulmonary:      Effort: Pulmonary effort is normal.      Breath sounds: No wheezing.   Abdominal:      General: There is no distension.      Palpations: Abdomen is soft.      Tenderness: There is abdominal tenderness. There is no guarding or rebound.   Musculoskeletal:         General: No swelling.   Skin:     General: Skin is warm and dry.   Neurological:      Mental Status: She is alert.      Cranial Nerves: No cranial nerve deficit.   Psychiatric:         Mood and Affect: Mood normal.         Behavior: Behavior normal.       Results Review  I reviewed the patient's new clinical results.    Results from last 7 days   Lab Units  "04/29/25  0650 04/28/25  1032   WBC 10*3/mm3 6.63 8.27   HEMOGLOBIN g/dL 12.9 14.3   PLATELETS 10*3/mm3 248 240     Results from last 7 days   Lab Units 04/29/25  0650 04/28/25  1032   SODIUM mmol/L 139 136   POTASSIUM mmol/L 4.2 3.8   CHLORIDE mmol/L 106 102   CO2 mmol/L 23.9 23.0   BUN mg/dL 10 17   CREATININE mg/dL 0.86 0.90   GLUCOSE mg/dL 97 98   EGFR mL/min/1.73 77.9 73.8     Results from last 7 days   Lab Units 04/28/25  1032   ALBUMIN g/dL 4.2   BILIRUBIN mg/dL 0.5   ALK PHOS U/L 82   AST (SGOT) U/L 23   ALT (SGPT) U/L 16     Results from last 7 days   Lab Units 04/29/25  0650 04/28/25  1032   CALCIUM mg/dL 8.6 9.6   ALBUMIN g/dL  --  4.2         No results found for: \"HGBA1C\", \"POCGLU\"    US Gallbladder  Result Date: 4/28/2025  1.  Negative right upper quadrant sonogram  This report was finalized on 4/28/2025 4:17 PM by Dr. Jaquan Ignacio M.D on Workstation: [id number]      CT Abdomen Pelvis With Contrast  Result Date: 4/28/2025  1. No evidence for acute intra-abdominal process. Bowel loops are mildly air distended though there is no bowel dilatation to suggest obstruction or evidence for appendicitis. 2. Mild atherosclerotic disease.  Radiation dose reduction techniques were utilized, including automated exposure control and exposure modulation based on body size.   This report was finalized on 4/28/2025 3:31 PM by Arvin Haynes M.D on Workstation: [id number]      XR Chest 1 View  Result Date: 4/28/2025  1. No acute process.   This report was finalized on 4/28/2025 10:14 AM by Dr. Jose Lassiter M.D on Workstation: [id number]        I have personally reviewed all medications:  Scheduled Medications  aspirin, 325 mg, Oral, Once  FLUoxetine, 60 mg, Oral, Daily  levETIRAcetam, 1,000 mg, Oral, BID  pantoprazole, 40 mg, Intravenous, BID AC  sodium chloride, 10 mL, Intravenous, Q12H  topiramate, 200 mg, Oral, Q12H      Infusions  sodium chloride, 100 mL/hr  sodium chloride 0.9 % with KCl 20 mEq, 100 " mL/hr, Last Rate: 100 mL/hr (04/29/25 0751)      Diet  Diet: Liquid; Clear Liquid; Fluid Consistency: Thin (IDDSI 0)       Assessment/Plan     Active Hospital Problems    Diagnosis  POA    **Intractable nausea and vomiting [R11.2]  Yes      Resolved Hospital Problems   No resolved problems to display.       60 y.o. female admitted with Intractable nausea and vomiting.    Nausea/vomiting/right upper quadrant pain: LFTs are okay.  Added lipase which appears okay.  Did not have any acute gallbladder findings on ultrasound.  With the postprandial pain I will order a HIDA . she has a history of drug overdose.  Screen UDS.  Ethanol was negative.  Continue supportive care.  GI consulting.  Epilepsy: Continue AEDs  Depression: Continue Prozac  PPX: SCD  Disposition: TBD    Expected discharge date/ time has not been documented.     Bandar Hancock MD  [facility]  04/29/25  10:43 EDT    Dictated portions of note using Dragon dictation software.  Copied text in this note has been reviewed by me and remains accurate as of 04/29/25

## 2025-04-29 NOTE — PLAN OF CARE
Goal Outcome Evaluation: Patient a&o. Uncooperative and impulsive at times. At 1330 pt appeared to have had an episode of syncope w/ primary nurse Kassie and NA at beside. Pt was sternal rubbed and after about a minute she was responsive. Rapid response was called for patient safety. Bed alarm utilized, falls precautions maintained. Bed rails padded for seizure precautions. VSS. Pt frequently seeking out staff. IV dilaudid given for pain q4. IVF infusing. EEG performed today. Neurology consulted. Plan for HIDA scan tomorrow morning. Plan of care will continue.

## 2025-04-30 ENCOUNTER — APPOINTMENT (OUTPATIENT)
Dept: NUCLEAR MEDICINE | Facility: HOSPITAL | Age: 60
End: 2025-04-30
Payer: COMMERCIAL

## 2025-04-30 ENCOUNTER — APPOINTMENT (OUTPATIENT)
Dept: MRI IMAGING | Facility: HOSPITAL | Age: 60
End: 2025-04-30
Payer: COMMERCIAL

## 2025-04-30 ENCOUNTER — ANESTHESIA EVENT (OUTPATIENT)
Dept: GASTROENTEROLOGY | Facility: HOSPITAL | Age: 60
End: 2025-04-30
Payer: COMMERCIAL

## 2025-04-30 ENCOUNTER — ANESTHESIA (OUTPATIENT)
Dept: GASTROENTEROLOGY | Facility: HOSPITAL | Age: 60
End: 2025-04-30
Payer: COMMERCIAL

## 2025-04-30 PROBLEM — K29.90 GASTRITIS AND DUODENITIS: Status: ACTIVE | Noted: 2025-04-30

## 2025-04-30 PROBLEM — F32.A DEPRESSION: Status: ACTIVE | Noted: 2025-04-30

## 2025-04-30 PROBLEM — G40.909 SEIZURE DISORDER: Status: ACTIVE | Noted: 2025-04-30

## 2025-04-30 PROBLEM — R55 SYNCOPE: Status: ACTIVE | Noted: 2025-04-30

## 2025-04-30 PROBLEM — K26.9 DUODENAL ULCER: Status: ACTIVE | Noted: 2025-04-30

## 2025-04-30 LAB
ALBUMIN SERPL-MCNC: 3.5 G/DL (ref 3.5–5.2)
ANION GAP SERPL CALCULATED.3IONS-SCNC: 8.3 MMOL/L (ref 5–15)
BASOPHILS # BLD AUTO: 0.04 10*3/MM3 (ref 0–0.2)
BASOPHILS NFR BLD AUTO: 1 % (ref 0–1.5)
BUN SERPL-MCNC: 8 MG/DL (ref 6–20)
BUN/CREAT SERPL: 9.1 (ref 7–25)
CALCIUM SPEC-SCNC: 8.5 MG/DL (ref 8.6–10.5)
CHLORIDE SERPL-SCNC: 110 MMOL/L (ref 98–107)
CO2 SERPL-SCNC: 20.7 MMOL/L (ref 22–29)
CREAT SERPL-MCNC: 0.88 MG/DL (ref 0.57–1)
DEPRECATED RDW RBC AUTO: 54.8 FL (ref 37–54)
EGFRCR SERPLBLD CKD-EPI 2021: 75.8 ML/MIN/1.73
EOSINOPHIL # BLD AUTO: 0.14 10*3/MM3 (ref 0–0.4)
EOSINOPHIL NFR BLD AUTO: 3.4 % (ref 0.3–6.2)
ERYTHROCYTE [DISTWIDTH] IN BLOOD BY AUTOMATED COUNT: 14.4 % (ref 12.3–15.4)
FOLATE SERPL-MCNC: >20 NG/ML (ref 4.78–24.2)
GLUCOSE SERPL-MCNC: 92 MG/DL (ref 65–99)
HBA1C MFR BLD: 5 % (ref 4.8–5.6)
HCT VFR BLD AUTO: 37.2 % (ref 34–46.6)
HGB BLD-MCNC: 12 G/DL (ref 12–15.9)
IMM GRANULOCYTES # BLD AUTO: 0.01 10*3/MM3 (ref 0–0.05)
IMM GRANULOCYTES NFR BLD AUTO: 0.2 % (ref 0–0.5)
LYMPHOCYTES # BLD AUTO: 1.8 10*3/MM3 (ref 0.7–3.1)
LYMPHOCYTES NFR BLD AUTO: 43.8 % (ref 19.6–45.3)
MAGNESIUM SERPL-MCNC: 2.2 MG/DL (ref 1.6–2.6)
MCH RBC QN AUTO: 33.5 PG (ref 26.6–33)
MCHC RBC AUTO-ENTMCNC: 32.3 G/DL (ref 31.5–35.7)
MCV RBC AUTO: 103.9 FL (ref 79–97)
MONOCYTES # BLD AUTO: 0.46 10*3/MM3 (ref 0.1–0.9)
MONOCYTES NFR BLD AUTO: 11.2 % (ref 5–12)
NEUTROPHILS NFR BLD AUTO: 1.66 10*3/MM3 (ref 1.7–7)
NEUTROPHILS NFR BLD AUTO: 40.4 % (ref 42.7–76)
NRBC BLD AUTO-RTO: 0 /100 WBC (ref 0–0.2)
PHOSPHATE SERPL-MCNC: 2.9 MG/DL (ref 2.5–4.5)
PLATELET # BLD AUTO: 191 10*3/MM3 (ref 140–450)
PMV BLD AUTO: 10.1 FL (ref 6–12)
POTASSIUM SERPL-SCNC: 3.7 MMOL/L (ref 3.5–5.2)
RBC # BLD AUTO: 3.58 10*6/MM3 (ref 3.77–5.28)
SODIUM SERPL-SCNC: 139 MMOL/L (ref 136–145)
TSH SERPL DL<=0.05 MIU/L-ACNC: 1.73 UIU/ML (ref 0.27–4.2)
VIT B12 BLD-MCNC: 624 PG/ML (ref 211–946)
WBC NRBC COR # BLD AUTO: 4.11 10*3/MM3 (ref 3.4–10.8)

## 2025-04-30 PROCEDURE — 83735 ASSAY OF MAGNESIUM: CPT | Performed by: INTERNAL MEDICINE

## 2025-04-30 PROCEDURE — 25010000002 PROPOFOL 10 MG/ML EMULSION: Performed by: ANESTHESIOLOGY

## 2025-04-30 PROCEDURE — 82746 ASSAY OF FOLIC ACID SERUM: CPT | Performed by: INTERNAL MEDICINE

## 2025-04-30 PROCEDURE — 25510000002 GADOBENATE DIMEGLUMINE 529 MG/ML SOLUTION: Performed by: HOSPITALIST

## 2025-04-30 PROCEDURE — 82607 VITAMIN B-12: CPT | Performed by: INTERNAL MEDICINE

## 2025-04-30 PROCEDURE — 25010000002 SINCALIDE PER 5 MCG: Performed by: INTERNAL MEDICINE

## 2025-04-30 PROCEDURE — 78227 HEPATOBIL SYST IMAGE W/DRUG: CPT

## 2025-04-30 PROCEDURE — 25010000002 ONDANSETRON PER 1 MG: Performed by: INTERNAL MEDICINE

## 2025-04-30 PROCEDURE — 80069 RENAL FUNCTION PANEL: CPT | Performed by: INTERNAL MEDICINE

## 2025-04-30 PROCEDURE — 25010000002 HYDROMORPHONE PER 4 MG: Performed by: INTERNAL MEDICINE

## 2025-04-30 PROCEDURE — A9577 INJ MULTIHANCE: HCPCS | Performed by: HOSPITALIST

## 2025-04-30 PROCEDURE — 34310000005 TECHNETIUM TC 99M MEBROFENIN KIT: Performed by: HOSPITALIST

## 2025-04-30 PROCEDURE — G0378 HOSPITAL OBSERVATION PER HR: HCPCS

## 2025-04-30 PROCEDURE — 70553 MRI BRAIN STEM W/O & W/DYE: CPT

## 2025-04-30 PROCEDURE — 25810000003 LACTATED RINGERS PER 1000 ML: Performed by: INTERNAL MEDICINE

## 2025-04-30 PROCEDURE — 25010000002 LIDOCAINE 2% SOLUTION: Performed by: ANESTHESIOLOGY

## 2025-04-30 PROCEDURE — 83036 HEMOGLOBIN GLYCOSYLATED A1C: CPT | Performed by: INTERNAL MEDICINE

## 2025-04-30 PROCEDURE — 88305 TISSUE EXAM BY PATHOLOGIST: CPT | Performed by: INTERNAL MEDICINE

## 2025-04-30 PROCEDURE — 85025 COMPLETE CBC W/AUTO DIFF WBC: CPT | Performed by: INTERNAL MEDICINE

## 2025-04-30 PROCEDURE — 25010000002 PROCHLORPERAZINE 10 MG/2ML SOLUTION: Performed by: INTERNAL MEDICINE

## 2025-04-30 PROCEDURE — 25010000002 HYDROMORPHONE PER 4 MG: Performed by: HOSPITALIST

## 2025-04-30 PROCEDURE — 43239 EGD BIOPSY SINGLE/MULTIPLE: CPT | Performed by: INTERNAL MEDICINE

## 2025-04-30 PROCEDURE — A9537 TC99M MEBROFENIN: HCPCS | Performed by: HOSPITALIST

## 2025-04-30 PROCEDURE — 84443 ASSAY THYROID STIM HORMONE: CPT | Performed by: INTERNAL MEDICINE

## 2025-04-30 PROCEDURE — 25010000002 PROPOFOL 1000 MG/100ML EMULSION: Performed by: ANESTHESIOLOGY

## 2025-04-30 PROCEDURE — 99214 OFFICE O/P EST MOD 30 MIN: CPT | Performed by: PHYSICIAN ASSISTANT

## 2025-04-30 PROCEDURE — 25810000003 SODIUM CHLORIDE 0.9 % SOLUTION: Performed by: INTERNAL MEDICINE

## 2025-04-30 RX ORDER — SODIUM CHLORIDE, SODIUM LACTATE, POTASSIUM CHLORIDE, CALCIUM CHLORIDE 600; 310; 30; 20 MG/100ML; MG/100ML; MG/100ML; MG/100ML
30 INJECTION, SOLUTION INTRAVENOUS CONTINUOUS PRN
Status: ACTIVE | OUTPATIENT
Start: 2025-04-30 | End: 2025-04-30

## 2025-04-30 RX ORDER — OXYCODONE AND ACETAMINOPHEN 5; 325 MG/1; MG/1
1 TABLET ORAL EVERY 6 HOURS PRN
Refills: 0 | Status: DISCONTINUED | OUTPATIENT
Start: 2025-04-30 | End: 2025-05-01 | Stop reason: HOSPADM

## 2025-04-30 RX ORDER — PANTOPRAZOLE SODIUM 40 MG/1
40 TABLET, DELAYED RELEASE ORAL
Status: DISCONTINUED | OUTPATIENT
Start: 2025-04-30 | End: 2025-05-01 | Stop reason: HOSPADM

## 2025-04-30 RX ORDER — SUCRALFATE 1 G/1
1 TABLET ORAL
Status: DISCONTINUED | OUTPATIENT
Start: 2025-04-30 | End: 2025-05-01 | Stop reason: HOSPADM

## 2025-04-30 RX ORDER — PROPOFOL 10 MG/ML
INJECTION, EMULSION INTRAVENOUS AS NEEDED
Status: DISCONTINUED | OUTPATIENT
Start: 2025-04-30 | End: 2025-04-30 | Stop reason: SURG

## 2025-04-30 RX ORDER — LIDOCAINE HYDROCHLORIDE 20 MG/ML
INJECTION, SOLUTION INFILTRATION; PERINEURAL AS NEEDED
Status: DISCONTINUED | OUTPATIENT
Start: 2025-04-30 | End: 2025-04-30 | Stop reason: SURG

## 2025-04-30 RX ORDER — HYDROMORPHONE HYDROCHLORIDE 1 MG/ML
0.25 INJECTION, SOLUTION INTRAMUSCULAR; INTRAVENOUS; SUBCUTANEOUS EVERY 8 HOURS PRN
Status: DISCONTINUED | OUTPATIENT
Start: 2025-04-30 | End: 2025-05-01 | Stop reason: HOSPADM

## 2025-04-30 RX ORDER — KIT FOR THE PREPARATION OF TECHNETIUM TC 99M MEBROFENIN 45 MG/10ML
1 INJECTION, POWDER, LYOPHILIZED, FOR SOLUTION INTRAVENOUS
Status: COMPLETED | OUTPATIENT
Start: 2025-04-30 | End: 2025-04-30

## 2025-04-30 RX ADMIN — BISACODYL 5 MG: 5 TABLET, COATED ORAL at 18:20

## 2025-04-30 RX ADMIN — Medication 10 ML: at 20:17

## 2025-04-30 RX ADMIN — MEBROFENIN 1 DOSE: 45 INJECTION, POWDER, LYOPHILIZED, FOR SOLUTION INTRAVENOUS at 06:41

## 2025-04-30 RX ADMIN — HYDROMORPHONE HYDROCHLORIDE 0.25 MG: 1 INJECTION, SOLUTION INTRAMUSCULAR; INTRAVENOUS; SUBCUTANEOUS at 08:59

## 2025-04-30 RX ADMIN — GADOBENATE DIMEGLUMINE 20 ML: 529 INJECTION, SOLUTION INTRAVENOUS at 03:13

## 2025-04-30 RX ADMIN — ACETAMINOPHEN 650 MG: 650 SOLUTION ORAL at 12:26

## 2025-04-30 RX ADMIN — HYDROMORPHONE HYDROCHLORIDE 0.25 MG: 1 INJECTION, SOLUTION INTRAMUSCULAR; INTRAVENOUS; SUBCUTANEOUS at 17:06

## 2025-04-30 RX ADMIN — PROPOFOL INJECTABLE EMULSION 150 MG: 10 INJECTION, EMULSION INTRAVENOUS at 10:57

## 2025-04-30 RX ADMIN — TOPIRAMATE 200 MG: 100 TABLET, FILM COATED ORAL at 20:17

## 2025-04-30 RX ADMIN — OXYCODONE AND ACETAMINOPHEN 1 TABLET: 5; 325 TABLET ORAL at 13:56

## 2025-04-30 RX ADMIN — SUCRALFATE 1 G: 1 TABLET ORAL at 12:25

## 2025-04-30 RX ADMIN — PROCHLORPERAZINE EDISYLATE 5 MG: 5 INJECTION INTRAMUSCULAR; INTRAVENOUS at 17:06

## 2025-04-30 RX ADMIN — OXYCODONE AND ACETAMINOPHEN 1 TABLET: 5; 325 TABLET ORAL at 20:17

## 2025-04-30 RX ADMIN — FLUOXETINE 60 MG: 20 CAPSULE ORAL at 08:59

## 2025-04-30 RX ADMIN — LEVETIRACETAM 1000 MG: 500 TABLET, FILM COATED ORAL at 20:17

## 2025-04-30 RX ADMIN — SUCRALFATE 1 G: 1 TABLET ORAL at 20:17

## 2025-04-30 RX ADMIN — PROPOFOL 200 MCG/KG/MIN: 10 INJECTION, EMULSION INTRAVENOUS at 10:57

## 2025-04-30 RX ADMIN — LEVETIRACETAM 1000 MG: 500 TABLET, FILM COATED ORAL at 08:59

## 2025-04-30 RX ADMIN — TOPIRAMATE 200 MG: 100 TABLET, FILM COATED ORAL at 08:59

## 2025-04-30 RX ADMIN — SODIUM CHLORIDE, POTASSIUM CHLORIDE, SODIUM LACTATE AND CALCIUM CHLORIDE 30 ML/HR: 600; 310; 30; 20 INJECTION, SOLUTION INTRAVENOUS at 10:17

## 2025-04-30 RX ADMIN — SINCALIDE 2 MCG: 5 INJECTION, POWDER, LYOPHILIZED, FOR SOLUTION INTRAVENOUS at 08:06

## 2025-04-30 RX ADMIN — PANTOPRAZOLE SODIUM 40 MG: 40 TABLET, DELAYED RELEASE ORAL at 17:06

## 2025-04-30 RX ADMIN — SODIUM CHLORIDE 100 ML/HR: 9 INJECTION, SOLUTION INTRAVENOUS at 00:09

## 2025-04-30 RX ADMIN — SUCRALFATE 1 G: 1 TABLET ORAL at 17:06

## 2025-04-30 RX ADMIN — Medication 10 ML: at 08:59

## 2025-04-30 RX ADMIN — ONDANSETRON 4 MG: 2 INJECTION, SOLUTION INTRAMUSCULAR; INTRAVENOUS at 08:59

## 2025-04-30 RX ADMIN — LIDOCAINE HYDROCHLORIDE 70 MG: 20 INJECTION, SOLUTION INFILTRATION; PERINEURAL at 10:57

## 2025-04-30 NOTE — PROGRESS NOTES
Name: Noelle Mehta ADMIT: 2025   : 1965  PCP: Provider, No Known    MRN: [id number] LOS: 0 days   AGE/SEX: 60 y.o. female  ROOM: [facility]     Subjective   Subjective   Pt c/o abd pain, asking for Dilaudid. Not interested in eating. Just back from endoscopy. Voiding well. No N/V/D. No F/C/NS. No SOA or CP or palp.        Objective   Objective   Vital Signs  Temp:  [97 °F (36.1 °C)-97.6 °F (36.4 °C)] 97 °F (36.1 °C)  Heart Rate:  [] 69  Resp:  [13-18] 16  BP: ()/() 132/65  SpO2:  [93 %-99 %] 97 %  on   ;   Device (Oxygen Therapy): room air  Body mass index is 35.12 kg/m².  Physical Exam  Vitals and nursing note reviewed. Exam conducted with a chaperone present (RN).   Constitutional:       General: She is not in acute distress.     Appearance: She is ill-appearing (chronically). She is not toxic-appearing or diaphoretic.   HENT:      Head: Normocephalic.      Nose: Nose normal.      Mouth/Throat:      Mouth: Mucous membranes are moist.      Pharynx: Oropharynx is clear.   Eyes:      General: No scleral icterus.        Right eye: No discharge.         Left eye: No discharge.      Extraocular Movements: Extraocular movements intact.      Conjunctiva/sclera: Conjunctivae normal.   Cardiovascular:      Rate and Rhythm: Normal rate and regular rhythm.      Pulses: Normal pulses.      Comments: Port left upper chest  Pulmonary:      Effort: Pulmonary effort is normal. No respiratory distress.      Breath sounds: Normal breath sounds. No wheezing or rales.   Abdominal:      General: Bowel sounds are normal. There is no distension.      Palpations: Abdomen is soft.      Tenderness: There is abdominal tenderness (diffuse). There is no guarding or rebound.   Musculoskeletal:         General: No swelling.      Cervical back: Neck supple.   Skin:     General: Skin is warm and dry.      Capillary Refill: Capillary refill takes less than 2 seconds.      Coloration: Skin is not jaundiced.    Neurological:      General: No focal deficit present.      Mental Status: She is alert. Mental status is at baseline.   Psychiatric:         Mood and Affect: Mood normal.       Results Review     I reviewed the patient's new clinical results.  Results from last 7 days   Lab Units 04/30/25  0506 04/29/25  0650 04/28/25  1032   WBC 10*3/mm3 4.11 6.63 8.27   HEMOGLOBIN g/dL 12.0 12.9 14.3   PLATELETS 10*3/mm3 191 248 240     Results from last 7 days   Lab Units 04/30/25  0506 04/29/25  0650 04/28/25  1032   SODIUM mmol/L 139 139 136   POTASSIUM mmol/L 3.7 4.2 3.8   CHLORIDE mmol/L 110* 106 102   CO2 mmol/L 20.7* 23.9 23.0   BUN mg/dL 8 10 17   CREATININE mg/dL 0.88 0.86 0.90   GLUCOSE mg/dL 92 97 98   EGFR mL/min/1.73 75.8 77.9 73.8     Results from last 7 days   Lab Units 04/30/25  0506 04/28/25  1032   ALBUMIN g/dL 3.5 4.2   BILIRUBIN mg/dL  --  0.5   ALK PHOS U/L  --  82   AST (SGOT) U/L  --  23   ALT (SGPT) U/L  --  16     Results from last 7 days   Lab Units 04/30/25  0506 04/29/25  0650 04/28/25  1032   CALCIUM mg/dL 8.5* 8.6 9.6   ALBUMIN g/dL 3.5  --  4.2   MAGNESIUM mg/dL 2.2  --   --    PHOSPHORUS mg/dL 2.9  --   --        Hemoglobin A1C   Date/Time Value Ref Range Status   04/30/2025 0506 5.00 4.80 - 5.60 % Final     Glucose   Date/Time Value Ref Range Status   04/29/2025 1338 90 70 - 130 mg/dL Final       NM HIDA SCAN WITH PHARMACOLOGICAL INTERVENTION  Result Date: 4/30/2025  Normal gallbladder ejection fraction.   This report was finalized on 4/30/2025 9:04 AM by Dr. Toney Ventura M.D on Workstation: [id number]      MRI Brain With & Without Contrast  Result Date: 4/30/2025  1. No acute intracranial abnormality. No abnormal enhancement.    This report was finalized on 4/30/2025 4:30 AM by Dr. Idania Babin M.D on Workstation: [id number]      US Gallbladder  Result Date: 4/28/2025  1.  Negative right upper quadrant sonogram  This report was finalized on 4/28/2025 4:17 PM by Dr. Jaquan Ignacio,  M.D on Workstation: [id number]        I have personally reviewed all medications:  Scheduled Medications  FLUoxetine, 60 mg, Oral, Daily  levETIRAcetam, 1,000 mg, Oral, BID  pantoprazole, 40 mg, Oral, BID AC  sodium chloride, 10 mL, Intravenous, Q12H  sucralfate, 1 g, Oral, 4x Daily AC & at Bedtime  topiramate, 200 mg, Oral, Q12H    Infusions  sodium chloride, 100 mL/hr, Last Rate: 100 mL/hr (04/30/25 0450)    Diet  Diet: Liquid; Clear Liquid; Fluid Consistency: Thin (IDDSI 0)    I have personally reviewed:  [x]  Laboratory   [x]  Microbiology   [x]  Radiology   [x]  EKG/Telemetry  []  Cardiology/Vascular   []  Pathology    [x]  Records       Assessment/Plan     Active Hospital Problems    Diagnosis  POA    **Intractable nausea and vomiting [R11.2]  Yes    Depression [F32.A]  Yes    Seizure disorder [G40.909]  Yes    Syncope [R55]  No    Pain of upper abdomen [R10.10]  Yes      Resolved Hospital Problems   No resolved problems to display.       60 y.o. female admitted with intractable nausea/vomiting and RUQ pain.     Nausea/vomiting/RUQ pain  Gastritis/Duodenitis  Duodenal ulcers: LFTs wnl. Lipase wnl.  CT A/P, GB US, and HIDA scan are fine. she has a history of drug overdose.  UDS positive for opiates but had received Dilaudid in ER.  BAL was negative.  GI consulted.  Pt uses NSAIDs frequently.  EGD this AM showed gastritis, duodenitis, and non-bleeding duodenal ulcers.  GI recommends BID PPI, Carafate, and repeat EGD in 3 months.  GI will f/u on H.pylori testing.  Advance diet as tolerated.  Wean Dilaudid, trial oral analgesia.    Syncope:  Suspect due to Dilaudid. Neg orthostatics here. EKG fine. Trop neg. Neuro has seen given h/o seizure. MRI Brain unremarkable. EEG pending. A1c, TSH, B12, and folate all wnl.    Epilepsy: Continue AEDs, no seizure activity here.    Depression: Continue Prozac.    H/o Lung CA: Diagnosed in 2023, s/p lobectomy and chemotx completed 2024.      SCDs for DVT prophylaxis.  Full  code.  Discussed with patient and RN.  Anticipate discharge home with family tomorrow.  Expected Discharge Date: 5/1/2025; Expected Discharge Time:       Aly Dinh MD  [facility]  04/30/25  13:20 EDT

## 2025-04-30 NOTE — PROGRESS NOTES
"DOS: 2025  NAME: Noelle Mehta   : 1965  PCP: Provider, No Known  Chief Complaint   Patient presents with    Abdominal Pain       Chief complaint: Syncope  Subjective:  Pt c/o abdominal pain.  Tells that her passing out episode was not like her previous seizures.  Her seizures are GTC and occurs every couple months.  They started when she was in her teen after a head injury she tells.  She has an appt with a neurologist upcoming she tells.  She also says she is cranky and has pain    Objective:  Vital signs: /82 (BP Location: Right arm, Patient Position: Lying)   Pulse 70   Temp 97 °F (36.1 °C) (Oral)   Resp 18   Ht 170.2 cm (67\")   Wt 102 kg (224 lb 3.3 oz)   SpO2 98%   BMI 35.12 kg/m²      Gen: NAD, vitals reviewed  MS: oriented x3, recent/remote memory intact, normal attention/concentration, language intact, no neglect.  CN: visual acuity grossly normal, PERRL, EOMI, no facial droop, no dysarthria  Motor: MAEW without assyemtry, no abnormal mvmts  Sensory: intact to light touch all 4 ext.  Coordination: intact    ROS:  No weakness, numbness  No fevers, chills    Scheduled Medications:aspirin, 325 mg, Oral, Once  FLUoxetine, 60 mg, Oral, Daily  levETIRAcetam, 1,000 mg, Oral, BID  pantoprazole, 40 mg, Intravenous, BID AC  sodium chloride, 10 mL, Intravenous, Q12H  topiramate, 200 mg, Oral, Q12H      Infusions: sodium chloride, 100 mL/hr, Last Rate: 100 mL/hr (25 0450)      PRN Medications:    acetaminophen **OR** acetaminophen **OR** acetaminophen    albuterol    senna-docusate sodium **AND** polyethylene glycol **AND** bisacodyl **AND** bisacodyl    heparin    HYDROmorphone    ondansetron ODT **OR** ondansetron    prochlorperazine    sodium chloride    sodium chloride    sodium chloride    sodium chloride    Laboratory results:  Lab Results   Component Value Date    GLUCOSE 92 2025    CALCIUM 8.5 (L) 2025     2025    K 3.7 2025    CO2 20.7 (L) 2025    " " (H) 04/30/2025    BUN 8 04/30/2025    CREATININE 0.88 04/30/2025    EGFRIFAFRI >60 03/17/2023    EGFRIFNONA 85 04/17/2021    BCR 9.1 04/30/2025    ANIONGAP 8.3 04/30/2025     Lab Results   Component Value Date    WBC 4.11 04/30/2025    HGB 12.0 04/30/2025    HCT 37.2 04/30/2025    .9 (H) 04/30/2025     04/30/2025     No results found for: \"LDL\"      Lab 04/30/25  0506   HEMOGLOBIN A1C 5.00        Review of labs: UDS positive for THC and opioids, positive for amphetamines in the past    Review and interpretation of imaging:     NM HIDA SCAN WITH PHARMACOLOGICAL INTERVENTION  Result Date: 4/30/2025  NM HIDA SCAN WITH PHARMACOLOGICAL INTERVENTION-  TECHNIQUE: 5.3 mCi of Technetium-99m Mebrofenin was administered intravenously. Static anterior planar imaging of the abdomen was obtained at 30 and 60 minutes. IV CCK was subsequently administered with additional dynamic scintigraphic imaging thereafter.  INDICATION: Postprandial right upper quadrant pain and nausea  COMPARISON: Gallbladder ultrasound 4/28/2025   FINDINGS: Liver, biliary tree and small bowel seen at 30 minutes. Gallbladder seen at 60 minutes. Gallbladder ejection fraction 82%        Normal gallbladder ejection fraction.   This report was finalized on 4/30/2025 9:04 AM by Dr. Toney Ventura M.D on Workstation: [id number]      MRI Brain With & Without Contrast  Result Date: 4/30/2025  BRAIN MRI WITH AND WITHOUT CONTRAST  HISTORY: History of lung cancer, rule out brain metastasis; R11.2-Nausea with vomiting, unspecified; R10.10-Upper abdominal pain, unspecified; T50.902D-Poisoning by unspecified drugs, medicaments and biological substances, intentional self-harm, subsequent encounter  COMPARISON: None.  FINDINGS:  Multiplanar images of the head were obtained without and with gadolinium. No areas of restricted diffusion are seen to suggest acute infarct. The ventricles are normal in size. There is no midline shift or mass effect. There " is some periventricular and deep white matter microangiopathic change. The intracranial flow voids appear intact. No abnormality is seen on susceptibility weighted imaging. Postcontrast imaging does not demonstrate any abnormal enhancement. There is no evidence of venous occlusion. Mucosal thickening is noted within the ethmoid and maxillary sinuses. There is trace fluid within the mastoid air cells, right greater than left.      1. No acute intracranial abnormality. No abnormal enhancement.    This report was finalized on 4/30/2025 4:30 AM by Dr. Idania Babin M.D on Workstation: [id number]      US Gallbladder  Result Date: 4/28/2025  Right upper quadrant abdominal sonogram  TECHNIQUE: Grayscale and color Doppler images of the right upper quadrant of the abdomen were obtained.  HISTORY: Right upper quadrant pain  COMPARISON: CT abdomen pelvis 4/20/2025  FINDINGS:  The gallbladder is not distended. There is no evidence of cholelithiasis.  The common bile duct measures 4 mm in diameter. There is no intrahepatic biliary ductal dilation.  The liver has a normal sonographic appearance.  The right kidney measures 11 cm in length.  The right kidney demonstrates normal echogenicity and cortical thickness. There is no right-sided hydronephrosis. There are no right-sided cysts, masses or renal calculi.  The visualized portion of the pancreas is unremarkable.  Visualized portions of the aorta and IVC appear normal.      1.  Negative right upper quadrant sonogram  This report was finalized on 4/28/2025 4:17 PM by Dr. Jaquan Ignacio M.D on Workstation: [id number]      CT Abdomen Pelvis With Contrast  Result Date: 4/28/2025  CT ABDOMEN PELVIS W CONTRAST-  HISTORY: 60 years of age, Female.  abdominal pain  TECHNIQUE:  CT includes axial imaging from the lung bases to the trochanters with intravenous contrast and without use of oral contrast. Data reconstructed in coronal and sagittal planes. Radiation dose reduction  techniques were utilized, including automated exposure control and exposure modulation based on body size.  COMPARISON: None  FINDINGS: There is linear subsegmental lower lobe atelectasis.  Gallbladder is mildly distended though there is no pericystic inflammation. The gallbladder is mildly distended though there is no evidence for wall thickening or pericholecystic inflammation. Further evaluation could be performed with right upper quadrant sonogram if indicated. Spleen, adrenal glands, pancreas, kidneys exhibit normal CT appearance.  No finding to suggest appendicitis. No bowel dilatation or evidence for bowel obstruction. There are mildly air distended large and small bowel loops. No transition point. No ascites.  Small periumbilical hernia contains fat. Mild atherosclerotic calcifications are present involving the abdominal aorta and iliac vasculature.  At T8-9 there is a central to right posterior disc protrusion with mild to moderate narrowing of the central canal. There is also prominent posterior endplate spurring calcification associated the posterior longitudinal ligament at the T12-L1 and L1-2 levels. There has been right posterior decompression at the L1-2 level.      1. No evidence for acute intra-abdominal process. Bowel loops are mildly air distended though there is no bowel dilatation to suggest obstruction or evidence for appendicitis. 2. Mild atherosclerotic disease.  Radiation dose reduction techniques were utilized, including automated exposure control and exposure modulation based on body size.   This report was finalized on 4/28/2025 3:31 PM by Arvin Haynes M.D on Workstation: [id number]      XR Chest 1 View  Result Date: 4/28/2025  XR CHEST 1 VW-4/28/2025  HISTORY: Chest pain.  Heart size is within normal limits. Lungs appear free of acute infiltrates. Mediport catheter and cervical fusion plate are seen.      1. No acute process.   This report was finalized on 4/28/2025 10:14 AM by  Dr. Jose Lassiter M.D on Workstation: [id number]          Workup to date:    Diagnoses:  Syncope  Abdominal pain  H/o seizures    Impression: 60-year-old female with known history of seizure disorder on Keppra and Topamax, lung cancer status post lobectomy and chemotherapy last being in 2024, and depression.  She has been seen by neurology at [facility] in the past for strokelike symptoms, reporting prior history of stroke, but exam positive for embellishment no further workup done.  Prolonged EEG and spot EEG reports in 2024 are available for review and show theta delta slowing.  Multiple MRI reports at [facility] had been unremarkable.    She comes to the hospital with abdominal pain and nausea vomiting.  She had an episode of syncope yesterday morning for which neurology was consulted.  Story consistent with vasovagal etiology but in light of her risk factors neurowork-up warranted.  Additionally GI was consulted and HIDA scan to be done    MRI brain with and without is without abnormality.  EEG report is pending.  Will follow peripherally on report.  She is at her baseline of function with ongoing w/u for abdominal pain.  No changes to her ASMs.  Seizure precautions and she will f/u with neurologist as scheduled        Thank you for this consultation.  Discussed above plan with neuro attending, Dr. Henry who agrees with above plan.  Neurology team is available for concerns or questions.

## 2025-04-30 NOTE — PROGRESS NOTES
EGD    INDICATION  1.  Vomiting  2.  Abdominal pain  3.  NSAID use      FINDINGS  1.  Normal esophagus  2.  Gastritis biopsied for H. Pylori  3.  Significant inflammation of the duodenum and duodenal bulb with ulcerations multiple likely NSAID induced    RECOMMENDATION  1.  Await path  2.  Treat for Helicobacter pylori if positive  3.  Avoid all NSAIDs  4.  PPI twice daily  5.  Add Carafate  6.  Advance diet  7.  Patient should have EGD in 3 months to assess for healing.  GI will sign off and be available for any further assistance

## 2025-04-30 NOTE — ANESTHESIA POSTPROCEDURE EVALUATION
"Patient: Noelle Mehta    Procedure Summary       Date: 04/30/25 Room / Location:  [facility]    Anesthesia Start: 1050 Anesthesia Stop: 1114    Procedure: ESOPHAGOGASTRODUODENOSCOPY WITH BIOPSIES (Esophagus) Diagnosis:       Intractable nausea and vomiting      Pain of upper abdomen      (Intractable nausea and vomiting [R11.2])      (Pain of upper abdomen [R10.10])    Surgeons: Toribio Kim MD Provider: Juan Harrison DO    Anesthesia Type: MAC ASA Status: 3            Anesthesia Type: MAC    Vitals  Vitals Value Taken Time   /68 04/30/25 11:31   Temp     Pulse 73 04/30/25 11:37   Resp 16 04/30/25 11:30   SpO2 97 % 04/30/25 11:38   Vitals shown include unfiled device data.        Post Anesthesia Care and Evaluation    Patient location during evaluation: bedside  Patient participation: complete - patient participated  Level of consciousness: awake and alert  Pain management: adequate    Airway patency: patent  Anesthetic complications: No anesthetic complications  PONV Status: controlled  Cardiovascular status: acceptable and hemodynamically stable  Respiratory status: acceptable, spontaneous ventilation and nonlabored ventilation  Hydration status: acceptable    Comments: /68 (BP Location: Left arm, Patient Position: Sitting)   Pulse 74   Temp 36.1 °C (97 °F) (Oral)   Resp 16   Ht 170.2 cm (67\")   Wt 102 kg (224 lb 3.3 oz)   SpO2 97%   BMI 35.12 kg/m²       "

## 2025-04-30 NOTE — PLAN OF CARE
"Goal Outcome Evaluation:  Plan of Care Reviewed With: patient        Progress: improving  Outcome Evaluation: VSS, pt continues to ask for pain medication frequently, uncooperative at times, HIDA scan complete, EGD completed today, falls precautions maintained, pt up to bedside commode due to being uncooperative walking to the bathroom, seizure precautions maintained, possible home tomorrow, pt states her pain medication is not working, pt refused orthostatic vitals, pt has not had a BM since Saturday- dulcolax tab given. Pt states \"I never have a BM in the hospital\"                               "

## 2025-04-30 NOTE — ANESTHESIA PREPROCEDURE EVALUATION
Anesthesia Evaluation     Patient summary reviewed   no history of anesthetic complications:   NPO Solid Status: > 8 hours  NPO Liquid Status: > 2 hours           Airway   Mallampati: II  TM distance: >3 FB  Neck ROM: full  No difficulty expected  Dental      Pulmonary     breath sounds clear to auscultation  (+) a smoker Current, lung cancer (h/o),  (-) shortness of breath, recent URI  Cardiovascular   Exercise tolerance: good (4-7 METS)    ECG reviewed  Rhythm: regular  Rate: normal    (-) past MI, dysrhythmias, angina      Neuro/Psych  (+) seizures (tx'd on Keppra well mg'd; MRI w/o intracranial dz)  (-) CVA    ROS Comment:     - abd pain and subsequent LoC (1min)  GI/Hepatic/Renal/Endo    (+) morbid obesity    Musculoskeletal     (-) neck stiffness  Abdominal    Substance History   (+) alcohol use (h/o, reports none since 2018)     OB/GYN          Other        (-) blood dyscrasia                    Anesthesia Plan    ASA 3     MAC     (MAC anesthesia discussed with patient and/or patient representative. Risks (including but not limited to intra-op awareness), benefits, and alternatives were discussed. Understanding was voiced with an agreement to proceed with a MAC technique and General as a backup option. )    Anesthetic plan, risks, benefits, and alternatives have been provided, discussed and informed consent has been obtained with: patient.        CODE STATUS:    Code Status (Patient has no pulse and is not breathing): CPR (Attempt to Resuscitate)  Medical Interventions (Patient has pulse or is breathing): Full Support

## 2025-04-30 NOTE — PLAN OF CARE
Problem: Adult Inpatient Plan of Care  Goal: Plan of Care Review  Outcome: Progressing  Flowsheets (Taken 4/30/2025 0924)  Progress: no change  Outcome Evaluation: kept on Clear liquid diet and NPO after MN for HIDA scan in am, medicated with Dilaudid and Compazine once for c/o RLQ pain, Pt aware she is not allowed anymore pain meds for HIDA scan in am, falls precaution maintained, standby assist to the bathroom with quad cane, unsteady at times especially after pain meds, MRI brain done, IVF infusing, no syncopal episode noted so far, VSS  Plan of Care Reviewed With: patient   Goal Outcome Evaluation:  Plan of Care Reviewed With: patient        Progress: no change  Outcome Evaluation: kept on Clear liquid diet and NPO after MN for HIDA scan in am, medicated with Dilaudid and Compazine once for c/o RLQ pain, Pt aware she is not allowed anymore pain meds for HIDA scan in am, falls precaution maintained, standby assist to the bathroom with quad cane, unsteady at times especially after pain meds, MRI brain done, IVF infusing, no syncopal episode noted so far, VSS

## 2025-05-01 VITALS
SYSTOLIC BLOOD PRESSURE: 142 MMHG | HEIGHT: 67 IN | HEART RATE: 68 BPM | OXYGEN SATURATION: 96 % | BODY MASS INDEX: 35.19 KG/M2 | DIASTOLIC BLOOD PRESSURE: 89 MMHG | TEMPERATURE: 97.2 F | WEIGHT: 224.21 LBS | RESPIRATION RATE: 16 BRPM

## 2025-05-01 LAB
ALBUMIN SERPL-MCNC: 3.5 G/DL (ref 3.5–5.2)
ALBUMIN/GLOB SERPL: 1.5 G/DL
ALP SERPL-CCNC: 64 U/L (ref 39–117)
ALT SERPL W P-5'-P-CCNC: 15 U/L (ref 1–33)
ANION GAP SERPL CALCULATED.3IONS-SCNC: 6.8 MMOL/L (ref 5–15)
AST SERPL-CCNC: 18 U/L (ref 1–32)
BASOPHILS # BLD AUTO: 0.03 10*3/MM3 (ref 0–0.2)
BASOPHILS NFR BLD AUTO: 0.7 % (ref 0–1.5)
BILIRUB SERPL-MCNC: 0.3 MG/DL (ref 0–1.2)
BUN SERPL-MCNC: 7 MG/DL (ref 6–20)
BUN/CREAT SERPL: 9.5 (ref 7–25)
CALCIUM SPEC-SCNC: 8.4 MG/DL (ref 8.6–10.5)
CHLORIDE SERPL-SCNC: 113 MMOL/L (ref 98–107)
CO2 SERPL-SCNC: 19.2 MMOL/L (ref 22–29)
CREAT SERPL-MCNC: 0.74 MG/DL (ref 0.57–1)
CYTO UR: NORMAL
DEPRECATED RDW RBC AUTO: 50.5 FL (ref 37–54)
EGFRCR SERPLBLD CKD-EPI 2021: 93.3 ML/MIN/1.73
EOSINOPHIL # BLD AUTO: 0.12 10*3/MM3 (ref 0–0.4)
EOSINOPHIL NFR BLD AUTO: 2.9 % (ref 0.3–6.2)
ERYTHROCYTE [DISTWIDTH] IN BLOOD BY AUTOMATED COUNT: 14 % (ref 12.3–15.4)
GLOBULIN UR ELPH-MCNC: 2.3 GM/DL
GLUCOSE SERPL-MCNC: 96 MG/DL (ref 65–99)
HCT VFR BLD AUTO: 34.9 % (ref 34–46.6)
HGB BLD-MCNC: 11.8 G/DL (ref 12–15.9)
IMM GRANULOCYTES # BLD AUTO: 0.01 10*3/MM3 (ref 0–0.05)
IMM GRANULOCYTES NFR BLD AUTO: 0.2 % (ref 0–0.5)
LAB AP CASE REPORT: NORMAL
LYMPHOCYTES # BLD AUTO: 1.87 10*3/MM3 (ref 0.7–3.1)
LYMPHOCYTES NFR BLD AUTO: 45.1 % (ref 19.6–45.3)
MAGNESIUM SERPL-MCNC: 2.2 MG/DL (ref 1.6–2.6)
MCH RBC QN AUTO: 33.3 PG (ref 26.6–33)
MCHC RBC AUTO-ENTMCNC: 33.8 G/DL (ref 31.5–35.7)
MCV RBC AUTO: 98.6 FL (ref 79–97)
MONOCYTES # BLD AUTO: 0.42 10*3/MM3 (ref 0.1–0.9)
MONOCYTES NFR BLD AUTO: 10.1 % (ref 5–12)
NEUTROPHILS NFR BLD AUTO: 1.7 10*3/MM3 (ref 1.7–7)
NEUTROPHILS NFR BLD AUTO: 41 % (ref 42.7–76)
NRBC BLD AUTO-RTO: 0 /100 WBC (ref 0–0.2)
PATH REPORT.FINAL DX SPEC: NORMAL
PATH REPORT.GROSS SPEC: NORMAL
PHOSPHATE SERPL-MCNC: 3.4 MG/DL (ref 2.5–4.5)
PLATELET # BLD AUTO: 217 10*3/MM3 (ref 140–450)
PMV BLD AUTO: 9.8 FL (ref 6–12)
POTASSIUM SERPL-SCNC: 3.6 MMOL/L (ref 3.5–5.2)
PROT SERPL-MCNC: 5.8 G/DL (ref 6–8.5)
RBC # BLD AUTO: 3.54 10*6/MM3 (ref 3.77–5.28)
SODIUM SERPL-SCNC: 139 MMOL/L (ref 136–145)
WBC NRBC COR # BLD AUTO: 4.15 10*3/MM3 (ref 3.4–10.8)

## 2025-05-01 PROCEDURE — 84100 ASSAY OF PHOSPHORUS: CPT | Performed by: HOSPITALIST

## 2025-05-01 PROCEDURE — 25010000002 PROCHLORPERAZINE 10 MG/2ML SOLUTION: Performed by: INTERNAL MEDICINE

## 2025-05-01 PROCEDURE — 83735 ASSAY OF MAGNESIUM: CPT | Performed by: HOSPITALIST

## 2025-05-01 PROCEDURE — 25010000002 HEPARIN LOCK FLUSH PER 10 UNITS: Performed by: EMERGENCY MEDICINE

## 2025-05-01 PROCEDURE — 85025 COMPLETE CBC W/AUTO DIFF WBC: CPT | Performed by: HOSPITALIST

## 2025-05-01 PROCEDURE — 25010000002 HYDROMORPHONE PER 4 MG: Performed by: HOSPITALIST

## 2025-05-01 PROCEDURE — 80053 COMPREHEN METABOLIC PANEL: CPT | Performed by: HOSPITALIST

## 2025-05-01 PROCEDURE — G0378 HOSPITAL OBSERVATION PER HR: HCPCS

## 2025-05-01 RX ORDER — PANTOPRAZOLE SODIUM 40 MG/1
40 TABLET, DELAYED RELEASE ORAL
Qty: 180 TABLET | Refills: 0 | Status: SHIPPED | OUTPATIENT
Start: 2025-05-01

## 2025-05-01 RX ORDER — SUCRALFATE 1 G/1
1 TABLET ORAL
Qty: 360 TABLET | Refills: 0 | Status: SHIPPED | OUTPATIENT
Start: 2025-05-01

## 2025-05-01 RX ADMIN — TOPIRAMATE 200 MG: 100 TABLET, FILM COATED ORAL at 08:58

## 2025-05-01 RX ADMIN — Medication 10 ML: at 09:19

## 2025-05-01 RX ADMIN — Medication 500 UNITS: at 09:19

## 2025-05-01 RX ADMIN — OXYCODONE AND ACETAMINOPHEN 1 TABLET: 5; 325 TABLET ORAL at 07:03

## 2025-05-01 RX ADMIN — FLUOXETINE 60 MG: 20 CAPSULE ORAL at 08:58

## 2025-05-01 RX ADMIN — LEVETIRACETAM 1000 MG: 500 TABLET, FILM COATED ORAL at 08:58

## 2025-05-01 RX ADMIN — PANTOPRAZOLE SODIUM 40 MG: 40 TABLET, DELAYED RELEASE ORAL at 07:03

## 2025-05-01 RX ADMIN — SUCRALFATE 1 G: 1 TABLET ORAL at 07:03

## 2025-05-01 RX ADMIN — HYDROMORPHONE HYDROCHLORIDE 0.25 MG: 1 INJECTION, SOLUTION INTRAMUSCULAR; INTRAVENOUS; SUBCUTANEOUS at 01:02

## 2025-05-01 RX ADMIN — PROCHLORPERAZINE EDISYLATE 5 MG: 5 INJECTION INTRAMUSCULAR; INTRAVENOUS at 07:03

## 2025-05-01 RX ADMIN — PROCHLORPERAZINE EDISYLATE 5 MG: 5 INJECTION INTRAMUSCULAR; INTRAVENOUS at 00:09

## 2025-05-01 NOTE — PLAN OF CARE
Goal Outcome Evaluation:  Plan of Care Reviewed With: patient           Outcome Evaluation: VSS, up with standby asst, c/o pain and nausea, given didludid x1 and percocet x1, compazine x1, voiding freely. on bed alarm for falls, is unsteady when ambulating, at times drags cane behind her, can be uncooperative at times, forgetfull at times and will not let her purse out of her reach or let anyone handle it. pt continue to state she knows its her gallblader and when she leaves shes going for a second opinion, diet now reg. given crackers with broth, will continue to monitor.

## 2025-05-01 NOTE — CASE MANAGEMENT/SOCIAL WORK
Case Management Discharge Note      Final Note: Home via private vehicle    Provided Post Acute Provider List?: N/A  N/A Provider List Comment: Denies needs. Plan is home    Selected Continued Care - Discharged on 5/1/2025 Admission date: 4/28/2025 - Discharge disposition: Home or Self Care      Destination    No services have been selected for the patient.                Durable Medical Equipment    No services have been selected for the patient.                Dialysis/Infusion    No services have been selected for the patient.                Home Medical Care    No services have been selected for the patient.                Therapy    No services have been selected for the patient.                Community Resources    No services have been selected for the patient.                Community & DME    No services have been selected for the patient.                    Transportation Services  Private: Car    Final Discharge Disposition Code: 01 - home or self-care

## 2025-05-01 NOTE — DISCHARGE SUMMARY
Patient Name: Noelle Mehta  : 1965  MRN: [id number]    Date of Admission: 2025  Date of Discharge:  2025  Primary Care Physician: Provider, No Known      Chief Complaint:   Abdominal Pain      Discharge Diagnoses     Active Hospital Problems    Diagnosis  POA    **Duodenal ulcer [K26.9]  Yes    Depression [F32.A]  Yes    Seizure disorder [G40.909]  Yes    Syncope [R55]  No    Gastritis and duodenitis [K29.90]  Yes    Intractable nausea and vomiting [R11.2]  Yes    Pain of upper abdomen [R10.10]  Yes      Resolved Hospital Problems   No resolved problems to display.        Hospital Course     59yo woman admitted with intractable nausea/vomiting and RUQ pain and found to have duodenal ulcers, gastritis, and duodenitis. Please see below for details of admission by problem:      Nausea/vomiting/RUQ pain  Gastritis/Duodenitis  Duodenal ulcers: LFTs wnl. Lipase wnl. CT A/P, GB US, and HIDA scan are fine. UDS positive for opiates but had received Dilaudid in ER. BAL was negative. GI consulted. Pt uses NSAIDs frequently. EGD yesterday showed gastritis, duodenitis, and non-bleeding duodenal ulcers. GI recommends BID PPI, Carafate, and repeat EGD in 3 months. GI will f/u on H.pylori testing--currently still pending. Home today. F/u with PCP at next available appt.     Syncope:  Suspect due to Dilaudid. Neg orthostatics here. EKG fine. Trop neg. Neuro has seen given h/o seizure. MRI Brain unremarkable. EEG fine. A1c, TSH, B12, and folate all wnl. Neuro has signed off and feels episode was multifactorial: opioids and vasovagal episode.      Epilepsy: Continued AEDs, no seizure activity here.     Depression: Continued Prozac.     H/o Lung CA: Diagnosed in , s/p lobectomy and chemotx completed .        SCDs for DVT prophylaxis while here.  Full code confirmed.  Discussed with patient and RN.  Discharge home today.      Day of Discharge     Subjective:  Doing better today. Tolerating diet. Voiding well.  No V/D. No F/C/NS. No SOA or CP or palp.     Physical Exam:  Temp:  [97 °F (36.1 °C)-98.6 °F (37 °C)] 97.2 °F (36.2 °C)  Heart Rate:  [67-84] 68  Resp:  [13-18] 16  BP: ()/(58-90) 142/89  Body mass index is 35.12 kg/m².  Physical Exam  Vitals and nursing note reviewed.   Constitutional:       General: She is not in acute distress.     Appearance: She is ill-appearing (chronically). She is not toxic-appearing or diaphoretic.   Cardiovascular:      Rate and Rhythm: Normal rate and regular rhythm.      Pulses: Normal pulses.      Comments: Port left upper chest  Pulmonary:      Effort: Pulmonary effort is normal. No respiratory distress.      Breath sounds: Normal breath sounds. No wheezing or rales.   Abdominal:      General: Bowel sounds are normal. There is no distension.      Palpations: Abdomen is soft.      Tenderness: There is abdominal tenderness (mild, diffuse). There is no guarding or rebound.   Musculoskeletal:         General: No swelling.      Cervical back: Neck supple.   Skin:     General: Skin is warm and dry.      Capillary Refill: Capillary refill takes less than 2 seconds.      Coloration: Skin is not jaundiced.   Neurological:      General: No focal deficit present.      Mental Status: She is alert. Mental status is at baseline.   Psychiatric:         Mood and Affect: Mood normal.         Consultants     Consult Orders (all) (From admission, onward)       Start     Ordered    04/29/25 1404  Inpatient Neurology Consult General  Once        Specialty:  Neurology  Provider:  Sissy Henry MD    04/29/25 1404    04/28/25 1935  Inpatient Gastroenterology Consult  Once        Specialty:  Gastroenterology  Provider:  Mackenzie Grier MD    04/28/25 1934    04/28/25 1633  [facility] (on-call MD unless specified) Details  Once        Specialty:  Hospitalist  Provider:  (Not yet assigned)    04/28/25 1632                  Procedures     ESOPHAGOGASTRODUODENOSCOPY WITH BIOPSIES    Imaging Results  (All)       Procedure Component Value Units Date/Time    NM HIDA SCAN WITH PHARMACOLOGICAL INTERVENTION [[id number]] Collected: 04/30/25 0852     Updated: 04/30/25 0907    Narrative:      NM HIDA SCAN WITH PHARMACOLOGICAL INTERVENTION-     TECHNIQUE: 5.3 mCi of Technetium-99m Mebrofenin was administered  intravenously. Static anterior planar imaging of the abdomen was  obtained at 30 and 60 minutes. IV CCK was subsequently administered with  additional dynamic scintigraphic imaging thereafter.     INDICATION: Postprandial right upper quadrant pain and nausea     COMPARISON: Gallbladder ultrasound 4/28/2025         FINDINGS: Liver, biliary tree and small bowel seen at 30 minutes.  Gallbladder seen at 60 minutes. Gallbladder ejection fraction 82%             Impression:      Normal gallbladder ejection fraction.        This report was finalized on 4/30/2025 9:04 AM by Dr. Toney Ventura M.D on Workstation: [id number]       MRI Brain With & Without Contrast [[id number]] Collected: 04/30/25 0423     Updated: 04/30/25 0433    Narrative:      BRAIN MRI WITH AND WITHOUT CONTRAST     HISTORY: History of lung cancer, rule out brain metastasis; R11.2-Nausea  with vomiting, unspecified; R10.10-Upper abdominal pain, unspecified;  T50.902D-Poisoning by unspecified drugs, medicaments and biological  substances, intentional self-harm, subsequent encounter     COMPARISON: None.     FINDINGS:  Multiplanar images of the head were obtained without and with  gadolinium. No areas of restricted diffusion are seen to suggest acute  infarct. The ventricles are normal in size. There is no midline shift or  mass effect. There is some periventricular and deep white matter  microangiopathic change. The intracranial flow voids appear intact. No  abnormality is seen on susceptibility weighted imaging. Postcontrast  imaging does not demonstrate any abnormal enhancement. There is no  evidence of venous occlusion. Mucosal thickening is noted  within the  ethmoid and maxillary sinuses. There is trace fluid within the mastoid  air cells, right greater than left.       Impression:      1. No acute intracranial abnormality. No abnormal enhancement.          This report was finalized on 4/30/2025 4:30 AM by Dr. Idania Babin M.D on Workstation: [id number]       US Gallbladder [[id number]] Collected: 04/28/25 1544     Updated: 04/28/25 1620    Narrative:      Right upper quadrant abdominal sonogram     TECHNIQUE: Grayscale and color Doppler images of the right upper  quadrant of the abdomen were obtained.     HISTORY: Right upper quadrant pain     COMPARISON: CT abdomen pelvis 4/20/2025     FINDINGS:     The gallbladder is not distended. There is no evidence of  cholelithiasis.     The common bile duct measures 4 mm in diameter. There is no intrahepatic  biliary ductal dilation.     The liver has a normal sonographic appearance.     The right kidney measures 11 cm in length.  The right kidney  demonstrates normal echogenicity and cortical thickness. There is no  right-sided hydronephrosis. There are no right-sided cysts, masses or  renal calculi.     The visualized portion of the pancreas is unremarkable.     Visualized portions of the aorta and IVC appear normal.       Impression:      1.  Negative right upper quadrant sonogram     This report was finalized on 4/28/2025 4:17 PM by Dr. Jaquan Ignacio M.D  on Workstation: [id number]       CT Outside Films [[id number]] Resulted: 04/28/25 1539     Updated: 04/28/25 1539    Narrative:      This procedure was auto-finalized with no dictation required.    CT Abdomen Pelvis With Contrast [[id number]] Collected: 04/28/25 1424     Updated: 04/28/25 1534    Narrative:      CT ABDOMEN PELVIS W CONTRAST-     HISTORY: 60 years of age, Female.  abdominal pain     TECHNIQUE:  CT includes axial imaging from the lung bases to the  trochanters with intravenous contrast and without use of oral contrast.  Data reconstructed in  coronal and sagittal planes. Radiation dose  reduction techniques were utilized, including automated exposure control  and exposure modulation based on body size.     COMPARISON: None     FINDINGS: There is linear subsegmental lower lobe atelectasis.     Gallbladder is mildly distended though there is no pericystic  inflammation. The gallbladder is mildly distended though there is no  evidence for wall thickening or pericholecystic inflammation. Further  evaluation could be performed with right upper quadrant sonogram if  indicated. Spleen, adrenal glands, pancreas, kidneys exhibit normal CT  appearance.     No finding to suggest appendicitis. No bowel dilatation or evidence for  bowel obstruction. There are mildly air distended large and small bowel  loops. No transition point. No ascites.     Small periumbilical hernia contains fat. Mild atherosclerotic  calcifications are present involving the abdominal aorta and iliac  vasculature.     At T8-9 there is a central to right posterior disc protrusion with mild  to moderate narrowing of the central canal. There is also prominent  posterior endplate spurring calcification associated the posterior  longitudinal ligament at the T12-L1 and L1-2 levels. There has been  right posterior decompression at the L1-2 level.       Impression:      1. No evidence for acute intra-abdominal process. Bowel loops are mildly  air distended though there is no bowel dilatation to suggest obstruction  or evidence for appendicitis.  2. Mild atherosclerotic disease.     Radiation dose reduction techniques were utilized, including automated  exposure control and exposure modulation based on body size.        This report was finalized on 4/28/2025 3:31 PM by Arvin Haynes M.D  on Workstation: [id number]       XR Chest 1 View [[id number]] Collected: 04/28/25 1014     Updated: 04/28/25 1017    Narrative:      XR CHEST 1 VW-4/28/2025     HISTORY: Chest pain.     Heart size is within normal  "limits. Lungs appear free of acute  infiltrates. Mediport catheter and cervical fusion plate are seen.       Impression:      1. No acute process.        This report was finalized on 4/28/2025 10:14 AM by Dr. Jose Lassiter M.D on Workstation: [id number]                 Pertinent Labs     Results from last 7 days   Lab Units 05/01/25  0510 04/30/25  0506 04/29/25  0650 04/28/25  1032   WBC 10*3/mm3 4.15 4.11 6.63 8.27   HEMOGLOBIN g/dL 11.8* 12.0 12.9 14.3   PLATELETS 10*3/mm3 217 191 248 240     Results from last 7 days   Lab Units 05/01/25  0510 04/30/25  0506 04/29/25  0650 04/28/25  1032   SODIUM mmol/L 139 139 139 136   POTASSIUM mmol/L 3.6 3.7 4.2 3.8   CHLORIDE mmol/L 113* 110* 106 102   CO2 mmol/L 19.2* 20.7* 23.9 23.0   BUN mg/dL 7 8 10 17   CREATININE mg/dL 0.74 0.88 0.86 0.90   GLUCOSE mg/dL 96 92 97 98   EGFR mL/min/1.73 93.3 75.8 77.9 73.8     Results from last 7 days   Lab Units 05/01/25  0510 04/30/25  0506 04/28/25  1032   ALBUMIN g/dL 3.5 3.5 4.2   BILIRUBIN mg/dL 0.3  --  0.5   ALK PHOS U/L 64  --  82   AST (SGOT) U/L 18  --  23   ALT (SGPT) U/L 15  --  16     Results from last 7 days   Lab Units 05/01/25  0510 04/30/25  0506 04/29/25  0650 04/28/25  1032   CALCIUM mg/dL 8.4* 8.5* 8.6 9.6   ALBUMIN g/dL 3.5 3.5  --  4.2   MAGNESIUM mg/dL 2.2 2.2  --   --    PHOSPHORUS mg/dL 3.4 2.9  --   --      Results from last 7 days   Lab Units 04/29/25  0650   LIPASE U/L 18     Results from last 7 days   Lab Units 04/28/25  1138 04/28/25  1032   HSTROP T ng/L <6 <6           Invalid input(s): \"LDLCALC\"          Test Results Pending at Discharge     Pending Results       None              Discharge Details        Discharge Medications        New Medications        Instructions Start Date   pantoprazole 40 MG EC tablet  Commonly known as: PROTONIX   40 mg, Oral, 2 Times Daily Before Meals      sucralfate 1 g tablet  Commonly known as: CARAFATE   1 g, Oral, 4 Times Daily Before Meals & Nightly         "     Continue These Medications        Instructions Start Date   albuterol sulfate  (90 Base) MCG/ACT inhaler  Commonly known as: PROVENTIL HFA;VENTOLIN HFA;PROAIR HFA   2 puffs, Every 6 Hours PRN      levETIRAcetam 1000 MG tablet  Commonly known as: KEPPRA   1 tablet, 2 Times Daily      nicotine 21 MG/24HR patch  Commonly known as: NICODERM CQ   1 patch, Every 24 Hours      PROzac 20 MG capsule  Generic drug: FLUoxetine   60 mg, Daily      topiramate 200 MG tablet  Commonly known as: TOPAMAX   1 tablet, Every 12 Hours Scheduled             Stop These Medications      folic acid 1 MG tablet  Commonly known as: FOLVITE     omeprazole 40 MG capsule  Commonly known as: priLOSEC     PREMPRO PO              Allergies   Allergen Reactions    Codeine Anaphylaxis    Lactose Intolerance (Gi) Unknown - Low Severity    Naltrexone Unknown - Low Severity    Latex Swelling and Rash       Discharge Disposition:  Home or Self Care      Discharge Diet:  Diet Order   Procedures    Diet: Regular/House; Fluid Consistency: Thin (IDDSI 0)       Discharge Activity:   As tolerated    CODE STATUS:    Code Status and Medical Interventions: CPR (Attempt to Resuscitate); Full Support   Ordered at: 04/28/25 1743     Code Status (Patient has no pulse and is not breathing):    CPR (Attempt to Resuscitate)     Medical Interventions (Patient has pulse or is breathing):    Full Support       No future appointments.  Additional Instructions for the Follow-ups that You Need to Schedule       Discharge Follow-up with PCP   As directed       Currently Documented PCP:    Provider, No Known    PCP Phone Number:    [phone]     Follow Up Details: Rafiq HALL (PCP) at next available appt        Discharge Follow-up with Specified Provider: Dr. Kim (GI); 2 Months   As directed      To: Dr. Kim (GI)   Follow Up: 2 Months               Follow-up Information       Provider, No Known .    Why: Rafiq HALL (PCP) at next available appt  Contact  information:  [facility] [id number]  [phone]                             Additional Instructions for the Follow-ups that You Need to Schedule       Discharge Follow-up with PCP   As directed       Currently Documented PCP:    Provider, No Known    PCP Phone Number:    [phone]     Follow Up Details: Rafiq HALL (PCP) at next available appt        Discharge Follow-up with Specified Provider: Dr. Kim (); 2 Months   As directed      To: Dr. Kim ()   Follow Up: 2 Months            Time Spent on Discharge:  Greater than 30 minutes      Aly Dinh MD  [facility]  05/01/25  08:23 EDT

## 2025-05-01 NOTE — NURSING NOTE
"VSS, on room air, voiding without issue. Pt denied any needs for discharge teaching and states \"I've been taking these medications all my life\". This RN tried to educate patient on new medications but she declined. Pt refused meds to beds and insisted on getting medications from a [facility]. Education provided by both this RN and MD on benefits of using Meds to beds. Medications to be picked up at [facility] by patient. Pt friend is taking her home and she is eager to go. Port de-accessed and heparin locked prior to d/c.   "

## 2025-05-04 ENCOUNTER — NURSE TRIAGE (OUTPATIENT)
Dept: CALL CENTER | Facility: HOSPITAL | Age: 60
End: 2025-05-04
Payer: COMMERCIAL

## 2025-05-04 NOTE — TELEPHONE ENCOUNTER
"Was in the hospital discharged 05/01/2025    Muscle jerks left arm and leg when she lies down at night. She was having these jerks while hospitalized and they were not concerned.    Jerks/spasms only occur at might while lying down.  She will get out of bed and pace and jerks will resolve.  Suggested warm bath or heat to relax muscles when preparing to lie down  Advised to call PCP in am.  Go to the ED for any seizure type activity  Reason for Disposition   [1] Muscle jerks or tics without an obvious cause AND [2] brief (seconds, gone now) AND [3] otherwise feels normal (well)    Additional Information   Negative: Difficult to awaken or acting confused (e.g., disoriented, slurred speech)   Negative: Sounds like a life-threatening emergency to the triager   Negative: Abnormal twitch, blinking, tic, or spasm of eyelid(s)   Negative: Sounds like a seizure (generalized or focal)   Negative: Suspected alcohol withdrawal   Negative: Suspected substance use (drug use), addiction, or withdrawal   Negative: [1] Shivering or chills AND [2] fever   Negative: [1] Shivering or chills AND [2] cold exposure (R/O hypothermia)   Negative: Face, arm, or leg weakness   Negative: [1] Muscle rigidity or tightness AND [2] present now   Negative: [1] New-onset muscle jerks (twitches, spasms) AND [2] present now   Negative: [1] New-onset muscle jerks AND [2] episode lasts > 1 minute AND [3] resolved   Negative: Patient sounds very sick or weak to the triager   Negative: [1] Muscle rigidity or tightness AND [2] brief (now gone)   Negative: [1] New-onset muscle jerks AND [2] unexplained AND [3] 3 or more times/day   Negative: Shuddering (trembling) occurs without an obvious cause    Answer Assessment - Initial Assessment Questions  1. APPEARANCE of MOVEMENT: \"What did the jerking or twitching look like?\" (e.g., body area)      Muscle jerks of left arm and leg  2. ONSET: \"When did this start happening?\" (e.g., hours, days, weeks, months " "ago)       During hospital stay  3. DURATION: \"How long does the jerk, twitch, or spasm last?\"        Usually has to gt out of bed and pace Maybe 20 minutes  4. FREQUENCY:  \"How often does this happen?\"       Every night when she lies down  5. WHEN: \"When does this happen?\" (e.g., while awake, while falling asleep, while sleeping)      While trying to fall asleep  6. CAUSE: \"What do you think caused the *No Answer*?\"  Not sure.  Maybe her new, mediations Protonix and Carafate  7. OTHER SYMPTOMS: \"Are there any other symptoms?\" (e.g., fever, headache)    No fever no headache no other symptoms  8. PREGNANCY: \"Is there any chance you are pregnant?\" \"When was your last menstrual period?\"      na    Protocols used: Muscle Jerks - Tics - Shudders-ADULT-AH    "

## 2025-05-27 ENCOUNTER — PREP FOR SURGERY (OUTPATIENT)
Dept: SURGERY | Facility: SURGERY CENTER | Age: 60
End: 2025-05-27
Payer: COMMERCIAL

## 2025-05-27 DIAGNOSIS — K29.80 DUODENITIS: ICD-10-CM

## 2025-05-27 DIAGNOSIS — K26.9 MULTIPLE DUODENAL ULCERS: Primary | ICD-10-CM

## 2025-05-27 DIAGNOSIS — K29.70 GASTRITIS WITHOUT BLEEDING, UNSPECIFIED CHRONICITY, UNSPECIFIED GASTRITIS TYPE: ICD-10-CM

## 2025-05-27 RX ORDER — SODIUM CHLORIDE 0.9 % (FLUSH) 0.9 %
10 SYRINGE (ML) INJECTION AS NEEDED
OUTPATIENT
Start: 2025-05-27

## 2025-05-27 RX ORDER — SODIUM CHLORIDE 0.9 % (FLUSH) 0.9 %
3 SYRINGE (ML) INJECTION EVERY 12 HOURS SCHEDULED
OUTPATIENT
Start: 2025-05-27

## 2025-05-27 RX ORDER — SODIUM CHLORIDE, SODIUM LACTATE, POTASSIUM CHLORIDE, CALCIUM CHLORIDE 600; 310; 30; 20 MG/100ML; MG/100ML; MG/100ML; MG/100ML
30 INJECTION, SOLUTION INTRAVENOUS CONTINUOUS PRN
OUTPATIENT
Start: 2025-05-27 | End: 2025-05-27

## 2025-06-30 ENCOUNTER — APPOINTMENT (OUTPATIENT)
Dept: GENERAL RADIOLOGY | Facility: HOSPITAL | Age: 60
End: 2025-06-30
Payer: COMMERCIAL

## 2025-06-30 ENCOUNTER — HOSPITAL ENCOUNTER (EMERGENCY)
Facility: HOSPITAL | Age: 60
Discharge: HOME | End: 2025-06-30
Attending: EMERGENCY MEDICINE | Admitting: EMERGENCY MEDICINE
Payer: COMMERCIAL

## 2025-06-30 VITALS
HEART RATE: 76 BPM | OXYGEN SATURATION: 98 % | RESPIRATION RATE: 16 BRPM | BODY MASS INDEX: 35.12 KG/M2 | SYSTOLIC BLOOD PRESSURE: 146 MMHG | TEMPERATURE: 97.3 F | HEIGHT: 67 IN | DIASTOLIC BLOOD PRESSURE: 89 MMHG

## 2025-06-30 DIAGNOSIS — G89.29 ACUTE ON CHRONIC BACK PAIN: Primary | ICD-10-CM

## 2025-06-30 DIAGNOSIS — M17.12 OSTEOARTHRITIS OF LEFT KNEE, UNSPECIFIED OSTEOARTHRITIS TYPE: ICD-10-CM

## 2025-06-30 DIAGNOSIS — M54.9 ACUTE ON CHRONIC BACK PAIN: Primary | ICD-10-CM

## 2025-06-30 DIAGNOSIS — M51.360 DEGENERATION OF INTERVERTEBRAL DISC OF LUMBAR REGION WITH DISCOGENIC BACK PAIN: ICD-10-CM

## 2025-06-30 PROCEDURE — 99283 EMERGENCY DEPT VISIT LOW MDM: CPT

## 2025-06-30 PROCEDURE — 73562 X-RAY EXAM OF KNEE 3: CPT

## 2025-06-30 PROCEDURE — 72100 X-RAY EXAM L-S SPINE 2/3 VWS: CPT

## 2025-06-30 RX ORDER — CYCLOBENZAPRINE HCL 10 MG
10 TABLET ORAL ONCE
Status: COMPLETED | OUTPATIENT
Start: 2025-06-30 | End: 2025-06-30

## 2025-06-30 RX ORDER — CYCLOBENZAPRINE HCL 5 MG
5 TABLET ORAL 3 TIMES DAILY PRN
Qty: 30 TABLET | Refills: 0 | Status: SHIPPED | OUTPATIENT
Start: 2025-06-30

## 2025-06-30 RX ORDER — LIDOCAINE 4 G/G
1 PATCH TOPICAL ONCE
Status: DISCONTINUED | OUTPATIENT
Start: 2025-06-30 | End: 2025-06-30 | Stop reason: HOSPADM

## 2025-06-30 RX ADMIN — LIDOCAINE 1 PATCH: 4 PATCH TOPICAL at 20:47

## 2025-06-30 RX ADMIN — CYCLOBENZAPRINE 10 MG: 10 TABLET, FILM COATED ORAL at 20:21

## 2025-07-01 NOTE — DISCHARGE INSTRUCTIONS
Thank you for allowing us to provide care for you today.  Your workup today revealed evidence of acute on chronic back pain with evidence of degenerative disc disease of your lumbar spine and osteoarthritis of your left knee.  Please continue take your previously prescribed medications.  Apply lidocaine patches to your back.  Sent in a medication for Flexeril for muscle spasm management and sleep aid.  Please do not operate motor vehicles while on this medication.  Please follow-up with orthopedics and your PCP.  Thank you

## 2025-07-09 ENCOUNTER — TELEPHONE (OUTPATIENT)
Dept: ORTHOPEDIC SURGERY | Facility: CLINIC | Age: 60
End: 2025-07-09
Payer: COMMERCIAL

## 2025-07-09 NOTE — TELEPHONE ENCOUNTER
Osteoarthritis of left knee, unspecified osteoarthritis type -ED 06/30/2025-XRAY LEFT KNEE 06/30/2025- XRAY LUMBAR 06/30/2025-BEEN ON CALL

## (undated) DEVICE — ADAPT CLN BIOGUARD AIR/H2O DISP

## (undated) DEVICE — BLCK/BITE BLOX W/DENTL/RIM W/STRAP 54F

## (undated) DEVICE — MSK PROC CURAPLEX O2 2/ADAPT 7FT

## (undated) DEVICE — LN SMPL CO2 SHTRM SD STREAM W/M LUER

## (undated) DEVICE — KT ORCA ORCAPOD DISP STRL

## (undated) DEVICE — SENSR O2 OXIMAX FNGR A/ 18IN NONSTR

## (undated) DEVICE — FRCP BX RADJAW4 NDL 2.8 240CM LG OG BX40

## (undated) DEVICE — TUBING, SUCTION, 1/4" X 10', STRAIGHT: Brand: MEDLINE